# Patient Record
Sex: MALE | Race: WHITE | NOT HISPANIC OR LATINO | Employment: FULL TIME | URBAN - NONMETROPOLITAN AREA
[De-identification: names, ages, dates, MRNs, and addresses within clinical notes are randomized per-mention and may not be internally consistent; named-entity substitution may affect disease eponyms.]

---

## 2017-02-06 ENCOUNTER — OFFICE VISIT (OUTPATIENT)
Dept: CARDIOLOGY | Facility: CLINIC | Age: 47
End: 2017-02-06

## 2017-02-06 VITALS
BODY MASS INDEX: 42.66 KG/M2 | OXYGEN SATURATION: 97 % | HEART RATE: 63 BPM | DIASTOLIC BLOOD PRESSURE: 78 MMHG | HEIGHT: 72 IN | SYSTOLIC BLOOD PRESSURE: 122 MMHG | WEIGHT: 315 LBS

## 2017-02-06 DIAGNOSIS — I25.119 CORONARY ARTERY DISEASE INVOLVING NATIVE CORONARY ARTERY OF NATIVE HEART WITH ANGINA PECTORIS (HCC): Primary | ICD-10-CM

## 2017-02-06 DIAGNOSIS — R06.02 SHORTNESS OF BREATH: ICD-10-CM

## 2017-02-06 DIAGNOSIS — I20.9 ANGINA PECTORIS (HCC): ICD-10-CM

## 2017-02-06 DIAGNOSIS — E78.5 DYSLIPIDEMIA: ICD-10-CM

## 2017-02-06 DIAGNOSIS — I10 BENIGN HYPERTENSION: ICD-10-CM

## 2017-02-06 DIAGNOSIS — R07.9 CHEST PAIN, UNSPECIFIED TYPE: ICD-10-CM

## 2017-02-06 DIAGNOSIS — R42 DIZZINESS: ICD-10-CM

## 2017-02-06 DIAGNOSIS — R42 LIGHTHEADEDNESS: ICD-10-CM

## 2017-02-06 DIAGNOSIS — R00.2 PALPITATIONS: ICD-10-CM

## 2017-02-06 PROBLEM — K70.30 ALCOHOLIC CIRRHOSIS (HCC): Status: ACTIVE | Noted: 2017-02-06

## 2017-02-06 PROCEDURE — 99214 OFFICE O/P EST MOD 30 MIN: CPT | Performed by: INTERNAL MEDICINE

## 2017-02-06 RX ORDER — SERTRALINE HYDROCHLORIDE 100 MG/1
200 TABLET, FILM COATED ORAL DAILY
Refills: 2 | COMMUNITY
Start: 2016-12-27

## 2017-02-06 RX ORDER — SOY ISOFLAVONE 40 MG
500 TABLET ORAL 2 TIMES DAILY
Qty: 60 EACH | Refills: 5 | Status: SHIPPED | OUTPATIENT
Start: 2017-02-06 | End: 2017-10-05 | Stop reason: ALTCHOICE

## 2017-02-06 RX ORDER — FUROSEMIDE 20 MG/1
20 TABLET ORAL DAILY
Refills: 1 | COMMUNITY
Start: 2016-12-30 | End: 2018-01-12 | Stop reason: SDUPTHER

## 2017-02-06 RX ORDER — CLOPIDOGREL BISULFATE 75 MG/1
75 TABLET ORAL DAILY
Refills: 11 | COMMUNITY
Start: 2016-12-27 | End: 2018-01-12

## 2017-02-06 RX ORDER — NITROGLYCERIN 0.4 MG/1
0.4 TABLET SUBLINGUAL
COMMUNITY
End: 2017-02-28 | Stop reason: SDUPTHER

## 2017-02-06 RX ORDER — PANTOPRAZOLE SODIUM 40 MG/1
TABLET, DELAYED RELEASE ORAL DAILY PRN
Refills: 2 | COMMUNITY
Start: 2017-01-26 | End: 2017-09-07

## 2017-02-06 RX ORDER — ATORVASTATIN CALCIUM 40 MG/1
40 TABLET, FILM COATED ORAL DAILY
Qty: 30 TABLET | Refills: 11 | Status: SHIPPED | OUTPATIENT
Start: 2017-02-06 | End: 2017-09-07

## 2017-02-06 RX ORDER — LITHIUM CARBONATE 300 MG/1
CAPSULE ORAL 2 TIMES DAILY
Refills: 2 | COMMUNITY
Start: 2016-12-27

## 2017-02-06 RX ORDER — ASPIRIN 81 MG/1
81 TABLET ORAL DAILY
Refills: 2 | COMMUNITY
Start: 2017-01-26 | End: 2017-10-25 | Stop reason: ALTCHOICE

## 2017-02-06 RX ORDER — RANOLAZINE 500 MG/1
500 TABLET, EXTENDED RELEASE ORAL 2 TIMES DAILY
Qty: 60 TABLET | Refills: 5 | Status: SHIPPED | OUTPATIENT
Start: 2017-02-06 | End: 2017-02-07 | Stop reason: SDUPTHER

## 2017-02-07 DIAGNOSIS — I25.119 CORONARY ARTERY DISEASE INVOLVING NATIVE CORONARY ARTERY OF NATIVE HEART WITH ANGINA PECTORIS (HCC): ICD-10-CM

## 2017-02-07 DIAGNOSIS — I20.9 ANGINA PECTORIS (HCC): ICD-10-CM

## 2017-02-07 DIAGNOSIS — R07.9 CHEST PAIN, UNSPECIFIED TYPE: ICD-10-CM

## 2017-02-07 RX ORDER — RANOLAZINE 500 MG/1
500 TABLET, EXTENDED RELEASE ORAL 2 TIMES DAILY
Qty: 60 TABLET | Refills: 5 | Status: SHIPPED | OUTPATIENT
Start: 2017-02-07 | End: 2017-09-07

## 2017-02-23 ENCOUNTER — HOSPITAL ENCOUNTER (OUTPATIENT)
Dept: CARDIOLOGY | Facility: HOSPITAL | Age: 47
Discharge: HOME | End: 2017-02-23
Attending: INTERNAL MEDICINE

## 2017-02-23 ENCOUNTER — OUTSIDE FACILITY SERVICE (OUTPATIENT)
Dept: CARDIOLOGY | Facility: CLINIC | Age: 47
End: 2017-02-23

## 2017-02-23 LAB
MAXIMAL PREDICTED HEART RATE: 172 BPM
STRESS TARGET HR: 146 BPM

## 2017-02-23 PROCEDURE — A9500 TC99M SESTAMIBI: HCPCS | Performed by: INTERNAL MEDICINE

## 2017-02-23 PROCEDURE — 93306 TTE W/DOPPLER COMPLETE: CPT

## 2017-02-23 PROCEDURE — 78452 HT MUSCLE IMAGE SPECT MULT: CPT

## 2017-02-23 PROCEDURE — 78452 HT MUSCLE IMAGE SPECT MULT: CPT | Performed by: INTERNAL MEDICINE

## 2017-02-23 PROCEDURE — 25010000002 DOBUTAMINE PER 250 MG: Performed by: INTERNAL MEDICINE

## 2017-02-23 PROCEDURE — 0 TECHNETIUM SESTAMIBI: Performed by: INTERNAL MEDICINE

## 2017-02-23 PROCEDURE — 93018 CV STRESS TEST I&R ONLY: CPT | Performed by: INTERNAL MEDICINE

## 2017-02-23 PROCEDURE — 93306 TTE W/DOPPLER COMPLETE: CPT | Performed by: INTERNAL MEDICINE

## 2017-02-23 PROCEDURE — 93017 CV STRESS TEST TRACING ONLY: CPT

## 2017-02-23 RX ORDER — METOPROLOL TARTRATE 5 MG/5ML
2.5 INJECTION INTRAVENOUS ONCE
Status: COMPLETED | OUTPATIENT
Start: 2017-02-23 | End: 2017-02-23

## 2017-02-23 RX ORDER — DOBUTAMINE HYDROCHLORIDE 200 MG/100ML
10 INJECTION INTRAVENOUS CONTINUOUS
Status: DISCONTINUED | OUTPATIENT
Start: 2017-02-23 | End: 2017-02-24 | Stop reason: HOSPADM

## 2017-02-23 RX ADMIN — Medication 1 DOSE: at 13:00

## 2017-02-23 RX ADMIN — METOPROLOL TARTRATE 2.5 MG: 1 INJECTION, SOLUTION INTRAVENOUS at 14:26

## 2017-02-23 RX ADMIN — DOBUTAMINE HYDROCHLORIDE 10 MCG/KG/MIN: 200 INJECTION INTRAVENOUS at 14:26

## 2017-02-23 RX ADMIN — Medication 1 DOSE: at 14:26

## 2017-02-27 ENCOUNTER — TELEPHONE (OUTPATIENT)
Dept: CARDIOLOGY | Facility: CLINIC | Age: 47
End: 2017-02-27

## 2017-02-28 ENCOUNTER — OFFICE VISIT (OUTPATIENT)
Dept: CARDIOLOGY | Facility: CLINIC | Age: 47
End: 2017-02-28

## 2017-02-28 VITALS
DIASTOLIC BLOOD PRESSURE: 82 MMHG | WEIGHT: 315 LBS | BODY MASS INDEX: 42.66 KG/M2 | OXYGEN SATURATION: 98 % | SYSTOLIC BLOOD PRESSURE: 133 MMHG | HEIGHT: 72 IN | HEART RATE: 84 BPM

## 2017-02-28 DIAGNOSIS — R94.39 ABNORMAL STRESS TEST: ICD-10-CM

## 2017-02-28 DIAGNOSIS — R06.02 SHORTNESS OF BREATH: ICD-10-CM

## 2017-02-28 DIAGNOSIS — I25.119 CORONARY ARTERY DISEASE INVOLVING NATIVE CORONARY ARTERY OF NATIVE HEART WITH ANGINA PECTORIS (HCC): ICD-10-CM

## 2017-02-28 DIAGNOSIS — R07.2 PRECORDIAL PAIN: Primary | ICD-10-CM

## 2017-02-28 PROCEDURE — 99213 OFFICE O/P EST LOW 20 MIN: CPT | Performed by: PHYSICIAN ASSISTANT

## 2017-02-28 RX ORDER — INSULIN GLARGINE 100 [IU]/ML
50 INJECTION, SOLUTION SUBCUTANEOUS
Refills: 1 | COMMUNITY
Start: 2017-02-16 | End: 2019-08-20 | Stop reason: SDUPTHER

## 2017-02-28 RX ORDER — NITROGLYCERIN 0.4 MG/1
0.4 TABLET SUBLINGUAL
Qty: 25 TABLET | Refills: 2 | Status: SHIPPED | OUTPATIENT
Start: 2017-02-28 | End: 2017-06-17 | Stop reason: SDUPTHER

## 2017-02-28 NOTE — PROGRESS NOTES
Problem list     Subjective   Ricardo Crespo is a 46 y.o. male     Chief Complaint   Patient presents with   • Follow-up     testing f/u   Problem List:  1.  Coronary artery disease  1.1.  Stenting of the LAD, 11/2015, per Dr. Moss.  1.2.  Repeat catheterization 2-3 weeks after stenting as above, indicating patent stent with 40% PDA stenosis.  Medical management was recommended.  1.3.  Repeat catheterization, 01/2016, indicating nonobstructive disease with patent stent.  Medical management was recommended.  2.  Preserved systolic function  3.  Hypertension  4.  Dyslipidemia  5.  Diabetes mellitus  6.  Sleep apnea  7.  Reported history of DVT.    HPI  The patient presents back today to review test results.  He was scheduled for stress test because of chest and jaw pain.  The patient had that test approximately a week ago.  During dobutamine infusion, the patient developed significant jaw pain with nondiagnostic EKG changes.  Scintigraphically however, the patient had inferobasilar, inferobasal/septal, and basilar posterolateral ischemia.  Post stress EF was preserved, however the patient had an elevated TID suggesting possible ischemic LV dilation post stress.  It was felt to be of increased risk given symptoms expressed during study and the ischemic burden as above.  We wanted to bring him back today to discuss results.  The patient continues to have chest and especially jaw pain.  His jaw pain is most concerning for him as this is what he had specifically prior to stenting.  He reports this as his anginal equivalent symptom.  Symptoms appear to be worsening.  He reports that symptoms occur with exertion.  His symptoms were so severe recently that he had had a syncopal episode related to the same.  He reports he had no rhythm disturbance symptoms at that time.  The patient has no failure symptoms otherwise.  He has no further complaints otherwise.    Current Outpatient Prescriptions   Medication Sig Dispense  Refill   • ASPIR-LOW 81 MG EC tablet Daily.  2   • atorvastatin (LIPITOR) 40 MG tablet Take 1 tablet by mouth Daily. 30 tablet 11   • clopidogrel (PLAVIX) 75 MG tablet Daily.  11   • furosemide (LASIX) 20 MG tablet Daily.  1   • L-Arginine 500 MG capsule Take 500 mg by mouth 2 (Two) Times a Day. 60 each 5   • lithium carbonate 300 MG capsule 5 x daily  2   • pantoprazole (PROTONIX) 40 MG EC tablet Daily As Needed.  2   • ranolazine (RANEXA) 500 MG 12 hr tablet Take 1 tablet by mouth 2 (Two) Times a Day. 60 tablet 5   • sertraline (ZOLOFT) 100 MG tablet 200 mg Daily.  2   • Insulin Glargine (BASAGLAR KWIKPEN) 100 UNIT/ML injection pen 32 units daily  1   • nitroglycerin (NITROSTAT) 0.4 MG SL tablet Place 1 tablet under the tongue Every 5 (Five) Minutes As Needed for chest pain. Take no more than 3 doses in 15 minutes. 25 tablet 2     No current facility-administered medications for this visit.        Beta adrenergic blockers; Calcium channel blockers; and Imdur [isosorbide dinitrate]    Past Medical History   Diagnosis Date   • Anxiety    • Benign hypertension    • Coronary artery disease    • Depression    • DVT (deep venous thrombosis)      Questionable history of deep venous thrombosis.   • Dyslipidemia    • Hyperlipidemia    • JIM on CPAP      Obstructive sleep apnea, compliant with CPAP.    • Type 2 diabetes mellitus        Social History     Social History   • Marital status:      Spouse name: N/A   • Number of children: N/A   • Years of education: N/A     Occupational History   • Not on file.     Social History Main Topics   • Smoking status: Former Smoker   • Smokeless tobacco: Not on file   • Alcohol use No   • Drug use: No   • Sexual activity: Not on file     Other Topics Concern   • Not on file     Social History Narrative       Family History   Problem Relation Age of Onset   • Heart disease Father    • Nephrolithiasis Father    • Atrial fibrillation Mother    • No Known Problems Sister   "      Review of Systems   Constitutional: Positive for fatigue.   HENT: Negative.    Eyes: Positive for visual disturbance (glasses).   Respiratory: Positive for shortness of breath.    Cardiovascular: Positive for chest pain, palpitations and leg swelling (rt.).   Gastrointestinal: Negative.    Endocrine: Negative.    Genitourinary: Negative.    Musculoskeletal: Negative.    Skin: Negative.    Allergic/Immunologic: Negative.    Neurological: Positive for dizziness and light-headedness.   Hematological: Bruises/bleeds easily.   Psychiatric/Behavioral: Positive for sleep disturbance.       Objective   Visit Vitals   • /82 (BP Location: Left arm, Patient Position: Sitting)   • Pulse 84   • Ht 72\" (182.9 cm)   • Wt (!) 322 lb (146 kg)   • SpO2 98%   • BMI 43.67 kg/m2     Lab Results (most recent)     None        Physical Exam   Constitutional: He is oriented to person, place, and time. He appears well-developed and well-nourished. No distress.   HENT:   Head: Normocephalic and atraumatic.   Eyes: Conjunctivae and EOM are normal. Pupils are equal, round, and reactive to light.   Neck: Normal range of motion. Neck supple. No JVD present. No tracheal deviation present.   Cardiovascular: Normal rate, regular rhythm, normal heart sounds and intact distal pulses.    Pulmonary/Chest: Effort normal and breath sounds normal.   Abdominal: Soft. Bowel sounds are normal. He exhibits no distension and no mass. There is no tenderness. There is no rebound and no guarding.   Musculoskeletal: Normal range of motion. He exhibits no edema, tenderness or deformity.   Neurological: He is alert and oriented to person, place, and time.   Skin: Skin is warm and dry. No rash noted. No erythema. No pallor.   Psychiatric: He has a normal mood and affect. His behavior is normal. Judgment and thought content normal.   Nursing note and vitals reviewed.        Procedure   Procedures       Assessment/Plan      Diagnosis Plan   1. Precordial " pain  Cardiac catheterization    CBC & Differential    Comprehensive Metabolic Panel    CBC & Differential    Comprehensive Metabolic Panel   2. Coronary artery disease involving native coronary artery of native heart with angina pectoris  Cardiac catheterization    CBC & Differential    Comprehensive Metabolic Panel    CBC & Differential    Comprehensive Metabolic Panel   3. Shortness of breath  Cardiac catheterization    CBC & Differential    Comprehensive Metabolic Panel    CBC & Differential    Comprehensive Metabolic Panel   4. Abnormal stress test         With the patient's abnormal stress test in severity of symptoms, I do feel that he needs catheterization.  I'll continue medications.  He is on appropriate medications for now.  Further recommendations to be made pending results of catheterization.  He will need labs prior to catheter as we do not have recent values for him.  We'll see him back after catheter and recommend further at that time.

## 2017-03-13 ENCOUNTER — TELEPHONE (OUTPATIENT)
Dept: CARDIOLOGY | Facility: CLINIC | Age: 47
End: 2017-03-13

## 2017-03-13 NOTE — TELEPHONE ENCOUNTER
----- Message from Kelle Chambers sent at 3/13/2017 12:10 PM EDT -----  Contact: PATIENT  CALLED AND ASKED FOR KELLIE. HAS SOME QUESTIONS ABOUT HIS HEART CATH TOMORROW. [phone]    Reviewed instructions and medications with patient.

## 2017-03-14 ENCOUNTER — OUTSIDE FACILITY SERVICE (OUTPATIENT)
Dept: CARDIOLOGY | Facility: CLINIC | Age: 47
End: 2017-03-14

## 2017-03-14 PROCEDURE — 93458 L HRT ARTERY/VENTRICLE ANGIO: CPT | Performed by: INTERNAL MEDICINE

## 2017-03-14 PROCEDURE — 92978 ENDOLUMINL IVUS OCT C 1ST: CPT | Performed by: INTERNAL MEDICINE

## 2017-03-14 PROCEDURE — 92928 PRQ TCAT PLMT NTRAC ST 1 LES: CPT | Performed by: INTERNAL MEDICINE

## 2017-04-03 ENCOUNTER — OFFICE VISIT (OUTPATIENT)
Dept: CARDIOLOGY | Facility: CLINIC | Age: 47
End: 2017-04-03

## 2017-04-03 VITALS
SYSTOLIC BLOOD PRESSURE: 119 MMHG | WEIGHT: 315 LBS | OXYGEN SATURATION: 98 % | HEIGHT: 72 IN | HEART RATE: 75 BPM | BODY MASS INDEX: 42.66 KG/M2 | DIASTOLIC BLOOD PRESSURE: 80 MMHG

## 2017-04-03 DIAGNOSIS — R06.02 SHORTNESS OF BREATH: ICD-10-CM

## 2017-04-03 DIAGNOSIS — R07.2 PRECORDIAL PAIN: ICD-10-CM

## 2017-04-03 DIAGNOSIS — I25.10 CORONARY ARTERY DISEASE INVOLVING NATIVE CORONARY ARTERY OF NATIVE HEART WITHOUT ANGINA PECTORIS: Primary | ICD-10-CM

## 2017-04-03 PROCEDURE — 99213 OFFICE O/P EST LOW 20 MIN: CPT | Performed by: PHYSICIAN ASSISTANT

## 2017-04-03 NOTE — PROGRESS NOTES
Problem list     Subjective   Ricardo Crespo is a 46 y.o. male     Chief Complaint   Patient presents with   • Follow-up     presents as a follow up   Problem List:  1. Coronary artery disease  1.1. Stenting of the LAD, 11/2015, per Dr. Moss.  1.2. Repeat catheterization 2-3 weeks after stenting as above, indicating patent stent with 40% PDA stenosis. Medical management was recommended.  1.3. Repeat catheterization, 01/2016, indicating nonobstructive disease with patent stent. Medical management was recommended.  1.4.  Stenting of the PDA, 03/2017, in the setting of low-level symptoms and abnormal stress test.  Previous stent to the LAD was patent.  The patient had nonobstructive disease otherwise.  Medical management was recommended.  2. Preserved systolic function  3. Hypertension  4. Dyslipidemia  5. Diabetes mellitus  6. Sleep apnea  7. Reported history of DVT.       HPI  The patient presents back today for follow-up post catheterization.  He was scheduled for catheterization given chest and jaw pain and ischemia noted by stress test.  He did have stenting of the PDA for 75-80% stenosis.  The patient had a patent stent to the LAD.  He had nonobstructive disease otherwise.  Medical management was recommended.  The patient feels much improved post stenting.  He noted almost immediately an increase in exercise capacity and tolerance.  He reports that his dyspnea has markedly improved.  He has had no further jaw pain.  Only rarely does he have chest pain, which has been a chronic issue for him.  The patient has no PND or orthopnea.  He relates to no rhythm disturbance issues.  Cast site has remained stable post diagnostic catheter with intervention.  He has no further complaints otherwise and feels that he is doing very well.    Current Outpatient Prescriptions   Medication Sig Dispense Refill   • ASPIR-LOW 81 MG EC tablet Daily.  2   • atorvastatin (LIPITOR) 40 MG tablet Take 1 tablet by mouth Daily. 30 tablet  11   • clopidogrel (PLAVIX) 75 MG tablet Daily.  11   • furosemide (LASIX) 20 MG tablet Daily.  1   • Insulin Glargine (BASAGLAR KWIKPEN) 100 UNIT/ML injection pen 32 units daily  1   • L-Arginine 500 MG capsule Take 500 mg by mouth 2 (Two) Times a Day. 60 each 5   • lithium carbonate 300 MG capsule 5 x daily  2   • nitroglycerin (NITROSTAT) 0.4 MG SL tablet Place 1 tablet under the tongue Every 5 (Five) Minutes As Needed for chest pain. Take no more than 3 doses in 15 minutes. 25 tablet 2   • pantoprazole (PROTONIX) 40 MG EC tablet Daily As Needed.  2   • ranolazine (RANEXA) 500 MG 12 hr tablet Take 1 tablet by mouth 2 (Two) Times a Day. 60 tablet 5   • sertraline (ZOLOFT) 100 MG tablet 200 mg Daily.  2     No current facility-administered medications for this visit.        Beta adrenergic blockers; Calcium channel blockers; and Imdur [isosorbide dinitrate]    Past Medical History:   Diagnosis Date   • Anxiety    • Benign hypertension    • Coronary artery disease    • Depression    • DVT (deep venous thrombosis)     Questionable history of deep venous thrombosis.   • Dyslipidemia    • Hyperlipidemia    • JIM on CPAP     Obstructive sleep apnea, compliant with CPAP.    • Type 2 diabetes mellitus        Social History     Social History   • Marital status:      Spouse name: N/A   • Number of children: N/A   • Years of education: N/A     Occupational History   • Not on file.     Social History Main Topics   • Smoking status: Former Smoker   • Smokeless tobacco: Not on file   • Alcohol use No   • Drug use: No   • Sexual activity: Not on file     Other Topics Concern   • Not on file     Social History Narrative       Family History   Problem Relation Age of Onset   • Heart disease Father    • Nephrolithiasis Father    • Atrial fibrillation Mother    • No Known Problems Sister        Review of Systems   Constitutional: Positive for fatigue.   HENT: Negative for rhinorrhea.    Eyes: Positive for visual disturbance.  "  Respiratory: Positive for shortness of breath.    Cardiovascular: Positive for chest pain and leg swelling. Negative for palpitations.   Gastrointestinal: Negative.    Endocrine: Negative.    Genitourinary: Negative.    Musculoskeletal: Positive for neck pain.   Skin: Negative.    Allergic/Immunologic: Negative.    Neurological: Positive for headaches.   Hematological: Bruises/bleeds easily.   Psychiatric/Behavioral: The patient is nervous/anxious.        Objective   /80 (BP Location: Left arm, Patient Position: Sitting)  Pulse 75  Ht 72\" (182.9 cm)  Wt (!) 322 lb 6.4 oz (146 kg)  SpO2 98%  BMI 43.73 kg/m2  Lab Results (most recent)     None        Physical Exam   Constitutional: He is oriented to person, place, and time. He appears well-developed and well-nourished. No distress.   HENT:   Head: Normocephalic and atraumatic.   Eyes: Conjunctivae and EOM are normal. Pupils are equal, round, and reactive to light.   Neck: Normal range of motion. Neck supple. No JVD present. No tracheal deviation present.   Cardiovascular: Normal rate, regular rhythm, normal heart sounds and intact distal pulses.    Pulmonary/Chest: Effort normal and breath sounds normal.   Abdominal: Soft. Bowel sounds are normal. He exhibits no distension and no mass. There is no tenderness. There is no rebound and no guarding.   Musculoskeletal: Normal range of motion. He exhibits no edema, tenderness or deformity.   Neurological: He is alert and oriented to person, place, and time.   Skin: Skin is warm and dry. No rash noted. No erythema. No pallor.   Psychiatric: He has a normal mood and affect. His behavior is normal. Judgment and thought content normal.   Nursing note and vitals reviewed.        Procedure   Procedures       Assessment/Plan      Diagnosis Plan   1. Coronary artery disease involving native coronary artery of native heart without angina pectoris     2. Shortness of breath     3. Precordial pain       Symptoms, as " above, have improved post stenting of the PDA.  The patient reports that he feels very well at this time.  He is on appropriate medications for which I will make no changes.  The patient is doing well post-stenting.  For complications, he will call to us.  Otherwise, nothing further and we will see him back in 6 months.         Answers for HPI/ROS submitted by the patient on 3/31/2017   Shortness of breath  Chronicity: new  Onset: more than 1 month ago  Frequency: daily  Progression since onset: gradually worsening  Episode duration: 101 minutes  leg pain: Yes  orthopnea: Yes  Aggravating factors: occupational exposure, lying flat

## 2017-04-26 ENCOUNTER — TELEPHONE (OUTPATIENT)
Dept: CARDIOLOGY | Facility: CLINIC | Age: 47
End: 2017-04-26

## 2017-04-26 NOTE — TELEPHONE ENCOUNTER
Called and spoke with wife and she states he has been having severe chest pain and has had to take up to 10 SL NTG., but he refuses to go to the ER. I told her that if he takes up to 3 he has to go to the ER. She stated she has told him this. She requested an appt. Appt. Scheduled jacki. With DEMETRIUS Terrell at 3:00 pm. Wife aware and i told her that in the meantime he has to go to the ER. Verbalized she understood. PH,LPN

## 2017-04-26 NOTE — TELEPHONE ENCOUNTER
----- Message from Kelle Chambers sent at 4/26/2017 10:04 AM EDT -----  Contact: PT  PATIENT IS WANTING A NURSE TO CALL HIM BACK. HE WOULDN'T GIVE ME ANY DETAILS AS TO WHY HE WANTED TO SPEAK TO SOMEONE. PLEASE ADVISE. KG

## 2017-06-17 DIAGNOSIS — R07.89 OTHER CHEST PAIN: Primary | ICD-10-CM

## 2017-06-19 RX ORDER — NITROGLYCERIN 0.4 MG/1
TABLET SUBLINGUAL
Qty: 25 TABLET | Refills: 2 | Status: SHIPPED | OUTPATIENT
Start: 2017-06-19 | End: 2017-12-21 | Stop reason: SDUPTHER

## 2017-09-07 ENCOUNTER — OFFICE VISIT (OUTPATIENT)
Dept: CARDIOLOGY | Facility: CLINIC | Age: 47
End: 2017-09-07

## 2017-09-07 VITALS
SYSTOLIC BLOOD PRESSURE: 143 MMHG | WEIGHT: 315 LBS | OXYGEN SATURATION: 98 % | BODY MASS INDEX: 42.66 KG/M2 | HEART RATE: 74 BPM | HEIGHT: 72 IN | DIASTOLIC BLOOD PRESSURE: 91 MMHG

## 2017-09-07 DIAGNOSIS — R07.2 PRECORDIAL PAIN: Primary | ICD-10-CM

## 2017-09-07 DIAGNOSIS — R06.02 SHORTNESS OF BREATH: ICD-10-CM

## 2017-09-07 DIAGNOSIS — I10 ESSENTIAL HYPERTENSION: ICD-10-CM

## 2017-09-07 DIAGNOSIS — R53.83 FATIGUE, UNSPECIFIED TYPE: ICD-10-CM

## 2017-09-07 PROCEDURE — 93000 ELECTROCARDIOGRAM COMPLETE: CPT | Performed by: PHYSICIAN ASSISTANT

## 2017-09-07 PROCEDURE — 99214 OFFICE O/P EST MOD 30 MIN: CPT | Performed by: PHYSICIAN ASSISTANT

## 2017-09-07 RX ORDER — ROSUVASTATIN CALCIUM 10 MG/1
10 TABLET, COATED ORAL DAILY
Qty: 30 TABLET | Refills: 11 | Status: SHIPPED | OUTPATIENT
Start: 2017-09-07 | End: 2017-10-25 | Stop reason: ALTCHOICE

## 2017-09-07 NOTE — PROGRESS NOTES
Problem list     Subjective   Ricardo Crespo is a 47 y.o. male     Chief Complaint   Patient presents with   • Chest Pain   • Jaw Pain   • Fatigue   • Shortness of Breath     Problem List:  1. Coronary artery disease  1.1. Stenting of the LAD, 11/2015, per Dr. Moss.  1.2. Repeat catheterization 2-3 weeks after stenting as above, indicating patent stent with 40% PDA stenosis. Medical management was recommended.  1.3. Repeat catheterization, 01/2016, indicating nonobstructive disease with patent stent. Medical management was recommended.  1.4.  Stenting of the PDA, 03/2017, in the setting of low-level symptoms and abnormal stress test.  Previous stent to the LAD was patent.  The patient had nonobstructive disease otherwise.  Medical management was recommended.  2. Preserved systolic function  3. Hypertension  4. Dyslipidemia  5. Diabetes mellitus  6. Sleep apnea  7. Reported history of DVT.    HPI  The patient presents back today at his request.  He has started having episodes of chest tightness with referral to the left neck, left jaw, and left upper extremity.  These are the exact symptoms he had with her previous episodes of acute coronary syndrome.  He is concerned enough at the symptoms, particularly with the progression of his symptoms, that he is requested evaluation.  Symptoms occur with exertion.  Symptoms are relieved with nitroglycerin historically.  He reports no rhythm disturbance or failure issues/symptoms at this time.  Blood pressures have been reasonably well-controlled.  He denies further complaints or symptoms otherwise.    Current Outpatient Prescriptions   Medication Sig Dispense Refill   • ASPIR-LOW 81 MG EC tablet Daily.  2   • clopidogrel (PLAVIX) 75 MG tablet Daily.  11   • furosemide (LASIX) 20 MG tablet Daily.  1   • Insulin Glargine (BASAGLAR KWIKPEN) 100 UNIT/ML injection pen Inject 140 Units under the skin Daily. 32 units daily  1   • L-Arginine 500 MG capsule Take 500 mg by mouth 2  (Two) Times a Day. 60 each 5   • lithium carbonate 300 MG capsule 5 x daily  2   • nitroglycerin (NITROSTAT) 0.4 MG SL tablet PLACE 1 TABLET UNDER THE TONGUE EVERY 5 MINUTES UP TO 3 TABLETS IN 15 MINUTES FOR CHEST PAIN. IF NO RELIEF GO TO THE EMERGENCY ROOM OR CALL 25 tablet 2   • sertraline (ZOLOFT) 100 MG tablet 200 mg Daily.  2   • rosuvastatin (CRESTOR) 10 MG tablet Take 1 tablet by mouth Daily. 30 tablet 11     No current facility-administered medications for this visit.        Beta adrenergic blockers; Calcium channel blockers; and Imdur [isosorbide dinitrate]    Past Medical History:   Diagnosis Date   • Anxiety    • Benign hypertension    • Coronary artery disease    • Depression    • DVT (deep venous thrombosis)     Questionable history of deep venous thrombosis.   • Dyslipidemia    • Hyperlipidemia    • JIM on CPAP     Obstructive sleep apnea, compliant with CPAP.    • Type 2 diabetes mellitus        Social History     Social History   • Marital status:      Spouse name: N/A   • Number of children: N/A   • Years of education: N/A     Occupational History   • Not on file.     Social History Main Topics   • Smoking status: Former Smoker   • Smokeless tobacco: Not on file   • Alcohol use No   • Drug use: No   • Sexual activity: Not on file     Other Topics Concern   • Not on file     Social History Narrative       Family History   Problem Relation Age of Onset   • Heart disease Father    • Nephrolithiasis Father    • Atrial fibrillation Mother    • No Known Problems Sister        Review of Systems   Constitutional: Positive for fatigue.   HENT: Positive for tinnitus (left ear).    Eyes: Positive for visual disturbance (glasses).   Respiratory: Positive for shortness of breath.    Cardiovascular: Positive for chest pain, palpitations and leg swelling (rt.).   Gastrointestinal: Negative.    Endocrine: Negative.    Genitourinary: Negative.    Musculoskeletal: Negative.    Skin: Negative.   "  Allergic/Immunologic: Negative.    Neurological: Positive for dizziness and light-headedness.   Hematological: Bruises/bleeds easily (bruise).   Psychiatric/Behavioral: Negative.        Objective   /91 (BP Location: Left arm, Patient Position: Sitting)  Pulse 74  Ht 72\" (182.9 cm)  Wt (!) 319 lb 9.6 oz (145 kg)  SpO2 98%  BMI 43.35 kg/m2  Lab Results (most recent)     None        Physical Exam   Constitutional: He is oriented to person, place, and time. He appears well-developed and well-nourished. No distress.   HENT:   Head: Normocephalic and atraumatic.   Eyes: Conjunctivae and EOM are normal. Pupils are equal, round, and reactive to light.   Neck: Normal range of motion. Neck supple. No JVD present. No tracheal deviation present.   Cardiovascular: Normal rate, regular rhythm, normal heart sounds and intact distal pulses.    Pulmonary/Chest: Effort normal and breath sounds normal.   Abdominal: Soft. Bowel sounds are normal. He exhibits no distension and no mass. There is no tenderness. There is no rebound and no guarding.   Musculoskeletal: Normal range of motion. He exhibits no edema, tenderness or deformity.   Neurological: He is alert and oriented to person, place, and time.   Skin: Skin is warm and dry. No rash noted. No erythema. No pallor.   Psychiatric: He has a normal mood and affect. His behavior is normal. Judgment and thought content normal.   Nursing note and vitals reviewed.        Procedure     ECG 12 Lead  Date/Time: 9/7/2017 3:21 PM  Performed by: RENETTA ESTEVEZ  Authorized by: RENETTA ESTEVEZ   Comments: Sinus rhythm, normal axis, incomplete right bundle branch block pattern, no acute changes noted.               Assessment/Plan      Diagnosis Plan   1. Precordial pain  ECG 12 Lead    Cardiac catheterization    Comprehensive Metabolic Panel    CBC & Differential    Comprehensive Metabolic Panel    CBC & Differential   2. Shortness of breath  ECG 12 Lead    Cardiac catheterization "    Comprehensive Metabolic Panel    CBC & Differential    Comprehensive Metabolic Panel    CBC & Differential   3. Fatigue, unspecified type  ECG 12 Lead    Cardiac catheterization    Comprehensive Metabolic Panel    CBC & Differential    Comprehensive Metabolic Panel    CBC & Differential   4. Essential hypertension       The patient describes, and has what he feels is, unstable angina.  We discussed various options of evaluation of his current symptoms.  I discussed consideration for stress test.  His concern is that his current symptoms are exactly what he had with unstable angina/acute coronary syndrome in the past, resulting in percutaneous intervention.  His stress test results prior to that were unremarkable at that time.  He is concerned and convinced enough with symptoms that after discussing workup with him, we have decided to proceed on with catheterization.  I will continue dual antiplatelet therapy and statin therapy.  He has nitroglycerin use as needed.  We would like to repeat labs prior to catheterization.  Should he have symptoms prior to catheter, he will proceed on to the emergency room.  Further pending results of catheterization.

## 2017-09-12 LAB
ALBUMIN SERPL-MCNC: 4.7 G/DL (ref 3.5–5.5)
ALBUMIN/GLOB SERPL: 1.5 {RATIO} (ref 1.2–2.2)
ALP SERPL-CCNC: 85 IU/L (ref 39–117)
ALT SERPL-CCNC: 36 IU/L (ref 0–44)
AMBIG ABBREV CMP14 DEFAULT: NORMAL
AST SERPL-CCNC: 37 IU/L (ref 0–40)
BILIRUB SERPL-MCNC: 0.5 MG/DL (ref 0–1.2)
BUN SERPL-MCNC: 11 MG/DL (ref 6–24)
BUN/CREAT SERPL: 14 (ref 9–20)
CALCIUM SERPL-MCNC: 9 MG/DL (ref 8.7–10.2)
CHLORIDE SERPL-SCNC: 103 MMOL/L (ref 96–106)
CO2 SERPL-SCNC: 22 MMOL/L (ref 18–29)
CREAT SERPL-MCNC: 0.81 MG/DL (ref 0.76–1.27)
GLOBULIN SER CALC-MCNC: 3.1 G/DL (ref 1.5–4.5)
GLUCOSE SERPL-MCNC: 125 MG/DL (ref 65–99)
POTASSIUM SERPL-SCNC: 4.2 MMOL/L (ref 3.5–5.2)
PROT SERPL-MCNC: 7.8 G/DL (ref 6–8.5)
SODIUM SERPL-SCNC: 143 MMOL/L (ref 134–144)

## 2017-09-19 ENCOUNTER — TELEPHONE (OUTPATIENT)
Dept: CARDIOLOGY | Facility: CLINIC | Age: 47
End: 2017-09-19

## 2017-09-19 NOTE — TELEPHONE ENCOUNTER
patient insurance approved Crestor 10 mg. Valid dates 09-18-17 - 09-18-18. patient pharmacy has been notified.

## 2017-09-26 ENCOUNTER — TELEPHONE (OUTPATIENT)
Dept: CARDIOLOGY | Facility: CLINIC | Age: 47
End: 2017-09-26

## 2017-09-26 NOTE — TELEPHONE ENCOUNTER
----- Message from Jeison Bull sent at 9/25/2017  3:21 PM EDT -----  Contact: [facility]  JENNIFER  I CALLED JENNIFER AT THE  AT [facility] AND SHE INFORMED ME THAT BECCA HERRON DID NOT CHECK IN FOR HIS HEART CATH SCHEDULED FOR TODAY  (9/25/17)     Patient presented to ER on 9/18/17 and underwent Cardiac Cath with Stent Placement per Dr. Bee. Will follow up in office.

## 2017-10-05 ENCOUNTER — OFFICE VISIT (OUTPATIENT)
Dept: CARDIOLOGY | Facility: CLINIC | Age: 47
End: 2017-10-05

## 2017-10-05 VITALS
OXYGEN SATURATION: 96 % | WEIGHT: 315 LBS | HEART RATE: 80 BPM | HEIGHT: 72 IN | SYSTOLIC BLOOD PRESSURE: 139 MMHG | BODY MASS INDEX: 42.66 KG/M2 | DIASTOLIC BLOOD PRESSURE: 88 MMHG

## 2017-10-05 DIAGNOSIS — I25.10 CORONARY ARTERY DISEASE INVOLVING NATIVE CORONARY ARTERY OF NATIVE HEART WITHOUT ANGINA PECTORIS: ICD-10-CM

## 2017-10-05 DIAGNOSIS — R00.2 PALPITATIONS: ICD-10-CM

## 2017-10-05 DIAGNOSIS — R07.2 PRECORDIAL PAIN: Primary | ICD-10-CM

## 2017-10-05 PROCEDURE — 99213 OFFICE O/P EST LOW 20 MIN: CPT | Performed by: PHYSICIAN ASSISTANT

## 2017-10-05 PROCEDURE — 93000 ELECTROCARDIOGRAM COMPLETE: CPT | Performed by: PHYSICIAN ASSISTANT

## 2017-10-05 RX ORDER — APIXABAN 5 MG/1
5 TABLET, FILM COATED ORAL DAILY
COMMUNITY
Start: 2017-09-20 | End: 2018-01-02 | Stop reason: SDUPTHER

## 2017-10-05 NOTE — PROGRESS NOTES
Problem list     Subjective   Ricardo Crespo is a 47 y.o. male     Chief Complaint   Patient presents with   • Chest Pain     presents for a follow up from cath, having some chest pain and shortness o fbreath   • Shortness of Breath   Problem List:  1. Coronary artery disease  1.1. Stenting of the LAD, 11/2015, per Dr. Moss.  1.2. Repeat catheterization 2-3 weeks after stenting as above, indicating patent stent with 40% PDA stenosis. Medical management was recommended.  1.3. Repeat catheterization, 01/2016, indicating nonobstructive disease with patent stent. Medical management was recommended.  1.4.  Stenting of the PDA, 03/2017, in the setting of low-level symptoms and abnormal stress test.  Previous stent to the LAD was patent.  The patient had nonobstructive disease otherwise.  Medical management was recommended.  1.5.  Stenting of the mid RCA and PTCA only of the distal LAD, 09/17.  2. Preserved systolic function  3. Hypertension  4. Dyslipidemia  5. Diabetes mellitus  6. Sleep apnea  7. Reported history of DVT.    HPI  The patient presents back for follow-up post catheterization.  I had seen him in the office and scheduled for outpatient catheterization.  He went to the ER just a couple of days later however with crescendo angina.  He was taken for catheterization had stenting of the RCA and PTCA only of the distal LAD as above.  The patient does feel improved.  His episodes of chest pain is been minimal.  He does note that he was out working just a couple of days ago.  He reports that he exert at a very high levels.  He had onset of chest discomfort at that time.  He feels this was an episode of atrial fibrillation however.  He reports that he can feel his heart out of rhythm, which he reports has been A. fib in the past.  He then noted mild chest discomfort.  The patient has less dyspnea at this time.  He has no failure symptoms.  He has no further complaints otherwise.    Current Outpatient  Prescriptions   Medication Sig Dispense Refill   • ASPIR-LOW 81 MG EC tablet Take 81 mg by mouth Daily.  2   • clopidogrel (PLAVIX) 75 MG tablet Take 75 mg by mouth Daily.  11   • ELIQUIS 5 MG tablet tablet Take 5 mg by mouth Daily.     • furosemide (LASIX) 20 MG tablet Take 20 mg by mouth Daily.  1   • Insulin Glargine (BASAGLAR KWIKPEN) 100 UNIT/ML injection pen Inject 140 Units under the skin Daily. 32 units daily  1   • lithium carbonate 300 MG capsule 5 x daily  2   • nitroglycerin (NITROSTAT) 0.4 MG SL tablet PLACE 1 TABLET UNDER THE TONGUE EVERY 5 MINUTES UP TO 3 TABLETS IN 15 MINUTES FOR CHEST PAIN. IF NO RELIEF GO TO THE EMERGENCY ROOM OR CALL 25 tablet 2   • rosuvastatin (CRESTOR) 10 MG tablet Take 1 tablet by mouth Daily. 30 tablet 11   • sertraline (ZOLOFT) 100 MG tablet 200 mg Daily.  2     No current facility-administered medications for this visit.        Beta adrenergic blockers; Calcium channel blockers; and Imdur [isosorbide dinitrate]    Past Medical History:   Diagnosis Date   • Anxiety    • Benign hypertension    • Coronary artery disease    • Depression    • DVT (deep venous thrombosis)     Questionable history of deep venous thrombosis.   • Dyslipidemia    • Hyperlipidemia    • JIM on CPAP     Obstructive sleep apnea, compliant with CPAP.    • Type 2 diabetes mellitus        Social History     Social History   • Marital status:      Spouse name: N/A   • Number of children: N/A   • Years of education: N/A     Occupational History   • Not on file.     Social History Main Topics   • Smoking status: Former Smoker   • Smokeless tobacco: Never Used   • Alcohol use No   • Drug use: No   • Sexual activity: Not on file     Other Topics Concern   • Not on file     Social History Narrative       Family History   Problem Relation Age of Onset   • Heart disease Father    • Nephrolithiasis Father    • Atrial fibrillation Mother    • No Known Problems Sister        Review of Systems   Constitutional:  "Negative.  Negative for fever.   HENT: Negative for ear pain and sore throat.    Eyes: Negative.    Respiratory: Positive for shortness of breath and wheezing.    Cardiovascular: Positive for chest pain (patient states he was feeling bad last night, wife said he even turned gray) and leg swelling.   Gastrointestinal: Negative.  Negative for abdominal pain and vomiting.   Endocrine: Negative.    Genitourinary: Negative.    Musculoskeletal: Negative.  Negative for neck pain.   Skin: Positive for rash.   Allergic/Immunologic: Negative.    Neurological: Positive for headaches.   Hematological: Negative.    Psychiatric/Behavioral: Negative.        Objective   /88 (BP Location: Left arm, Patient Position: Sitting)  Pulse 80  Ht 72\" (182.9 cm)  Wt (!) 327 lb (148 kg)  SpO2 96%  BMI 44.35 kg/m2  Lab Results (most recent)     None        Physical Exam   Constitutional: He is oriented to person, place, and time. He appears well-developed and well-nourished. No distress.   HENT:   Head: Normocephalic and atraumatic.   Eyes: Conjunctivae and EOM are normal. Pupils are equal, round, and reactive to light.   Neck: Normal range of motion. Neck supple. No JVD present. No tracheal deviation present.   Cardiovascular: Normal rate, regular rhythm, normal heart sounds and intact distal pulses.    Pulmonary/Chest: Effort normal and breath sounds normal.   Abdominal: Soft. Bowel sounds are normal. He exhibits no distension and no mass. There is no tenderness. There is no rebound and no guarding.   Musculoskeletal: Normal range of motion. He exhibits no edema, tenderness or deformity.   Neurological: He is alert and oriented to person, place, and time.   Skin: Skin is warm and dry. No rash noted. No erythema. No pallor.   Psychiatric: He has a normal mood and affect. His behavior is normal. Judgment and thought content normal.   Nursing note and vitals reviewed.        Procedure     ECG 12 Lead  Date/Time: 10/5/2017 2:38 " "PM  Performed by: RENETTA ESTEVEZ  Authorized by: RENETTA ESTEVEZ   Comments: Chest pain  Sinus rhythm, left axis deviation, incomplete right bundle branch block pattern, PAC noted, no acute changes noted.               Assessment/Plan      Diagnosis Plan   1. Precordial pain  ECG 12 Lead    Cardiac Event Monitor   2. Palpitations  Cardiac Event Monitor   3. Coronary artery disease involving native coronary artery of native heart without angina pectoris  Cardiac Event Monitor     The patient feels improved post stenting.  He did have a recent episode of chest pain which she feels was secondary to \"A. fib\" all as outlined above.  He continues to have that at this time.  I will schedule for an event monitor so we can see what type or rhythm issues he is having when he experiences the same.  He is completely revascularized at this time.  I feel no further form of ischemia assessment is warranted at this time.  I have asked that he discontinue aspirin but continue Plavix and anticoagulation.  As soon as we have event monitor results available.         Answers for HPI/ROS submitted by the patient on 10/3/2017   Shortness of breath  Chronicity: chronic  Onset: more than 1 year ago  Frequency: constantly  Progression since onset: gradually worsening  Episode duration: 22 hours  claudication: No  coryza: No  hemoptysis: No  leg pain: Yes  orthopnea: Yes  PND: Yes  sputum production: No  swollen glands: No  syncope: Yes  Aggravating factors: exercise, any activity    "

## 2017-10-23 ENCOUNTER — OUTSIDE FACILITY SERVICE (OUTPATIENT)
Dept: CARDIOLOGY | Facility: CLINIC | Age: 47
End: 2017-10-23

## 2017-10-23 PROCEDURE — 93272 ECG/REVIEW INTERPRET ONLY: CPT | Performed by: INTERNAL MEDICINE

## 2017-10-25 ENCOUNTER — OFFICE VISIT (OUTPATIENT)
Dept: CARDIOLOGY | Facility: CLINIC | Age: 47
End: 2017-10-25

## 2017-10-25 VITALS
HEIGHT: 72 IN | DIASTOLIC BLOOD PRESSURE: 86 MMHG | HEART RATE: 80 BPM | WEIGHT: 315 LBS | BODY MASS INDEX: 42.66 KG/M2 | SYSTOLIC BLOOD PRESSURE: 138 MMHG | OXYGEN SATURATION: 98 %

## 2017-10-25 DIAGNOSIS — R00.2 PALPITATIONS: ICD-10-CM

## 2017-10-25 DIAGNOSIS — R06.02 SHORTNESS OF BREATH: Primary | ICD-10-CM

## 2017-10-25 DIAGNOSIS — R07.9 CHEST PAIN, UNSPECIFIED TYPE: ICD-10-CM

## 2017-10-25 PROCEDURE — 99213 OFFICE O/P EST LOW 20 MIN: CPT | Performed by: PHYSICIAN ASSISTANT

## 2017-10-25 NOTE — PROGRESS NOTES
"Problem list     Subjective   Ricardo Crespo is a 47 y.o. male     Chief Complaint   Patient presents with   • Chest Pain     presents for follow up   • Shortness of Breath   • Atrial Fibrillation       HPI    Problem List:  1. Coronary artery disease  1.1. Stenting of the LAD, 11/2015, per Dr. Moss.  1.2. Repeat catheterization 2-3 weeks after stenting as above, indicating patent stent with 40% PDA stenosis. Medical management was recommended.  1.3. Repeat catheterization, 01/2016, indicating nonobstructive disease with patent stent. Medical management was recommended.  1.4.  Stenting of the PDA, 03/2017, in the setting of low-level symptoms and abnormal stress test.  Previous stent to the LAD was patent.  The patient had nonobstructive disease otherwise.  Medical management was recommended.  1.5.  Stenting of the mid RCA and PTCA only of the distal LAD, 09/17.  2. Preserved systolic function  3. Hypertension  4. Dyslipidemia  5. Diabetes mellitus  6. Sleep apnea  7. Reported history of DVT.     Patient is a 47-year-old male that presents back to our office for follow-up.  Patient was all last office visit complaining of palpitations and chest pain.  Recently had interventions in the LAD and RCA and it done well but experiences palpitations.  Patient describes his chest pain is primarily with palpitations.  He will fill his heart \"get out of rhythm\".  He describes to me that he has history of atrial fibrillation although from reviewing prior cardiology notes a prior cardiologist notes, no history can be seen.  Patient does describe that he has this diagnosis.  Event monitoring demonstrates PACs but does not demonstrate any evidence of A. fib.  End of service report not available at this time.    He has mild dyspnea when exerting but nothing progressive does not complain of PND orthopnea.  Otherwise is doing well      Outpatient Encounter Prescriptions as of 10/25/2017   Medication Sig Dispense Refill   • " clopidogrel (PLAVIX) 75 MG tablet Take 75 mg by mouth Daily.  11   • ELIQUIS 5 MG tablet tablet Take 5 mg by mouth Daily.     • furosemide (LASIX) 20 MG tablet Take 20 mg by mouth Daily.  1   • Insulin Glargine (BASAGLAR KWIKPEN) 100 UNIT/ML injection pen Inject 140 Units under the skin Daily. 32 units daily  1   • lithium carbonate 300 MG capsule 5 x daily  2   • nitroglycerin (NITROSTAT) 0.4 MG SL tablet PLACE 1 TABLET UNDER THE TONGUE EVERY 5 MINUTES UP TO 3 TABLETS IN 15 MINUTES FOR CHEST PAIN. IF NO RELIEF GO TO THE EMERGENCY ROOM OR CALL 25 tablet 2   • sertraline (ZOLOFT) 100 MG tablet 200 mg Daily.  2   • Sotalol HCl AF 80 MG tablet Take 1 tablet by mouth 2 (Two) Times a Day. 60 tablet 11   • [DISCONTINUED] ASPIR-LOW 81 MG EC tablet Take 81 mg by mouth Daily.  2   • [DISCONTINUED] dronedarone (MULTAQ) 400 MG tablet Take 1 tablet by mouth 2 (Two) Times a Day With Meals. 60 tablet 6   • [DISCONTINUED] rosuvastatin (CRESTOR) 10 MG tablet Take 1 tablet by mouth Daily. 30 tablet 11     No facility-administered encounter medications on file as of 10/25/2017.        Beta adrenergic blockers; Calcium channel blockers; and Imdur [isosorbide dinitrate]    Past Medical History:   Diagnosis Date   • Anxiety    • Benign hypertension    • Coronary artery disease    • Depression    • DVT (deep venous thrombosis)     Questionable history of deep venous thrombosis.   • Dyslipidemia    • Hyperlipidemia    • JIM on CPAP     Obstructive sleep apnea, compliant with CPAP.    • Type 2 diabetes mellitus        Social History     Social History   • Marital status:      Spouse name: N/A   • Number of children: N/A   • Years of education: N/A     Occupational History   • Not on file.     Social History Main Topics   • Smoking status: Former Smoker   • Smokeless tobacco: Never Used   • Alcohol use No   • Drug use: No   • Sexual activity: Not on file     Other Topics Concern   • Not on file     Social History Narrative  "      Family History   Problem Relation Age of Onset   • Heart disease Father    • Nephrolithiasis Father    • Atrial fibrillation Mother    • No Known Problems Sister        Review of Systems   Constitutional: Positive for fatigue.   Eyes: Negative.    Respiratory: Positive for shortness of breath and wheezing.    Cardiovascular: Positive for chest pain, palpitations and leg swelling.   Gastrointestinal: Negative.    Endocrine: Negative.    Genitourinary: Negative.    Musculoskeletal: Positive for arthralgias.   Skin: Negative.    Allergic/Immunologic: Negative.    Neurological: Positive for headaches.   Hematological: Bruises/bleeds easily.   Psychiatric/Behavioral: Negative.        Objective     /86 (BP Location: Left arm, Patient Position: Sitting)  Pulse 80  Ht 72\" (182.9 cm)  Wt (!) 324 lb (147 kg)  SpO2 98%  BMI 43.94 kg/m2    Lab Results (most recent)     None          Physical Exam   Constitutional: He is oriented to person, place, and time. He appears well-developed and well-nourished. No distress.   HENT:   Head: Normocephalic and atraumatic.   Eyes: EOM are normal. Pupils are equal, round, and reactive to light.   Neck: No JVD present.   Cardiovascular: Normal rate, regular rhythm and normal heart sounds.  Exam reveals no gallop and no friction rub.    No murmur heard.  Pulmonary/Chest: Effort normal and breath sounds normal. No respiratory distress. He has no wheezes. He has no rales. He exhibits no tenderness.   Abdominal: Soft.   Musculoskeletal: Normal range of motion. He exhibits no edema.   Neurological: He is alert and oriented to person, place, and time. No cranial nerve deficit.   Skin: Skin is warm and dry. No rash noted. No erythema. No pallor.   Psychiatric: He has a normal mood and affect. His behavior is normal.   Nursing note and vitals reviewed.      Procedure   Procedures       Assessment/Plan     Problems Addressed this Visit        Cardiovascular and Mediastinum    " Palpitations       Respiratory    Shortness of breath - Primary       Nervous and Auditory    Chest pain            Recommendation  1.  Patient complains of palpitations with event monitoring demonstrating PACs.  He cannot tolerate beta blockers and calcium channel blockers.  On further questioning, he states that the reason he has because of hypotension that he has experienced in the past.  We will we would like to try antiarrhythmic medicine since he has felt some of the other medications.  We cannot tolerate flecainide because of prior history of MI.  I would try low-dose sotalol therapy.  We'll start this on Saturday and report primary on Monday.  EKG will be faxed to our office.  We will see back in 3-4 weeks to evaluate and follow up further.  Next line 2.  Otherwise patient doing well post intervention.  Chest pain does not appear ischemic in nature.  Follow-up primary as scheduled       Answers for HPI/ROS submitted by the patient on 10/24/2017   Shortness of breath  Chronicity: recurrent  Onset: more than 1 month ago  Frequency: daily  Progression since onset: gradually worsening  Episode duration: 5 minutes  Aggravating factors: emotional upset, occupational exposure, exercise, any activity

## 2017-10-26 ENCOUNTER — TELEPHONE (OUTPATIENT)
Dept: CARDIOLOGY | Facility: CLINIC | Age: 47
End: 2017-10-26

## 2017-10-26 NOTE — TELEPHONE ENCOUNTER
----- Message from Ricardo Crespo sent at 10/25/2017  5:39 PM EDT -----  Regarding: Non-Urgent Medical Question  Contact: [phone]  I was in office today I seen dr. Nuñez regarding a heart monitor and chest pain and shortness of breath.  I am allergic to beta blockers as noted on my chart prescribed me sotalol irregular heart rythm the pharmacist said it is a beta blocker.  Dr. Nuñez said it was not. When a doctor looks at a computer more than a patient I have issues.  I cant go up a flight of stairs without losing my breath and pressure/pain in my chest something is wrong.  I don't feel I am receiving appropriate treatment.  Respectfully  tian    Discussed with Grzegorz Daugherty PA-C. Per Grzegorz patient to come in tomorrow for Nurse Visit for Symptom Check and he will sit down and discuss these issues with him.

## 2017-12-07 ENCOUNTER — OFFICE VISIT (OUTPATIENT)
Dept: CARDIOLOGY | Facility: CLINIC | Age: 47
End: 2017-12-07

## 2017-12-07 VITALS
SYSTOLIC BLOOD PRESSURE: 123 MMHG | OXYGEN SATURATION: 98 % | HEART RATE: 100 BPM | DIASTOLIC BLOOD PRESSURE: 86 MMHG | WEIGHT: 315 LBS | BODY MASS INDEX: 42.66 KG/M2 | HEIGHT: 72 IN

## 2017-12-07 DIAGNOSIS — I10 BENIGN HYPERTENSION: ICD-10-CM

## 2017-12-07 DIAGNOSIS — R00.2 PALPITATIONS: ICD-10-CM

## 2017-12-07 DIAGNOSIS — I25.10 CORONARY ARTERY DISEASE INVOLVING NATIVE CORONARY ARTERY OF NATIVE HEART WITHOUT ANGINA PECTORIS: Primary | ICD-10-CM

## 2017-12-07 PROCEDURE — 99213 OFFICE O/P EST LOW 20 MIN: CPT | Performed by: PHYSICIAN ASSISTANT

## 2017-12-07 NOTE — PROGRESS NOTES
Problem list     Subjective   Ricardo Crespo is a 47 y.o. male     Chief Complaint   Patient presents with   • Follow-up     patient appears in office today for hospital follow up    • Shortness of Breath     patient states he is having incresed episodes of SOA    • Chest Pain     patient states he is having midsternal CP ; radiating into LUE and jaw; occasionally into shoulder blades   Problem List:  1. Coronary artery disease  1.1. Stenting of the LAD, 11/2015, per Dr. Moss.  1.2. Repeat catheterization 2-3 weeks after stenting as above, indicating patent stent with 40% PDA stenosis. Medical management was recommended.  1.3. Repeat catheterization, 01/2016, indicating nonobstructive disease with patent stent. Medical management was recommended.  1.4.  Stenting of the PDA, 03/2017, in the setting of low-level symptoms and abnormal stress test.  Previous stent to the LAD was patent.  The patient had nonobstructive disease otherwise.  Medical management was recommended.  1.5.  Stenting of the mid RCA and PTCA only of the distal LAD, 09/17.  1.6.  Repeat LHC, 11/17, in the setting of acute chest pain.  The patient had 50% LAD stenosis, patent stents, and non-obstructive CAD otherwise.  2. Preserved systolic function  3. Hypertension  4. Dyslipidemia  5. Diabetes mellitus  6. Sleep apnea  7. Reported history of DVT.    HPI  The patient presents in today for follow-up.  He did have catheterization late November after presenting to the hospital with chest pain.  He had findings as above.  There was also concern at one time about palpitations.  An event monitor was obtained which indicated sinus rhythm with PACs.  The patient tells me that his palpitations persist but are minimal for the most part at this time.  His chest pain is minimal as well.  His dyspnea is stable.  The patient has no dizziness or syncope.  He is tolerating current medications without complication.  He did have recent labs including lipids.  He  tells me that his lipid parameters were very poorly treated.  The patient cannot tolerate statin therapy in the past.  Mother concern for him has been his diagnosis of cirrhosis.  I have not pursued institution of statin in that setting.  At this time, the patient tells me that he is for the most part stable from cardiac standpoint has no specific issues otherwise.    Current Outpatient Prescriptions   Medication Sig Dispense Refill   • clopidogrel (PLAVIX) 75 MG tablet Take 75 mg by mouth Daily.  11   • ELIQUIS 5 MG tablet tablet Take 5 mg by mouth Daily.     • furosemide (LASIX) 20 MG tablet Take 20 mg by mouth Daily.  1   • Insulin Glargine (BASAGLAR KWIKPEN) 100 UNIT/ML injection pen Inject 140 Units under the skin Daily. 32 units daily  1   • lithium carbonate 300 MG capsule 5 x daily  2   • nitroglycerin (NITROSTAT) 0.4 MG SL tablet PLACE 1 TABLET UNDER THE TONGUE EVERY 5 MINUTES UP TO 3 TABLETS IN 15 MINUTES FOR CHEST PAIN. IF NO RELIEF GO TO THE EMERGENCY ROOM OR CALL 25 tablet 2   • sertraline (ZOLOFT) 100 MG tablet 200 mg Daily.  2   • Sotalol HCl AF 80 MG tablet Take 1 tablet by mouth 2 (Two) Times a Day. 60 tablet 11     No current facility-administered medications for this visit.        Beta adrenergic blockers; Calcium channel blockers; and Imdur [isosorbide dinitrate]    Past Medical History:   Diagnosis Date   • Anxiety    • Benign hypertension    • Coronary artery disease    • Depression    • DVT (deep venous thrombosis)     Questionable history of deep venous thrombosis.   • Dyslipidemia    • Hyperlipidemia    • JIM on CPAP     Obstructive sleep apnea, compliant with CPAP.    • Type 2 diabetes mellitus        Social History     Social History   • Marital status:      Spouse name: N/A   • Number of children: N/A   • Years of education: N/A     Occupational History   • Not on file.     Social History Main Topics   • Smoking status: Former Smoker   • Smokeless tobacco: Never Used   • Alcohol  use No   • Drug use: No   • Sexual activity: Defer     Other Topics Concern   • Not on file     Social History Narrative       Family History   Problem Relation Age of Onset   • Heart disease Father    • Nephrolithiasis Father    • Atrial fibrillation Mother    • No Known Problems Sister        Review of Systems   Constitutional: Negative.  Negative for fatigue and fever.   HENT: Negative for congestion, ear pain, hearing loss, rhinorrhea and sore throat.    Eyes: Positive for visual disturbance (wears readomg glasses ).   Respiratory: Positive for apnea (JIM with CPAP use), shortness of breath (increased epsidoes of SOA) and wheezing (late night wheezing ). Negative for cough and chest tightness.    Cardiovascular: Positive for chest pain (midsternal; radiating into LUE and jaw, occasional into shouldre blades) and leg swelling (RLE swelling/edema).   Gastrointestinal: Negative.  Negative for abdominal distention, abdominal pain, diarrhea, nausea and vomiting.   Endocrine: Negative.  Negative for cold intolerance, heat intolerance, polyphagia and polyuria.   Genitourinary: Negative.  Negative for difficulty urinating, frequency and urgency.   Musculoskeletal: Negative.  Negative for arthralgias, back pain, myalgias, neck pain and neck stiffness.   Skin: Negative.  Negative for rash and wound.   Allergic/Immunologic: Negative.  Negative for environmental allergies and food allergies.   Neurological: Positive for headaches (frequent H/A). Negative for dizziness, weakness and light-headedness.   Hematological: Bruises/bleeds easily (bruises and bleeds easily).   Psychiatric/Behavioral: Positive for agitation (easily agitated) and confusion (easily cinfused). Negative for sleep disturbance (denies waking up smothering/SOA ). The patient is nervous/anxious (easily nervous/anxious).        Objective   Vitals:    12/07/17 1408   BP: 123/86   BP Location: Left arm   Patient Position: Sitting   Pulse: 100   SpO2: 98%  "  Weight: (!) 147 kg (323 lb)   Height: 182.9 cm (72\")      /86 (BP Location: Left arm, Patient Position: Sitting)  Pulse 100  Ht 182.9 cm (72\")  Wt (!) 147 kg (323 lb)  SpO2 98%  BMI 43.81 kg/m2   Lab Results (most recent)     None        Physical Exam   Constitutional: He is oriented to person, place, and time. He appears well-developed and well-nourished. No distress.   HENT:   Head: Normocephalic and atraumatic.   Eyes: Conjunctivae and EOM are normal. Pupils are equal, round, and reactive to light.   Neck: Normal range of motion. Neck supple. No JVD present. No tracheal deviation present.   Cardiovascular: Normal rate, regular rhythm, normal heart sounds and intact distal pulses.    Pulmonary/Chest: Effort normal and breath sounds normal.   Abdominal: Soft. Bowel sounds are normal. He exhibits no distension and no mass. There is no tenderness. There is no rebound and no guarding.   Musculoskeletal: Normal range of motion. He exhibits no edema, tenderness or deformity.   Neurological: He is alert and oriented to person, place, and time.   Skin: Skin is warm and dry. No rash noted. No erythema. No pallor.   Psychiatric: He has a normal mood and affect. His behavior is normal. Judgment and thought content normal.   Nursing note and vitals reviewed.        Procedure   Procedures       Assessment/Plan      Diagnosis Plan   1. Coronary artery disease involving native coronary artery of native heart without angina pectoris     2. Benign hypertension     3. Palpitations       The patient is doing well for the most part at this time.  I will continue medications.  I do feel he needs consideration for injectable cholesterol medications.  He has felt at least to statin therapies.  I will not rechallenge statin therapy at this time given his history of diagnosis of cirrhosis.  We will obtain his lipid parameters/panel from his primary care provider performed approximately one week ago.  We can then recommend him " further.  I would like to see him back in 2-3 months just to reevaluate course.  He will call for any issues prior to follow-up.                 Electronically signed by:      Answers for HPI/ROS submitted by the patient on 12/4/2017   Shortness of breath  Chronicity: recurrent  Onset: more than 1 month ago  Frequency: daily  Progression since onset: gradually worsening  Episode duration: 5 minutes  coryza: No  hemoptysis: No  leg pain: No  orthopnea: Yes  PND: Yes  sputum production: No  swollen glands: No  syncope: No  Aggravating factors: any activity

## 2017-12-21 DIAGNOSIS — R07.89 OTHER CHEST PAIN: ICD-10-CM

## 2017-12-21 RX ORDER — NITROGLYCERIN 0.4 MG/1
TABLET SUBLINGUAL
Qty: 25 TABLET | Refills: 2 | Status: ON HOLD | OUTPATIENT
Start: 2017-12-21 | End: 2018-01-07

## 2018-01-02 DIAGNOSIS — I25.119 CORONARY ARTERY DISEASE INVOLVING NATIVE CORONARY ARTERY OF NATIVE HEART WITH ANGINA PECTORIS (HCC): Primary | ICD-10-CM

## 2018-01-02 RX ORDER — APIXABAN 5 MG/1
5 TABLET, FILM COATED ORAL EVERY 12 HOURS SCHEDULED
Qty: 60 TABLET | Refills: 6 | Status: SHIPPED | OUTPATIENT
Start: 2018-01-02 | End: 2018-01-12 | Stop reason: SDUPTHER

## 2018-01-07 ENCOUNTER — APPOINTMENT (OUTPATIENT)
Dept: GENERAL RADIOLOGY | Facility: HOSPITAL | Age: 48
End: 2018-01-07

## 2018-01-07 ENCOUNTER — HOSPITAL ENCOUNTER (OUTPATIENT)
Facility: HOSPITAL | Age: 48
Setting detail: OBSERVATION
Discharge: LEFT BEFORE DISCHARGE | End: 2018-01-08
Attending: INTERNAL MEDICINE | Admitting: INTERNAL MEDICINE

## 2018-01-07 ENCOUNTER — APPOINTMENT (OUTPATIENT)
Dept: CT IMAGING | Facility: HOSPITAL | Age: 48
End: 2018-01-07

## 2018-01-07 ENCOUNTER — APPOINTMENT (OUTPATIENT)
Dept: CARDIOLOGY | Facility: HOSPITAL | Age: 48
End: 2018-01-07
Attending: INTERNAL MEDICINE

## 2018-01-07 VITALS
RESPIRATION RATE: 18 BRPM | OXYGEN SATURATION: 94 % | HEART RATE: 67 BPM | SYSTOLIC BLOOD PRESSURE: 125 MMHG | DIASTOLIC BLOOD PRESSURE: 79 MMHG | TEMPERATURE: 97.4 F | BODY MASS INDEX: 42.66 KG/M2 | HEIGHT: 72 IN | WEIGHT: 315 LBS

## 2018-01-07 LAB
6-ACETYL MORPHINE: NEGATIVE
A-A DO2: 7.6 MMHG (ref 0–300)
ALBUMIN SERPL-MCNC: 4.1 G/DL (ref 3.5–5)
ALBUMIN/GLOB SERPL: 1.4 G/DL (ref 1.5–2.5)
ALP SERPL-CCNC: 81 U/L (ref 40–129)
ALT SERPL W P-5'-P-CCNC: 53 U/L (ref 10–44)
AMPHET+METHAMPHET UR QL: NEGATIVE
AMYLASE SERPL-CCNC: 24 U/L (ref 28–100)
ANION GAP SERPL CALCULATED.3IONS-SCNC: 7.8 MMOL/L (ref 3.6–11.2)
ARTERIAL PATENCY WRIST A: ABNORMAL
AST SERPL-CCNC: 52 U/L (ref 10–34)
ATMOSPHERIC PRESS: 735 MMHG
BARBITURATES UR QL SCN: NEGATIVE
BASE EXCESS BLDA CALC-SCNC: -2.5 MMOL/L
BASOPHILS # BLD AUTO: 0.07 10*3/MM3 (ref 0–0.3)
BASOPHILS NFR BLD AUTO: 0.5 % (ref 0–2)
BDY SITE: ABNORMAL
BENZODIAZ UR QL SCN: NEGATIVE
BH CV ECHO MEAS - % IVS THICK: 94.7 %
BH CV ECHO MEAS - % LVPW THICK: 89.5 %
BH CV ECHO MEAS - ACS: 2.7 CM
BH CV ECHO MEAS - AO MAX PG: 7.2 MMHG
BH CV ECHO MEAS - AO MEAN PG: 4.5 MMHG
BH CV ECHO MEAS - AO ROOT AREA (BSA CORRECTED): 1.5
BH CV ECHO MEAS - AO ROOT AREA: 11.5 CM^2
BH CV ECHO MEAS - AO ROOT DIAM: 3.8 CM
BH CV ECHO MEAS - AO V2 MAX: 133.8 CM/SEC
BH CV ECHO MEAS - AO V2 MEAN: 100.9 CM/SEC
BH CV ECHO MEAS - AO V2 VTI: 32.3 CM
BH CV ECHO MEAS - BSA(HAYCOCK): 2.8 M^2
BH CV ECHO MEAS - BSA: 2.6 M^2
BH CV ECHO MEAS - BZI_BMI: 43.4 KILOGRAMS/M^2
BH CV ECHO MEAS - BZI_METRIC_HEIGHT: 182.9 CM
BH CV ECHO MEAS - BZI_METRIC_WEIGHT: 145.2 KG
BH CV ECHO MEAS - CONTRAST EF 4CH: 61.6 ML/M^2
BH CV ECHO MEAS - EDV(CUBED): 153.7 ML
BH CV ECHO MEAS - EDV(MOD-SP4): 99 ML
BH CV ECHO MEAS - EDV(TEICH): 138.7 ML
BH CV ECHO MEAS - EF(CUBED): 75.8 %
BH CV ECHO MEAS - EF(TEICH): 67.3 %
BH CV ECHO MEAS - ESV(CUBED): 37.2 ML
BH CV ECHO MEAS - ESV(MOD-SP4): 38 ML
BH CV ECHO MEAS - ESV(TEICH): 45.4 ML
BH CV ECHO MEAS - FS: 37.7 %
BH CV ECHO MEAS - IVS/LVPW: 1.2
BH CV ECHO MEAS - IVSD: 1.3 CM
BH CV ECHO MEAS - IVSS: 2.5 CM
BH CV ECHO MEAS - LA DIMENSION: 5.2 CM
BH CV ECHO MEAS - LA/AO: 1.4
BH CV ECHO MEAS - LV DIASTOLIC VOL/BSA (35-75): 38.1 ML/M^2
BH CV ECHO MEAS - LV MASS(C)D: 256.3 GRAMS
BH CV ECHO MEAS - LV MASS(C)DI: 98.5 GRAMS/M^2
BH CV ECHO MEAS - LV MASS(C)S: 378.5 GRAMS
BH CV ECHO MEAS - LV MASS(C)SI: 145.5 GRAMS/M^2
BH CV ECHO MEAS - LV SYSTOLIC VOL/BSA (12-30): 14.6 ML/M^2
BH CV ECHO MEAS - LVIDD: 5.4 CM
BH CV ECHO MEAS - LVIDS: 3.3 CM
BH CV ECHO MEAS - LVLD AP4: 8.6 CM
BH CV ECHO MEAS - LVLS AP4: 7.6 CM
BH CV ECHO MEAS - LVOT AREA (M): 4.2 CM^2
BH CV ECHO MEAS - LVOT AREA: 4.1 CM^2
BH CV ECHO MEAS - LVOT DIAM: 2.3 CM
BH CV ECHO MEAS - LVPWD: 1.1 CM
BH CV ECHO MEAS - LVPWS: 2.1 CM
BH CV ECHO MEAS - MV A MAX VEL: 61.7 CM/SEC
BH CV ECHO MEAS - MV E MAX VEL: 98.2 CM/SEC
BH CV ECHO MEAS - MV E/A: 1.6
BH CV ECHO MEAS - PA ACC SLOPE: 1328 CM/SEC^2
BH CV ECHO MEAS - PA ACC TIME: 0.1 SEC
BH CV ECHO MEAS - PA PR(ACCEL): 36.2 MMHG
BH CV ECHO MEAS - RVDD: 3 CM
BH CV ECHO MEAS - SI(AO): 143 ML/M^2
BH CV ECHO MEAS - SI(CUBED): 44.8 ML/M^2
BH CV ECHO MEAS - SI(MOD-SP4): 23.4 ML/M^2
BH CV ECHO MEAS - SI(TEICH): 35.9 ML/M^2
BH CV ECHO MEAS - SV(AO): 371.9 ML
BH CV ECHO MEAS - SV(CUBED): 116.5 ML
BH CV ECHO MEAS - SV(MOD-SP4): 61 ML
BH CV ECHO MEAS - SV(TEICH): 93.3 ML
BILIRUB SERPL-MCNC: 0.7 MG/DL (ref 0.2–1.8)
BILIRUB UR QL STRIP: NEGATIVE
BODY TEMPERATURE: 98.6 C
BUN BLD-MCNC: 10 MG/DL (ref 7–21)
BUN/CREAT SERPL: 11.4 (ref 7–25)
BUPRENORPHINE SERPL-MCNC: NEGATIVE NG/ML
CALCIUM SPEC-SCNC: 8.7 MG/DL (ref 7.7–10)
CANNABINOIDS SERPL QL: NEGATIVE
CHLORIDE SERPL-SCNC: 113 MMOL/L (ref 99–112)
CHOLEST SERPL-MCNC: 148 MG/DL (ref 0–200)
CK MB SERPL-CCNC: 1.1 NG/ML (ref 0–5)
CK MB SERPL-CCNC: 1.49 NG/ML (ref 0–5)
CK MB SERPL-CCNC: 1.69 NG/ML (ref 0–5)
CK MB SERPL-CCNC: 1.71 NG/ML (ref 0–5)
CK MB SERPL-RTO: 1.8 % (ref 0–3)
CK MB SERPL-RTO: 2.6 % (ref 0–3)
CK SERPL-CCNC: 57 U/L (ref 24–204)
CK SERPL-CCNC: 60 U/L (ref 24–204)
CK SERPL-CCNC: 61 U/L (ref 24–204)
CK SERPL-CCNC: 63 U/L (ref 24–204)
CLARITY UR: CLEAR
CO2 SERPL-SCNC: 20.2 MMOL/L (ref 24.3–31.9)
COCAINE UR QL: NEGATIVE
COHGB MFR BLD: 1.2 % (ref 0–5)
COLOR UR: ABNORMAL
CREAT BLD-MCNC: 0.88 MG/DL (ref 0.43–1.29)
CRP SERPL-MCNC: 0.61 MG/DL (ref 0–0.99)
DEPRECATED RDW RBC AUTO: 39.7 FL (ref 37–54)
EOSINOPHIL # BLD AUTO: 0.5 10*3/MM3 (ref 0–0.7)
EOSINOPHIL NFR BLD AUTO: 3.7 % (ref 0–5)
ERYTHROCYTE [DISTWIDTH] IN BLOOD BY AUTOMATED COUNT: 13.9 % (ref 11.5–14.5)
GFR SERPL CREATININE-BSD FRML MDRD: 92 ML/MIN/1.73
GLOBULIN UR ELPH-MCNC: 2.9 GM/DL
GLUCOSE BLD-MCNC: 165 MG/DL (ref 70–110)
GLUCOSE BLDC GLUCOMTR-MCNC: 148 MG/DL (ref 70–130)
GLUCOSE BLDC GLUCOMTR-MCNC: 158 MG/DL (ref 70–130)
GLUCOSE BLDC GLUCOMTR-MCNC: 190 MG/DL (ref 70–130)
GLUCOSE BLDC GLUCOMTR-MCNC: 208 MG/DL (ref 70–130)
GLUCOSE UR STRIP-MCNC: NEGATIVE MG/DL
HBA1C MFR BLD: 6.9 % (ref 4.5–5.7)
HCO3 BLDA-SCNC: 23.2 MMOL/L (ref 22–26)
HCT VFR BLD AUTO: 36.4 % (ref 42–52)
HCT VFR BLD CALC: 39 % (ref 42–52)
HDLC SERPL-MCNC: 30 MG/DL (ref 60–100)
HGB BLD-MCNC: 12.3 G/DL (ref 14–18)
HGB BLDA-MCNC: 13.2 G/DL (ref 12–16)
HGB UR QL STRIP.AUTO: NEGATIVE
HOROWITZ INDEX BLD+IHG-RTO: 21 %
IMM GRANULOCYTES # BLD: 0.04 10*3/MM3 (ref 0–0.03)
IMM GRANULOCYTES NFR BLD: 0.3 % (ref 0–0.5)
INR PPP: 1.27 (ref 0.9–1.1)
KETONES UR QL STRIP: NEGATIVE
LDLC SERPL CALC-MCNC: 88 MG/DL (ref 0–100)
LDLC/HDLC SERPL: 2.93 {RATIO}
LEUKOCYTE ESTERASE UR QL STRIP.AUTO: NEGATIVE
LIPASE SERPL-CCNC: 40 U/L (ref 13–60)
LITHIUM SERPL-SCNC: 1 MMOL/L (ref 0.6–1.2)
LYMPHOCYTES # BLD AUTO: 1.79 10*3/MM3 (ref 1–3)
LYMPHOCYTES NFR BLD AUTO: 13.1 % (ref 21–51)
MAGNESIUM SERPL-MCNC: 2 MG/DL (ref 1.7–2.6)
MAXIMAL PREDICTED HEART RATE: 171 BPM
MCH RBC QN AUTO: 27.3 PG (ref 27–33)
MCHC RBC AUTO-ENTMCNC: 33.8 G/DL (ref 33–37)
MCV RBC AUTO: 80.9 FL (ref 80–94)
METHADONE UR QL SCN: NEGATIVE
METHGB BLD QL: 0.2 % (ref 0–3)
MODALITY: ABNORMAL
MONOCYTES # BLD AUTO: 0.84 10*3/MM3 (ref 0.1–0.9)
MONOCYTES NFR BLD AUTO: 6.1 % (ref 0–10)
MYOGLOBIN SERPL-MCNC: 46 NG/ML (ref 0–109)
MYOGLOBIN SERPL-MCNC: 48 NG/ML (ref 0–109)
MYOGLOBIN SERPL-MCNC: 49 NG/ML (ref 0–109)
NEUTROPHILS # BLD AUTO: 10.42 10*3/MM3 (ref 1.4–6.5)
NEUTROPHILS NFR BLD AUTO: 76.3 % (ref 30–70)
NITRITE UR QL STRIP: NEGATIVE
OPIATES UR QL: POSITIVE
OSMOLALITY SERPL CALC.SUM OF ELEC: 284 MOSM/KG (ref 273–305)
OXYCODONE UR QL SCN: NEGATIVE
OXYHGB MFR BLDV: 94.9 % (ref 85–100)
PCO2 BLDA: 43.2 MM HG (ref 35–45)
PCP UR QL SCN: NEGATIVE
PH BLDA: 7.35 PH UNITS (ref 7.35–7.45)
PH UR STRIP.AUTO: 6 [PH] (ref 5–8)
PHOSPHATE SERPL-MCNC: 3.8 MG/DL (ref 2.7–4.5)
PLATELET # BLD AUTO: 149 10*3/MM3 (ref 130–400)
PMV BLD AUTO: 10.3 FL (ref 6–10)
PO2 BLDA: 85.1 MM HG (ref 80–100)
POTASSIUM BLD-SCNC: 3.5 MMOL/L (ref 3.5–5.3)
PROT SERPL-MCNC: 7 G/DL (ref 6–8)
PROT UR QL STRIP: NEGATIVE
PROTHROMBIN TIME: 16.1 SECONDS (ref 11–15.4)
RBC # BLD AUTO: 4.5 10*6/MM3 (ref 4.7–6.1)
SAO2 % BLDCOA: 96.2 % (ref 90–100)
SODIUM BLD-SCNC: 141 MMOL/L (ref 135–153)
SP GR UR STRIP: 1.02 (ref 1–1.03)
STRESS TARGET HR: 145 BPM
TRIGL SERPL-MCNC: 151 MG/DL (ref 0–150)
TROPONIN I SERPL-MCNC: <0.006 NG/ML
TSH SERPL DL<=0.05 MIU/L-ACNC: 3.71 MIU/ML (ref 0.55–4.78)
UROBILINOGEN UR QL STRIP: ABNORMAL
VLDLC SERPL-MCNC: 30.2 MG/DL
WBC NRBC COR # BLD: 13.66 10*3/MM3 (ref 4.5–12.5)

## 2018-01-07 PROCEDURE — G0378 HOSPITAL OBSERVATION PER HR: HCPCS

## 2018-01-07 PROCEDURE — 80307 DRUG TEST PRSMV CHEM ANLYZR: CPT | Performed by: INTERNAL MEDICINE

## 2018-01-07 PROCEDURE — 96376 TX/PRO/DX INJ SAME DRUG ADON: CPT

## 2018-01-07 PROCEDURE — 82150 ASSAY OF AMYLASE: CPT | Performed by: PHYSICIAN ASSISTANT

## 2018-01-07 PROCEDURE — 86140 C-REACTIVE PROTEIN: CPT | Performed by: INTERNAL MEDICINE

## 2018-01-07 PROCEDURE — 82962 GLUCOSE BLOOD TEST: CPT

## 2018-01-07 PROCEDURE — 82375 ASSAY CARBOXYHB QUANT: CPT | Performed by: INTERNAL MEDICINE

## 2018-01-07 PROCEDURE — 85025 COMPLETE CBC W/AUTO DIFF WBC: CPT | Performed by: INTERNAL MEDICINE

## 2018-01-07 PROCEDURE — 71046 X-RAY EXAM CHEST 2 VIEWS: CPT

## 2018-01-07 PROCEDURE — 99220 PR INITIAL OBSERVATION CARE/DAY 70 MINUTES: CPT | Performed by: INTERNAL MEDICINE

## 2018-01-07 PROCEDURE — 80053 COMPREHEN METABOLIC PANEL: CPT | Performed by: INTERNAL MEDICINE

## 2018-01-07 PROCEDURE — 71046 X-RAY EXAM CHEST 2 VIEWS: CPT | Performed by: RADIOLOGY

## 2018-01-07 PROCEDURE — 87086 URINE CULTURE/COLONY COUNT: CPT | Performed by: INTERNAL MEDICINE

## 2018-01-07 PROCEDURE — 83690 ASSAY OF LIPASE: CPT | Performed by: PHYSICIAN ASSISTANT

## 2018-01-07 PROCEDURE — 81003 URINALYSIS AUTO W/O SCOPE: CPT | Performed by: INTERNAL MEDICINE

## 2018-01-07 PROCEDURE — 93005 ELECTROCARDIOGRAM TRACING: CPT | Performed by: INTERNAL MEDICINE

## 2018-01-07 PROCEDURE — 25010000002 ONDANSETRON PER 1 MG: Performed by: INTERNAL MEDICINE

## 2018-01-07 PROCEDURE — 70450 CT HEAD/BRAIN W/O DYE: CPT

## 2018-01-07 PROCEDURE — 83735 ASSAY OF MAGNESIUM: CPT | Performed by: INTERNAL MEDICINE

## 2018-01-07 PROCEDURE — 84484 ASSAY OF TROPONIN QUANT: CPT | Performed by: INTERNAL MEDICINE

## 2018-01-07 PROCEDURE — 82550 ASSAY OF CK (CPK): CPT | Performed by: INTERNAL MEDICINE

## 2018-01-07 PROCEDURE — 85610 PROTHROMBIN TIME: CPT | Performed by: INTERNAL MEDICINE

## 2018-01-07 PROCEDURE — 96374 THER/PROPH/DIAG INJ IV PUSH: CPT

## 2018-01-07 PROCEDURE — 82553 CREATINE MB FRACTION: CPT | Performed by: INTERNAL MEDICINE

## 2018-01-07 PROCEDURE — 84100 ASSAY OF PHOSPHORUS: CPT | Performed by: INTERNAL MEDICINE

## 2018-01-07 PROCEDURE — 70450 CT HEAD/BRAIN W/O DYE: CPT | Performed by: RADIOLOGY

## 2018-01-07 PROCEDURE — 93010 ELECTROCARDIOGRAM REPORT: CPT | Performed by: INTERNAL MEDICINE

## 2018-01-07 PROCEDURE — 80061 LIPID PANEL: CPT | Performed by: INTERNAL MEDICINE

## 2018-01-07 PROCEDURE — 80178 ASSAY OF LITHIUM: CPT | Performed by: INTERNAL MEDICINE

## 2018-01-07 PROCEDURE — 83036 HEMOGLOBIN GLYCOSYLATED A1C: CPT | Performed by: INTERNAL MEDICINE

## 2018-01-07 PROCEDURE — 36600 WITHDRAWAL OF ARTERIAL BLOOD: CPT | Performed by: INTERNAL MEDICINE

## 2018-01-07 PROCEDURE — 94799 UNLISTED PULMONARY SVC/PX: CPT

## 2018-01-07 PROCEDURE — 25010000002 HYDROMORPHONE PER 4 MG: Performed by: INTERNAL MEDICINE

## 2018-01-07 PROCEDURE — 96375 TX/PRO/DX INJ NEW DRUG ADDON: CPT

## 2018-01-07 PROCEDURE — 93306 TTE W/DOPPLER COMPLETE: CPT

## 2018-01-07 PROCEDURE — 84443 ASSAY THYROID STIM HORMONE: CPT | Performed by: INTERNAL MEDICINE

## 2018-01-07 PROCEDURE — 83050 HGB METHEMOGLOBIN QUAN: CPT | Performed by: INTERNAL MEDICINE

## 2018-01-07 PROCEDURE — 82805 BLOOD GASES W/O2 SATURATION: CPT | Performed by: INTERNAL MEDICINE

## 2018-01-07 PROCEDURE — 83874 ASSAY OF MYOGLOBIN: CPT | Performed by: INTERNAL MEDICINE

## 2018-01-07 RX ORDER — NICOTINE POLACRILEX 4 MG
15 LOZENGE BUCCAL
Status: DISCONTINUED | OUTPATIENT
Start: 2018-01-07 | End: 2018-01-08 | Stop reason: HOSPADM

## 2018-01-07 RX ORDER — PANTOPRAZOLE SODIUM 40 MG/1
40 TABLET, DELAYED RELEASE ORAL
Status: DISCONTINUED | OUTPATIENT
Start: 2018-01-07 | End: 2018-01-08 | Stop reason: HOSPADM

## 2018-01-07 RX ORDER — CLOPIDOGREL BISULFATE 75 MG/1
75 TABLET ORAL DAILY
Status: DISCONTINUED | OUTPATIENT
Start: 2018-01-07 | End: 2018-01-08 | Stop reason: HOSPADM

## 2018-01-07 RX ORDER — ISOSORBIDE MONONITRATE 60 MG/1
60 TABLET, EXTENDED RELEASE ORAL
Status: DISCONTINUED | OUTPATIENT
Start: 2018-01-07 | End: 2018-01-08 | Stop reason: HOSPADM

## 2018-01-07 RX ORDER — ATORVASTATIN CALCIUM 20 MG/1
20 TABLET, FILM COATED ORAL NIGHTLY
Status: DISCONTINUED | OUTPATIENT
Start: 2018-01-07 | End: 2018-01-08 | Stop reason: HOSPADM

## 2018-01-07 RX ORDER — FUROSEMIDE 20 MG/1
20 TABLET ORAL DAILY
Status: CANCELLED | OUTPATIENT
Start: 2018-01-07

## 2018-01-07 RX ORDER — DEXTROSE MONOHYDRATE 25 G/50ML
25 INJECTION, SOLUTION INTRAVENOUS
Status: DISCONTINUED | OUTPATIENT
Start: 2018-01-07 | End: 2018-01-08 | Stop reason: HOSPADM

## 2018-01-07 RX ORDER — LITHIUM CARBONATE 300 MG/1
300 CAPSULE ORAL
Status: DISCONTINUED | OUTPATIENT
Start: 2018-01-07 | End: 2018-01-08 | Stop reason: HOSPADM

## 2018-01-07 RX ORDER — SODIUM CHLORIDE 0.9 % (FLUSH) 0.9 %
1-10 SYRINGE (ML) INJECTION AS NEEDED
Status: DISCONTINUED | OUTPATIENT
Start: 2018-01-07 | End: 2018-01-08 | Stop reason: HOSPADM

## 2018-01-07 RX ORDER — ONDANSETRON 2 MG/ML
4 INJECTION INTRAMUSCULAR; INTRAVENOUS EVERY 6 HOURS PRN
Status: DISCONTINUED | OUTPATIENT
Start: 2018-01-07 | End: 2018-01-08 | Stop reason: HOSPADM

## 2018-01-07 RX ORDER — HYDROMORPHONE HYDROCHLORIDE 1 MG/ML
0.5 INJECTION, SOLUTION INTRAMUSCULAR; INTRAVENOUS; SUBCUTANEOUS EVERY 4 HOURS PRN
Status: DISCONTINUED | OUTPATIENT
Start: 2018-01-07 | End: 2018-01-07

## 2018-01-07 RX ORDER — FUROSEMIDE 20 MG/1
20 TABLET ORAL DAILY
Status: DISCONTINUED | OUTPATIENT
Start: 2018-01-07 | End: 2018-01-08 | Stop reason: HOSPADM

## 2018-01-07 RX ORDER — MORPHINE SULFATE 2 MG/ML
2 INJECTION, SOLUTION INTRAMUSCULAR; INTRAVENOUS EVERY 4 HOURS PRN
Status: DISCONTINUED | OUTPATIENT
Start: 2018-01-07 | End: 2018-01-07

## 2018-01-07 RX ORDER — NITROGLYCERIN 0.4 MG/1
0.4 TABLET SUBLINGUAL
Status: DISCONTINUED | OUTPATIENT
Start: 2018-01-07 | End: 2018-01-08 | Stop reason: HOSPADM

## 2018-01-07 RX ORDER — LITHIUM CARBONATE 300 MG/1
300 CAPSULE ORAL
Status: CANCELLED | OUTPATIENT
Start: 2018-01-07

## 2018-01-07 RX ADMIN — ISOSORBIDE MONONITRATE 60 MG: 60 TABLET, EXTENDED RELEASE ORAL at 12:28

## 2018-01-07 RX ADMIN — PANTOPRAZOLE SODIUM 40 MG: 40 TABLET, DELAYED RELEASE ORAL at 14:00

## 2018-01-07 RX ADMIN — NITROGLYCERIN 0.4 MG: 0.4 TABLET SUBLINGUAL at 02:30

## 2018-01-07 RX ADMIN — CLOPIDOGREL 75 MG: 75 TABLET, FILM COATED ORAL at 08:55

## 2018-01-07 RX ADMIN — HYDROMORPHONE HYDROCHLORIDE 0.5 MG: 1 INJECTION, SOLUTION INTRAMUSCULAR; INTRAVENOUS; SUBCUTANEOUS at 08:29

## 2018-01-07 RX ADMIN — NITROGLYCERIN 0.4 MG: 0.4 TABLET SUBLINGUAL at 02:37

## 2018-01-07 RX ADMIN — FUROSEMIDE 20 MG: 20 TABLET ORAL at 08:53

## 2018-01-07 RX ADMIN — NITROGLYCERIN 0.4 MG: 0.4 TABLET SUBLINGUAL at 12:01

## 2018-01-07 RX ADMIN — LITHIUM CARBONATE 300 MG: 300 CAPSULE, GELATIN COATED ORAL at 12:28

## 2018-01-07 RX ADMIN — LITHIUM CARBONATE 300 MG: 300 CAPSULE, GELATIN COATED ORAL at 18:16

## 2018-01-07 RX ADMIN — HYDROMORPHONE HYDROCHLORIDE 0.5 MG: 1 INJECTION, SOLUTION INTRAMUSCULAR; INTRAVENOUS; SUBCUTANEOUS at 04:28

## 2018-01-07 RX ADMIN — NITROGLYCERIN 0.4 MG: 0.4 TABLET SUBLINGUAL at 11:53

## 2018-01-07 RX ADMIN — LITHIUM CARBONATE 300 MG: 300 CAPSULE, GELATIN COATED ORAL at 05:32

## 2018-01-07 RX ADMIN — APIXABAN 5 MG: 5 TABLET, FILM COATED ORAL at 08:53

## 2018-01-07 RX ADMIN — NITROGLYCERIN 0.4 MG: 0.4 TABLET SUBLINGUAL at 12:05

## 2018-01-07 RX ADMIN — APIXABAN 5 MG: 5 TABLET, FILM COATED ORAL at 20:23

## 2018-01-07 RX ADMIN — SERTRALINE 200 MG: 50 TABLET, FILM COATED ORAL at 08:52

## 2018-01-07 RX ADMIN — HYDROMORPHONE HYDROCHLORIDE 0.5 MG: 1 INJECTION, SOLUTION INTRAMUSCULAR; INTRAVENOUS; SUBCUTANEOUS at 12:29

## 2018-01-07 RX ADMIN — Medication 10 ML: at 12:29

## 2018-01-07 RX ADMIN — LITHIUM CARBONATE 300 MG: 300 CAPSULE, GELATIN COATED ORAL at 14:00

## 2018-01-07 RX ADMIN — PANTOPRAZOLE SODIUM 40 MG: 40 TABLET, DELAYED RELEASE ORAL at 18:16

## 2018-01-07 RX ADMIN — ONDANSETRON 4 MG: 2 INJECTION, SOLUTION INTRAMUSCULAR; INTRAVENOUS at 08:53

## 2018-01-07 RX ADMIN — LITHIUM CARBONATE 300 MG: 300 CAPSULE, GELATIN COATED ORAL at 20:23

## 2018-01-07 RX ADMIN — NITROGLYCERIN 0.4 MG: 0.4 TABLET SUBLINGUAL at 08:05

## 2018-01-07 NOTE — PLAN OF CARE
Problem: Cardiac Output, Decreased (Adult)  Goal: Identify Related Risk Factors and Signs and Symptoms  Outcome: Ongoing (interventions implemented as appropriate)

## 2018-01-07 NOTE — PLAN OF CARE
Problem: Pain, Acute (Adult)  Goal: Identify Related Risk Factors and Signs and Symptoms  Outcome: Ongoing (interventions implemented as appropriate)    Goal: Acceptable Pain Control/Comfort Level  Outcome: Ongoing (interventions implemented as appropriate)      Problem: Cardiac Output, Decreased (Adult)  Goal: Identify Related Risk Factors and Signs and Symptoms  Outcome: Ongoing (interventions implemented as appropriate)    Goal: Adequate Cardiac Output/Effective Tissue Perfusion  Outcome: Ongoing (interventions implemented as appropriate)

## 2018-01-07 NOTE — H&P
[facility] HOSPITALIST HISTORY AND PHYSICAL    Patient Identification:  Name:  Ricardo Crespo  Age:  47 y.o.  Sex:  male  :  1970  MRN:  [id number]   Visit Number:  [id number]  Primary Care Physician:  Rex Bush MD     Chief complaint:  Chest pain    History of presenting illness:  47 y.o. male who presented to [facility] emergency department with chest pain.  The patient has an extensive coronary artery disease; has a total of 5 stents, the last one being placed in his mid RCA in 2017.  Then 2015, the patient has had 6 heart catheterizations and 5 total stents.  The patient has alcoholic cirrhosis and is unable to take statins.  He was seen in Dr. Ramirez but he has an appointment with Dr. Blanco in [facility] on 2018; there is consideration for IV cholesterol medication.    The patient states that at 10:30 AM on 2018, he was trying to will a load of wood toward his house.  After 15 minutes of exercise, he had substernal tightening that radiated to his left shoulder and left jaw.  This was associated with nausea, emesis, and sweating.  This felt like his prior heart attack pain.  He rated the pain as 9 out of 10 at maximum and as low as 7.5 out of 10 at minimum.  He decided to try to lay down in the house to see if the pain would pass.  In 4 hours, the patient took 7 nitroglycerin, which did drop his blood pressure.  His wife, who is not at bedside, commented to the patient that he appeared to be gray in color.  The patient arrived at [facility] at 7 PM on 2018.  Morphine in the emergency department did not help but the allotted last about an hour.  The patient is very nervous that he is having another heart attack because he says that his arteries developed plaques quickly.  The patient denies having any anxiety induced chest pain episodes.  Patient states he does take lithium for his bipolar disease and  has had no recent changes in any of his medication.  He denies any swelling.  He denies any acute illnesses.  ---------------------------------------------------------------------------------------------------------------------   Review of Systems   Constitutional: Positive for diaphoresis. Negative for chills, fatigue and fever.   HENT: Negative for postnasal drip, rhinorrhea, sneezing and sore throat.    Eyes: Negative for discharge and redness.   Respiratory: Negative for cough, shortness of breath and wheezing.    Cardiovascular: Positive for chest pain. Negative for palpitations and leg swelling.   Gastrointestinal: Positive for nausea. Negative for diarrhea and vomiting.   Genitourinary: Negative for decreased urine volume, dysuria and hematuria.   Musculoskeletal: Negative for arthralgias and joint swelling.   Skin: Negative for pallor, rash and wound.   Neurological: Positive for facial asymmetry (Left side for 3 days). Negative for seizures, speech difficulty and headaches.   Hematological: Does not bruise/bleed easily.   Psychiatric/Behavioral: Negative for confusion, self-injury and suicidal ideas. The patient is not nervous/anxious.     ---------------------------------------------------------------------------------------------------------------------   Past Medical History:   Diagnosis Date   • Alcohol liver damage    • Anxiety    • Benign hypertension    • Bipolar depression    • Cirrhosis of liver    • Coronary artery disease    • DVT (deep venous thrombosis)     Questionable history of deep venous thrombosis.   • Dyslipidemia    • Hyperlipidemia    • Myocardial infarction     x 2   • Obesity    • JIM on CPAP     Obstructive sleep apnea, compliant with CPAP.    • Type 2 diabetes mellitus      Past Surgical History:   Procedure Laterality Date   • CARDIAC CATHETERIZATION     • CHOLECYSTECTOMY     • CORONARY ANGIOPLASTY WITH STENT PLACEMENT Left 11/09/2015    Persistent cardiac symptoms with cardiac  catheterization, Dr. Moss, with PTCA and stenting of the mid-LAD, 11/09/2015.   • GANGLION CYST EXCISION      left knee   • PYLOROMYOTOMY       Family History   Problem Relation Age of Onset   • Heart disease Father      CAD, 25 year old   • Nephrolithiasis Father    • Atrial fibrillation Mother    • No Known Problems Sister      Social History   • Marital status:      Social History Main Topics   • Smoking status: Former Smoker     Types: Cigarettes   • Smokeless tobacco: Never Used      Comment: Quit 12 years ago; smoked 17 years at 1 PPD   • Alcohol use Yes      Comment: Quit 12 years ago; drank a fifth a day for 20 years   • Drug use: No   ---------------------------------------------------------------------------------------------------------------------   Allergies:  Beta adrenergic blockers; Calcium channel blockers; and Imdur [isosorbide dinitrate]   At [facility], he stated he did not have any allergies.  He then told the ER doctor there that he was allergic to Toradol, which he did not disclose to us.  ---------------------------------------------------------------------------------------------------------------------   Prior to Admission Medications     Prescriptions Last Dose Informant Patient Reported? Taking?    clopidogrel (PLAVIX) 75 MG tablet 1/6/2018 Self Yes Yes    Take 75 mg by mouth Daily.    ELIQUIS 5 MG tablet tablet 1/6/2018 Self No Yes    Take 1 tablet by mouth Every 12 (Twelve) Hours.    furosemide (LASIX) 20 MG tablet 1/6/2018 Self Yes Yes    Take 20 mg by mouth Daily.    Insulin Glargine (BASAGLAR KWIKPEN) 100 UNIT/ML injection pen 1/6/2018 Self Yes Yes    Inject 50 Units under the skin 2 (Two) Times a Day.    lithium carbonate 300 MG capsule 1/6/2018 Self Yes Yes    Take 300 mg by mouth 5 (Five) Times a Day.    sertraline (ZOLOFT) 100 MG tablet 1/6/2018 Self Yes Yes    Take 200 mg by mouth Daily.        Hospital Scheduled Meds:  apixaban 5 mg Oral Q12H   clopidogrel 75 mg  Oral Daily   ---------------------------------------------------------------------------------------------------------------------   Vital Signs:  Temp:  [98.2 °F (36.8 °C)] 98.2 °F (36.8 °C)  Heart Rate:  [87-97] 87  Resp:  [22] 22  BP: (115-147)/() 115/61  Last 3 weights    01/07/18 0223   Weight: (!) 145 kg (320 lb 3.2 oz)     Body mass index is 43.43 kg/(m^2).  ---------------------------------------------------------------------------------------------------------------------   Physical Exam:  Constitutional:  Well-developed and well-nourished.  No respiratory distress.  Morbidly obese.    HENT:  Head: Normocephalic and atraumatic.  Mouth:  Moist mucous membranes.    Eyes:  Conjunctivae and EOM are normal.  Pupils are equal, round, and reactive to light.  No scleral icterus.  Neck:  Neck supple.  No JVD present.    Cardiovascular:  Normal rate, regular rhythm and normal heart sounds with no murmur.  Pulmonary/Chest:  No respiratory distress, no wheezes, no crackles, with normal breath sounds and good air movement.  Abdominal:  Soft.  Bowel sounds are normal.  No distension and no tenderness.  Obese abdomen   Musculoskeletal:  No edema, no tenderness, and no deformity.  No red or swollen joints anywhere.    Neurological:  Alert and oriented to person, place, and time.  No cranial nerve deficit.  No tongue deviation.  No facial droop.  No slurred speech.   Skin:  Skin is warm and dry.  No rash noted.  No pallor.   Psychiatric:  appears nervous as he is constantly moving both of his hands and his feet during my interview.  Peripheral vascular:  No edema and strong pulses on all 4 extremities.  Genitourinary:  No Kahn catheter in place  ---------------------------------------------------------------------------------------------------------------------  EKG:  I have personally looked at the EKG from our hospital; it shows normal sinus rhythm with heart rate 85 and a QTC of 480 ms with an incomplete right  bundle branch block.  I also personally looked at the EKG from [facility]; it shows normal sinus rhythm with a QTC of 406 1 ms and a heart rate of 74 with an incomplete right bundle branch block.  Telemetry:  normal sinus rhythm heart rates 80s to 90s; I have personally looked at the EKG.  ---------------------------------------------------------------------------------------------------------------------  Results from last 7 days  Lab Units 01/07/18  0250   CRP mg/dL 0.61   WBC 10*3/mm3 13.66*   HEMOGLOBIN g/dL 12.3*   HEMATOCRIT % 36.4*   MCV fL 80.9   MCHC g/dL 33.8   PLATELETS 10*3/mm3 149   INR  1.27*     Results from last 7 days  Lab Units 01/07/18  0250   SODIUM mmol/L 141   POTASSIUM mmol/L 3.5   MAGNESIUM mg/dL 2.0   CHLORIDE mmol/L 113*   CO2 mmol/L 20.2*   BUN mg/dL 10   CREATININE mg/dL 0.88   EGFR IF NONAFRICN AM mL/min/1.73 92   CALCIUM mg/dL 8.7   GLUCOSE mg/dL 165*   ALBUMIN g/dL 4.10   BILIRUBIN mg/dL 0.7   ALK PHOS U/L 81   AST (SGOT) U/L 52*   ALT (SGPT) U/L 53*   Estimated Creatinine Clearance: 150.8 mL/min (by C-G formula based on Cr of 0.88).    Results from last 7 days  Lab Units 01/07/18  0250   CK TOTAL U/L 57   TROPONIN I ng/mL <0.006   CK MB INDEX % 2.6   MYOGLOBIN ng/mL 48.0       Lab Results   Component Value Date    HGBA1C 6.90 (H) 01/07/2018     Lab Results   Component Value Date    TSH 3.712 01/07/2018         The labs from [facility] are as follows:  White blood cell count 10,800, hemoglobin 14, hematocrit 41.6, platelets 148,000  Troponin T 0.01, CPK 74, CK-MB 2.4, proBNP 42  Glucose 136, sodium 139, potassium 3.9, chloride 104, bicarbonate 21, calcium 9.2, BUN 10, creatinine 0.7, total protein 7.2, albumin 4.3, total bili 0.6, AST 36, ALT 38, alkaline phosphatase 71, lipase 32    I have personally looked at both the labs at our [facility] and at [place]  [facility].  ---------------------------------------------------------------------------------------------------------------------  Imaging Results (last 7 days)     Chest x-ray performed at [facility]; no final radiology report was sent with his ER packet.  The images disc will not open up on the computer.     ---------------------------------------------------------------------------------------------------------------------  Assessment and Plan:  -Typical chest pain/angina in a patient with history of 5 stents for coronary artery disease  -Type 2 diabetes mellitus  -Essential hypertension excellent-morbid obesity  -Obstructive sleep apnea with nighttime CPAP use and no home oxygen use  -Former light tobacco smoking addiction  -Alcoholic cirrhosis  -Bipolar disorder  -Anxiety   -Hyperlipidemia  -Report of left facial droop by his wife, who is currently not the hospital, and was not noticed by the patient and is not present on admission    The patient's been placed on the telemetry floor.  Serial cardiac enzymes will be obtained.  We will start the patient on his home Plavix and eloquent's and consult cardiology.  Cardiology thinks that it is safe for the patient to be discharged home after having 3 sets name cardiac enzymes then we may be able to let him go home with follow-up with Dr. Blanco on January 12, 2018 as previously scheduled.  I have ordered a urine drug screen to rule out illicit substances as the cause of the chest pain.  The patient seems very anxious on exam; he may benefit from a psychiatry consultation.  Because the patient is on lithium, I have ordered a lithium level.  We will obtain bedside glucose monitoring because of his type 2 diabetes.  Because the patient does have risk factors for CVAs, I will obtain a head CT without contrast.    Dipti Carlisle MD  01/07/18  3:45 AM

## 2018-01-07 NOTE — PROGRESS NOTES
Pt admitted with chest pain. Pt has h/o CAD and recent cardiac cath at [facility] which showed all stents to be patent.  He is complaining of severe chest pressure radiating to jaw and left arm.  Patient is also complaining of shortness of breath on exertion.   On examination patient was found to have few bibasilar crackles.  He's been afebrile and VS stable.  Cardiac enzymes have been negative.  CT scan of the head showed no acute intracranial abnormality.  Lithium level is normal.  Urine drug screen was positive for opiates otherwise negative. 2-D echo on 11/27/18 at [facility] showed normal LV systolic function with EF of 60%, concentric LVH with mild diastolic dysfunction and mildly dilated right ventricle.  Patient was seen by Dr. Rai and underwent 2-D echocardiogram that showedwall motion abnormality.  We'll continue him on Eliquis, Plavix and Lipitor.  Patient states that he is not able to take nitrates or beta blocker.    I will check his chest x-ray and abdominal ultrasound.  Patient has not followed up with his respiratory distress and was advised to follow-up with his gastroenterology is on discharge as well as follow-up with cardiology.  He has history of alcoholic cirrhosis but has been non-compliant with appointments.

## 2018-01-07 NOTE — CONSULTS
Referring Provider:  Dr. Carlisle    Primary care provider-Dr Jones    Reason for Consultation: -Chest pain in a patient with history of arteriosclerotic CAD status post stents-    Chief complaint chest pain      History of present illness:      47-year-old male with history of arteriosclerotic CAD status post stents ×5-details not known and prior MI has been admitted with history of chest pain.    Chest pain-present for at least one year for which he had a heart catheterization done 2 months ago at [facility] when all his stents were patent and he had 50-60% stenosis in the LAD and decided to treat medically per patient came to emergency department with history of chest pain with increasing in intensity and frequency.  His chest pain is retrosternal, pressure-like, sometimes exertional, with relief partially with sublingual nitroglycerin, with no radiation and not associated with diaphoresis.  Patient does not take long-acting nitrates.  He is not on beta blocker or calcium blocker because of history of allergy to these medications.  ECG showed normal sinus rhythm, incomplete RBBB and left anterior fascicular block.  No evidence of ischemia.  Cardiac markers ×2-negative.    Dyspnea on exertion is his other main complaint which is also present for a long time and has been progressive and now just a symptom on doing activity of daily life.  No history of PND or orthopnea.  No history of leg edema.  No history of palpitation, dizziness or syncope.    His cardiovascular history is is remarkable for history of arteriosclerotic CAD status post stents ×5 in the past 2 years, the last one was done about 6 months ago, cardiac catheterization done 2 months ago showed that all his stents were patent and he had LAD 50-60% stenosis and decided to treat medically-according to the patient, paroxysmal atrial fibrillation and anticoagulated on Eliquis.    Cardiovascular risk factors include previous  history of smoking, hypertension, hyperlipidemia and DM type II.  He has history of alcohol related cirrhosis and ascites.  He quit alcohol abuse.      Review of Systems     Constitutional-fatigue and generalized weakness  ENT-none  Cardiovascular-as above  Respiratory-shortness of breath.  His sleep apnea syndrome  GI-history of cirrhosis of the liver and stomach problem  -diabetes and lipid disorder  Musculoskeletal-pain syndrome  Psychiatric-bipolar disorder and depression  Review of father organ system involvement-negative      History  Past Medical History:   Diagnosis Date   • Alcohol liver damage    • Anxiety    • Benign hypertension    • Bipolar depression    • Cirrhosis of liver    • Coronary artery disease    • DVT (deep venous thrombosis)     Questionable history of deep venous thrombosis.   • Dyslipidemia    • Hyperlipidemia    • Myocardial infarction     x 2   • Obesity    • JIM on CPAP     Obstructive sleep apnea, compliant with CPAP.    • Type 2 diabetes mellitus    , Past Surgical History:   Procedure Laterality Date   • CARDIAC CATHETERIZATION     • CHOLECYSTECTOMY     • CORONARY ANGIOPLASTY WITH STENT PLACEMENT Left 11/09/2015    Persistent cardiac symptoms with cardiac catheterization, Dr. Moss, with PTCA and stenting of the mid-LAD, 11/09/2015.   • GANGLION CYST EXCISION      left knee   • PYLOROMYOTOMY     , Family History   Problem Relation Age of Onset   • Heart disease Father      CAD, 25 year old   • Nephrolithiasis Father    • Atrial fibrillation Mother    • No Known Problems Sister    , Social History   Substance Use Topics   • Smoking status: Former Smoker     Types: Cigarettes   • Smokeless tobacco: Never Used      Comment: Quit 12 years ago; smoked 17 years at 1 PPD   • Alcohol use Yes      Comment: Quit 12 years ago; drank a fifth a day for 20 years   ,     Medication Review:      apixaban 5 mg Oral Q12H   atorvastatin 20 mg Oral Nightly   clopidogrel 75 mg Oral Daily   furosemide  "20 mg Oral Daily   isosorbide mononitrate 60 mg Oral Q24H   lithium carbonate 300 mg Oral 5x Daily   sertraline 200 mg Oral Daily        Allergies:  Beta adrenergic blockers; Calcium channel blockers; and Imdur [isosorbide dinitrate]    Objective     Vital Sign Min/Max for last 24 hours  Temp  Min: 97.4 °F (36.3 °C)  Max: 98.5 °F (36.9 °C)   BP  Min: 115/61  Max: 147/102   Pulse  Min: 80  Max: 97   Resp  Min: 20  Max: 22   SpO2  Min: 93 %  Max: 97 %   Flow (L/min)  Min: 2  Max: 2   Weight  Min: 145 kg (320 lb 3.2 oz)  Max: 145 kg (320 lb 3.2 oz)     Flowsheet Rows         First Filed Value    Admission Height  182.9 cm (72\") Documented at 01/07/2018 0223    Admission Weight  (!)  145 kg (320 lb 3.2 oz) Documented at 01/07/2018 0223             Physical Exam:     General Appearance:    Alert, cooperative, in no acute distress   Head:    Normocephalic, without obvious abnormality, atraumatic   Eyes:            Lids and lashes normal, conjunctivae and sclerae normal, no   icterus, no pallor, corneas clear, PERRLA   Ears:    Ears appear intact with no abnormalities noted   Throat:   No oral lesions, no thrush, oral mucosa moist   Neck:   No adenopathy, supple, trachea midline, no thyromegaly, no   carotid bruit, no JVD   Back:     No kyphosis present, no scoliosis present, no skin lesions,      erythema or scars, no tenderness to percussion or                   palpation,   range of motion normal   Lungs:     Clear to auscultation,respirations regular, even and                  unlabored    Heart:    Regular rhythm and normal rate, normal S1 and S2, no            murmur, no gallop, no rub, no click   Chest Wall:    No abnormalities observed   Abdomen:     Distended.  No  tenderness.  No organomegaly.  Possible free fluid.  Bowel sounds are heard.     Rectal:     Deferred   Extremities:   Moves all extremities well, no edema, no cyanosis, no             redness   Pulses:   Pulses palpable and equal bilaterally   Skin:   " No bleeding, bruising or rash   Lymph nodes:   No palpable adenopathy   Neurologic:   Cranial nerves 2 - 12 grossly intact, sensation intact, DTR       present and equal bilaterally       Telemetry  Normal sinus rhythm    ECG  ECG/EMG Results (last 24 hours)     Procedure Component Value Units Date/Time    ECG 12 Lead [[id number]] Collected:  01/07/18 0228     Updated:  01/07/18 0556    Narrative:       Test Reason : Chest Pain  Blood Pressure : **/** mmHG  Vent. Rate : 085 BPM     Atrial Rate : 085 BPM     P-R Int : 160 ms          QRS Dur : 104 ms      QT Int : 404 ms       P-R-T Axes : 040 -34 -12 degrees     QTc Int : 480 ms    Normal sinus rhythm  Left axis deviation  Incomplete right bundle branch block  Prolonged QT  Abnormal ECG  No previous ECGs available    Referred By:             Confirmed By:     ECG 12 Lead [[id number]] Collected:  01/07/18 0827     Updated:  01/07/18 0828    Narrative:       Test Reason : cvhest pain  Blood Pressure : **/** mmHG  Vent. Rate : 080 BPM     Atrial Rate : 080 BPM     P-R Int : 166 ms          QRS Dur : 108 ms      QT Int : 424 ms       P-R-T Axes : 050 -49 022 degrees     QTc Int : 489 ms    Normal sinus rhythm  Incomplete right bundle branch block  Left anterior fascicular block  Prolonged QT  Abnormal ECG  When compared with ECG of 07-JAN-2018 02:28, (Unconfirmed)  Nonspecific T wave abnormality has replaced inverted T waves in Inferior  leads    Referred By:  MIGUEL A           Confirmed By:             Labs    Results from last 7 days  Lab Units 01/07/18  0250   WBC 10*3/mm3 13.66*   HEMOGLOBIN g/dL 12.3*   HEMATOCRIT % 36.4*   PLATELETS 10*3/mm3 149       Results from last 7 days  Lab Units 01/07/18  0250   SODIUM mmol/L 141   POTASSIUM mmol/L 3.5   CHLORIDE mmol/L 113*   CO2 mmol/L 20.2*   BUN mg/dL 10   CREATININE mg/dL 0.88   CALCIUM mg/dL 8.7   GLUCOSE mg/dL 165*       Results from last 7 days  Lab Units 01/07/18  0250   BILIRUBIN mg/dL 0.7   ALK PHOS U/L 81   AST  (SGOT) U/L 52*   ALT (SGPT) U/L 53*       Results from last 7 days  Lab Units 01/07/18  0250   MAGNESIUM mg/dL 2.0       Results from last 7 days  Lab Units 01/07/18  0250   INR  1.27*       Results from last 7 days  Lab Units 01/07/18  0250   CRP mg/dL 0.61       Results from last 7 days  Lab Units 01/07/18  0832 01/07/18  0250   CK TOTAL U/L 60 57   TROPONIN I ng/mL <0.006 <0.006   CK MB INDEX %  --  2.6   MYOGLOBIN ng/mL 46.0 48.0             Imaging  Imaging Results (last 24 hours)     Procedure Component Value Units Date/Time    CT Head Without Contrast [[id number]] Collected:  01/07/18 0737     Updated:  01/07/18 0740    Narrative:       CT HEAD WO CONTRAST-     CLINICAL INDICATION: left facial weakness          COMPARISON: none available      TECHNIQUE: Axial images of the brain were obtained with out intravenous  contrast.  Reformatted images were created in the sagittal and coronal  planes.     DOSE: 1225 mGy.cm     Radiation dose reduction techniques were utilized per ALARA protocol.  Automated exposure control was initiated through either or CareDose or  DoseRight software packages by  protocol.           FINDINGS:   Today's study shows no mass, hemorrhage, or midline shift.   The ventricles, cisterns, and sulci are unremarkable. There is no  hydrocephalus.   There is no evidence of acute ischemia.  I do not see epidural or subdural hematoma.  The gray-white differentiation is appropriate.   The bone window setting images show no destructive calvarial lesion or  acute calvarial fracture.   The posterior fossa is unremarkable.             Impression:       No acute intracranial pathology. Nothing is seen on this exam to  specifically account for the patient's symptoms.     This report was finalized on 1/7/2018 7:38 AM by Dr. Satya Adhikari MD.               Assessment     1.  Chest pain-recurrent.  Angina-[place] cardiovascular Society class II.  No MI    2.  Arteriosclerotic CAD      - Status  post stents ×5-details not known    -  Prior MI     - Cardiac catheterization done 2 months ago showed-all stents patent and LAD-50-60% stenosis.  Official report-not available.    3.  Multiple cardiac risk factors-DM type II, hypertension, hyperlipidemia and previous history of smoking    4.  Paroxysmal atrial fibrillation.  In sinus rhythm.    5.  Anticoagulated on Eliquis.    6.  Alcoholic cirrhosis    7.  Obstructive sleep apnea syndrome        Plan    1.  Per patient, recent coronary arteriogram showed that all stents were patent done no significant obstructive CAD, will treat angina with optimization of medical therapy and aggressive cardiac risk factors modifications.    Started isosorbide mononitrate 60 mg by mouth daily and continue Plavix.  Started Lipitor 20 mg by mouth daily.  Records suggest that patient is allergic to beta blockers and calcium blockers.  Unable to start Ranexa due to liver disease.     Ill ill obtain coronary arteriograms report done 2 months ago at [facility] to know the exact findings    2.  Ordered echocardiogram to evaluate left ventricular function and wall motion    3.  I discussed my recommendations with the patient and patient's wife.      Thanks Dr. Carlisle for the consult and for involving me in the care of your patient.        Addendum-    I reviewed the coronary arteriogram done 11/2017 at [facility].  It showed patent stent in LAD, 50% stenosis in D1, 10% stenosis in in LCx and 10% stenosis in RCA.  LVEF was normal.    Patient does not have significant coronary artery disease to cause angina.  His chest pain is noncardiac and suggest workup for noncardiac cause of the symptoms.  Patient is allergic to long-acting nitrates, beta blocker and calcium channel blocker and cannot give Ranexa due to liver disease.  Suggest follow-up with his cardiologist in next 3-4 weeks.  I discussed my recommendations with patient's  wife.    Uche Rai MD  01/07/18  9:37 AM

## 2018-01-08 PROCEDURE — G0378 HOSPITAL OBSERVATION PER HR: HCPCS

## 2018-01-08 NOTE — NURSING NOTE
Accompanied Dr. Carlisle to the room of patient. Patient wanted to talk to Dr. Carlisle about his pain management.

## 2018-01-08 NOTE — NURSING NOTE
Pt called out asking if he had any pain medications due at 2250. I received in report that Dr. Beth had discontinued all pain medications r/t pt's low liver fx. I explained this to the pt and he then asked if there was a physician on duty he could talk to regarding getting his pain under control. I informed the pt that we do have a hospitalist on duty and that I could page her and see if she had time to see him. I explained to him that before I paged her that I would need to obtain an EKG, vitals, and cardiac enzymes so that I could relay the results to her in an effort for her to proceed with his plan of care. He acknowledged the need for the diagnostics and was compliant.

## 2018-01-08 NOTE — NURSING NOTE
"Accompanied LILIANE Bain to room and went over AMA papers with patient and told him the risk of leaving against medical advice even the risk of lose of life if he leaves.  Patient states \"he understands and signs papers\".    "

## 2018-01-08 NOTE — NURSING NOTE
"Was told that patient wanted to leave AMA by nurse went in and spoke with patient who states \"I cant stay in this room with this patient\"  So talk to patient about staying and moving to a new room that was private and he states he will stay if he is moved by himself.  Patient was moved at this time to a private room 324.  "

## 2018-01-09 LAB — BACTERIA SPEC AEROBE CULT: NO GROWTH

## 2018-01-12 ENCOUNTER — OFFICE VISIT (OUTPATIENT)
Dept: CARDIOLOGY | Facility: CLINIC | Age: 48
End: 2018-01-12

## 2018-01-12 VITALS
HEIGHT: 72 IN | SYSTOLIC BLOOD PRESSURE: 129 MMHG | WEIGHT: 315 LBS | DIASTOLIC BLOOD PRESSURE: 82 MMHG | BODY MASS INDEX: 42.66 KG/M2 | HEART RATE: 74 BPM

## 2018-01-12 DIAGNOSIS — E78.5 HYPERLIPIDEMIA LDL GOAL <70: ICD-10-CM

## 2018-01-12 DIAGNOSIS — I48.0 PAROXYSMAL ATRIAL FIBRILLATION (HCC): ICD-10-CM

## 2018-01-12 DIAGNOSIS — I50.32 CHRONIC DIASTOLIC CONGESTIVE HEART FAILURE (HCC): ICD-10-CM

## 2018-01-12 DIAGNOSIS — I10 ESSENTIAL HYPERTENSION: ICD-10-CM

## 2018-01-12 DIAGNOSIS — I25.119 CORONARY ARTERY DISEASE INVOLVING NATIVE CORONARY ARTERY OF NATIVE HEART WITH ANGINA PECTORIS (HCC): Primary | ICD-10-CM

## 2018-01-12 PROBLEM — K74.60 CIRRHOSIS (HCC): Status: ACTIVE | Noted: 2018-01-12

## 2018-01-12 PROBLEM — K74.60 CIRRHOSIS: Status: RESOLVED | Noted: 2018-01-12 | Resolved: 2018-01-12

## 2018-01-12 PROBLEM — R18.8 ASCITES: Status: ACTIVE | Noted: 2018-01-12

## 2018-01-12 PROBLEM — R06.02 SHORTNESS OF BREATH: Status: RESOLVED | Noted: 2017-02-06 | Resolved: 2018-01-12

## 2018-01-12 PROBLEM — R00.2 PALPITATIONS: Status: RESOLVED | Noted: 2017-02-06 | Resolved: 2018-01-12

## 2018-01-12 PROBLEM — R07.9 CHEST PAIN: Status: RESOLVED | Noted: 2017-10-25 | Resolved: 2018-01-12

## 2018-01-12 PROCEDURE — 99244 OFF/OP CNSLTJ NEW/EST MOD 40: CPT | Performed by: INTERNAL MEDICINE

## 2018-01-12 PROCEDURE — 93000 ELECTROCARDIOGRAM COMPLETE: CPT | Performed by: INTERNAL MEDICINE

## 2018-01-12 RX ORDER — APIXABAN 5 MG/1
5 TABLET, FILM COATED ORAL EVERY 12 HOURS SCHEDULED
Qty: 180 TABLET | Refills: 1
Start: 2018-01-12 | End: 2018-02-26 | Stop reason: ALTCHOICE

## 2018-01-12 RX ORDER — CLOPIDOGREL BISULFATE 75 MG/1
75 TABLET ORAL DAILY
Qty: 90 TABLET | Refills: 1 | Status: SHIPPED | OUTPATIENT
Start: 2018-01-12 | End: 2018-02-26 | Stop reason: ALTCHOICE

## 2018-01-12 RX ORDER — FUROSEMIDE 40 MG/1
40 TABLET ORAL DAILY
Qty: 90 TABLET | Refills: 1 | Status: SHIPPED | OUTPATIENT
Start: 2018-01-12 | End: 2018-07-11

## 2018-01-12 RX ORDER — SPIRONOLACTONE 50 MG/1
50 TABLET, FILM COATED ORAL DAILY
Qty: 90 TABLET | Refills: 1 | Status: SHIPPED | OUTPATIENT
Start: 2018-01-12 | End: 2018-07-11

## 2018-01-12 NOTE — ASSESSMENT & PLAN NOTE
Statin therapy may be complicated given his underlying cirrhosis. Recent LFT testing suggested modest transaminitis and therefore I do think it may be a reasonable option to attempt low-dose atorvastatin therapy. The patient would like to think about this and rediscuss at his next visit.

## 2018-01-12 NOTE — PROGRESS NOTES
Encounter Date:01/12/2018    Patient ID: Ricardo Crespo is a  47 y.o. male who resides in [place], [place].    CC/Reason for visit:  Chest Pain (Overdue follow up); Coronary Artery Disease; and Hypertension            Ricardo Crespo presents to my clinic for a second opinion regarding recurrent and medically refractory chest pain. The patient is a 47-year-old gentleman who underwent PCI of his LAD in 2015 for chest pain symptoms. He states that after his PCI, his chest pain resolved for approximately 2 months and then recurred. He states that the chest discomfort is associated with shortness of breath and occurs with moderate activities such as lifting concrete block. Since the above-mentioned PCI, the patient is undergone multiple cardiac catheterizations for recurrent chest pain which showed no obstructive disease. In February of last year, the patient was evaluated by Dr. Michael Thakkar and underwent nuclear stress testing which suggested inferior ischemia and transient ischemic dilatation of the left ventricle concerning for multivessel disease. The patient then underwent cardiac catheterization in September with Dr. Ramirez and reportedly had a high-grade stenosis of the distal LAD which was treated with PCI. The patient re-presented several months later to [facility] which revealed widely patent stents and otherwise essentially normal coronary arteries with the exception of a moderate ostial diagonal branch stenosis. The patient reports having alcoholic cirrhosis and has been evaluated by Dr. Vazquez for this. Evidently, up titration of diuretics was recommended but the patient for reasons unclear did not do this. He was also recommended to undergo biopsy of his liver but also declined this. He presently is scheduled to see Dr. Mcduffie for what sounds to be splenomegaly, leukocytosis, and mild anemia per the patient.    Review of Systems   Constitution: Positive for  diaphoresis, weakness and malaise/fatigue.   HENT: Positive for tinnitus.    Eyes: Negative for vision loss in left eye and vision loss in right eye.   Cardiovascular: Positive for chest pain, dyspnea on exertion, irregular heartbeat, leg swelling and palpitations. Negative for near-syncope, orthopnea, paroxysmal nocturnal dyspnea and syncope.   Respiratory: Positive for shortness of breath, sleep disturbances due to breathing and wheezing.    Hematologic/Lymphatic: Bruises/bleeds easily.   Musculoskeletal: Negative for myalgias.   Gastrointestinal: Positive for bloating, abdominal pain and diarrhea.   Neurological: Positive for difficulty with concentration, dizziness, numbness and tremors. Negative for brief paralysis, excessive daytime sleepiness, focal weakness and paresthesias.   Psychiatric/Behavioral: Positive for altered mental status. The patient is nervous/anxious.    All other systems reviewed and are negative.      The patient's past medical, social, family history and ROS reviewed in the patient's electronic medical record.    Allergies  Beta adrenergic blockers; Calcium channel blockers; and Imdur [isosorbide dinitrate]    Outpatient Prescriptions Marked as Taking for the 1/12/18 encounter (Office Visit) with Rashard Blanco IV, MD   Medication Sig Dispense Refill   • clopidogrel (PLAVIX) 75 MG tablet Take 1 tablet by mouth Daily for 180 days. 90 tablet 1   • ELIQUIS 5 MG tablet tablet Take 1 tablet by mouth Every 12 (Twelve) Hours for 180 days. 180 tablet 1   • furosemide (LASIX) 40 MG tablet Take 1 tablet by mouth Daily for 180 days. 90 tablet 1   • Insulin Glargine (BASAGLAR KWIKPEN) 100 UNIT/ML injection pen Inject 50 Units under the skin 2 (Two) Times a Day.  1   • lithium carbonate 300 MG capsule Take 300 mg by mouth 5 (Five) Times a Day.  2   • sertraline (ZOLOFT) 100 MG tablet Take 200 mg by mouth Daily.  2   • [DISCONTINUED] clopidogrel (PLAVIX) 75 MG tablet Take 75 mg by mouth Daily.  " 11   • [DISCONTINUED] ELIQUIS 5 MG tablet tablet Take 1 tablet by mouth Every 12 (Twelve) Hours. 60 tablet 6   • [DISCONTINUED] furosemide (LASIX) 20 MG tablet Take 20 mg by mouth Daily.  1         Blood pressure 129/82, pulse 74, height 182.9 cm (72\"), weight (!) 147 kg (325 lb).  Body mass index is 44.08 kg/(m^2).    Physical Exam   Constitutional: He is oriented to person, place, and time. He appears well-developed and well-nourished.   HENT:   Head: Normocephalic and atraumatic.   Eyes: Pupils are equal, round, and reactive to light. No scleral icterus.   Neck: No JVD present. Carotid bruit is not present. No thyromegaly present.   Cardiovascular: Normal rate, regular rhythm, S1 normal and S2 normal.  Exam reveals no gallop.    No murmur heard.  Pulmonary/Chest: Effort normal and breath sounds normal.   Abdominal: Soft. There is no hepatosplenomegaly. There is no tenderness.   Neurological: He is alert and oriented to person, place, and time.   Skin: Skin is warm and dry. No cyanosis. Nails show no clubbing.   Psychiatric: He has a normal mood and affect. His behavior is normal.       Data Review:     ECG 12 Lead  Date/Time: 1/12/2018 1:32 PM  Performed by: MONET GUEVARA IV  Authorized by: MONET GUEVARA IV   Rhythm: sinus rhythm  BPM: 74  Clinical impression: non-specific ECG  Comments: Nonspecific ST and T wave changes               Problem List Items Addressed This Visit        Cardiovascular and Mediastinum    Coronary artery disease involving native coronary artery of native heart with angina pectoris - Primary    Overview     · Cardiac catheterization by Dr. Moss (11/9/2015):  PCI of mid-LAD  · Repeat cardiac catheterization for persistent chest pain symptoms (11/23/2015): Patent LAD stent.  Moderate PDA disease.  Normal LVEF.  Medical therapy recommended  · Cardiac catheterization for recurrent chest pain by Timo Louis (1/2016):  No obstructive disease. Normal " LVEF.  · Echocardiogram (2/23/2017): EF 50-55%.  · Pharmacologic nuclear stress (2/23/17): Inferior ischemia. TID present  · Cardiac catheterization by Michael Ramirez (9/17/17): PCI of the apical LAD. Mid RCA 70% stenosis. Normal LVEF.  · Cardiac catheterization for recurrent angina by Dr. Bee (11/20/17): Essentially normal coronary arteries with the exception of moderate disease of the first diagonal ostium. Patent LAD stent.         Relevant Medications    clopidogrel (PLAVIX) 75 MG tablet    Hyperlipidemia LDL goal <70    Overview     · High intensity statin therapy indicated given the presence coronary disease         Chronic diastolic congestive heart failure    Current Assessment & Plan     · Echo (1/7/18): LVEF 62%. No significant valvular abnormality.         Relevant Medications    furosemide (LASIX) 40 MG tablet    spironolactone (ALDACTONE) 50 MG tablet    clopidogrel (PLAVIX) 75 MG tablet    Other Relevant Orders    BNP    Basic Metabolic Panel    Essential hypertension    Relevant Medications    furosemide (LASIX) 40 MG tablet    spironolactone (ALDACTONE) 50 MG tablet    Paroxysmal atrial fibrillation    Overview     · Diagnosed at [facility], 2017  · [id number] = 2          Relevant Medications    ELIQUIS 5 MG tablet tablet    clopidogrel (PLAVIX) 75 MG tablet        The patient has functional class III angina/heart failure symptoms with multiple cardiac catheterizations performed over the last several years showing what sounds to be rather acceptable coronary vasculature. I do not have the most recent films available for review but are requesting these. However, my gestalt regarding this matter is that his chest pain is not related to significant epicardial coronary artery disease.  Microvascular disease, chronic diastolic heart failure with volume overload, or noncardiac etiologies such as abdominal ascites all seem to me to be more likely a culprit.       · Obtain cardiac  catheterization films from [facility] from September 2017 and November 2017.  · Increase furosemide to 40 mg daily  · Start spironolactone 50 mg daily  · BMP and BNP in 2 weeks  · Return in about 6 weeks (around 2/23/2018).      Rashard Blanco IV, MD  1/12/2018     Scribed for Rashard Blanco IV, MD by Kody Carver. 1/12/2018  3:16 PM

## 2018-02-26 ENCOUNTER — OFFICE VISIT (OUTPATIENT)
Dept: CARDIOLOGY | Facility: CLINIC | Age: 48
End: 2018-02-26

## 2018-02-26 ENCOUNTER — TELEPHONE (OUTPATIENT)
Dept: CARDIOLOGY | Facility: CLINIC | Age: 48
End: 2018-02-26

## 2018-02-26 VITALS
SYSTOLIC BLOOD PRESSURE: 129 MMHG | HEART RATE: 84 BPM | DIASTOLIC BLOOD PRESSURE: 82 MMHG | HEIGHT: 72 IN | WEIGHT: 315 LBS | OXYGEN SATURATION: 96 % | BODY MASS INDEX: 42.66 KG/M2

## 2018-02-26 DIAGNOSIS — R07.2 PRECORDIAL PAIN: ICD-10-CM

## 2018-02-26 DIAGNOSIS — I25.119 CORONARY ARTERY DISEASE INVOLVING NATIVE CORONARY ARTERY OF NATIVE HEART WITH ANGINA PECTORIS (HCC): Primary | ICD-10-CM

## 2018-02-26 DIAGNOSIS — R06.02 SHORTNESS OF BREATH: ICD-10-CM

## 2018-02-26 PROCEDURE — 99213 OFFICE O/P EST LOW 20 MIN: CPT | Performed by: PHYSICIAN ASSISTANT

## 2018-02-26 RX ORDER — NITROGLYCERIN 0.4 MG/1
0.4 TABLET SUBLINGUAL
Refills: 2 | COMMUNITY
Start: 2018-02-22 | End: 2019-10-28 | Stop reason: SDUPTHER

## 2018-02-26 NOTE — TELEPHONE ENCOUNTER
Cath report, office note given to Dr. Ramirez and asked to review.       ----- Message from Taya Gonzalez LPN sent at 2/26/2018 10:43 AM EST -----      ----- Message -----     From: CARINA Cifuentes     Sent: 2/26/2018   9:33 AM       To: Lb Ramirez [facility],   Please have Dr. Ramirez review last cath films to see if he thinks the patient is a candidate for PCI.  Please let me know.  Thank you.

## 2018-02-26 NOTE — PROGRESS NOTES
Problem list     Subjective   Ricardo Crespo is a 47 y.o. male     Chief Complaint   Patient presents with   • Follow-up     patient appears in office today for follow up    • Hypertension   • Palpitations   • Shortness of Breath   • Chest Pain   Problem List:  1. Coronary artery disease  1.1. Stenting of the LAD, 11/2015, per Dr. Moss.  1.2. Repeat catheterization 2-3 weeks after stenting as above, indicating patent stent with 40% PDA stenosis. Medical management was recommended.  1.3. Repeat catheterization, 01/2016, indicating nonobstructive disease with patent stent. Medical management was recommended.  1.4.  Stenting of the PDA, 03/2017, in the setting of low-level symptoms and abnormal stress test.  Previous stent to the LAD was patent.  The patient had nonobstructive disease otherwise.  Medical management was recommended.  1.5.  Stenting of the mid RCA and PTCA only of the distal LAD, 09/17.  1.6.  Repeat LHC, 11/17, in the setting of acute chest pain.  The patient had 50% LAD stenosis, patent stents, and non-obstructive CAD otherwise.  2. Preserved systolic function  3. Hypertension  4. Dyslipidemia  5. Diabetes mellitus  6. Sleep apnea  7. Reported history of DVT.    HPI  The patient present back for routine evaluation.  He recently was seen and admitted in the setting of chest pain.  Cardiac enzymes and EKGs otherwise were unremarkable.  Catheter films are reviewed at that time.  The patient was felt to have nonobstructive disease.  He was discharged for follow-up today.  He has been started on spironolactone and feels some improvement with that with regards to his shortness of air and his edema.  He still has chest tightness with exertion.  This occurs mostly with low level activities.  He has referral to left upper extremity and more importantly to the left jaw.  This was his anginal equivalent symptom in the past.  He does take nitroglycerin and his symptoms improve.  The patient has no dysrhythmic  or failure symptoms at this time.  He has no further complaints otherwise.    Current Outpatient Prescriptions   Medication Sig Dispense Refill   • furosemide (LASIX) 40 MG tablet Take 1 tablet by mouth Daily for 180 days. 90 tablet 1   • Insulin Glargine (BASAGLAR KWIKPEN) 100 UNIT/ML injection pen Inject 50 Units under the skin 2 (Two) Times a Day.  1   • lithium carbonate 300 MG capsule Take 300 mg by mouth 5 (Five) Times a Day.  2   • nitroglycerin (NITROSTAT) 0.4 MG SL tablet Place 0.4 mg under the tongue Every 5 (Five) Minutes As Needed for Chest Pain.  2   • sertraline (ZOLOFT) 100 MG tablet Take 200 mg by mouth Daily.  2   • spironolactone (ALDACTONE) 50 MG tablet Take 1 tablet by mouth Daily for 180 days. 90 tablet 1     No current facility-administered medications for this visit.        Beta adrenergic blockers; Calcium channel blockers; and Imdur [isosorbide dinitrate]    Past Medical History:   Diagnosis Date   • Abnormal ECG    • Alcohol liver damage    • Anxiety    • Arrhythmia    • Atrial fibrillation    • Benign hypertension    • Bipolar depression    • CHF (congestive heart failure)    • Cirrhosis of liver    • Clotting disorder    • Coronary artery disease    • DVT (deep venous thrombosis)     Questionable history of deep venous thrombosis.   • Dyslipidemia    • Hyperlipidemia    • Myocardial infarction     x 2   • Obesity    • JIM on CPAP     Obstructive sleep apnea, compliant with CPAP.    • Type 2 diabetes mellitus    • Valvular disease        Social History     Social History   • Marital status:      Spouse name: N/A   • Number of children: N/A   • Years of education: N/A     Occupational History   • Not on file.     Social History Main Topics   • Smoking status: Former Smoker     Packs/day: 1.00     Years: 15.00     Types: Cigarettes     Start date: 8/1/1985     Quit date: 2008   • Smokeless tobacco: Never Used      Comment: Quit 12 years ago; smoked 17 years at 1 PPD   • Alcohol use No       Comment: Quit 12 years ago; drank a fifth a day for 20 years   • Drug use: No   • Sexual activity: Yes     Partners: Female     Birth control/ protection: None     Other Topics Concern   • Not on file     Social History Narrative       Family History   Problem Relation Age of Onset   • Heart disease Father      CAD, 25 year old   • Nephrolithiasis Father    • Heart attack Father    • Atrial fibrillation Mother    • Arrhythmia Mother    • No Known Problems Sister        Review of Systems   Constitutional: Positive for fatigue (increase in fatigue).   HENT: Negative for congestion, rhinorrhea and sneezing.    Eyes: Positive for visual disturbance (wears reading glasses).   Respiratory: Positive for apnea (JIM with Bi-pap use), chest tightness (chest tightness with exertion ), shortness of breath (easily SOA ; worse on exertion ) and wheezing (with exertion). Negative for cough.    Cardiovascular: Positive for chest pain (midsternal CP), palpitations (frequent palpitations) and leg swelling (BLE swelling/edema).   Gastrointestinal: Negative.  Negative for abdominal distention, abdominal pain, nausea and vomiting.   Endocrine: Negative.  Negative for cold intolerance, heat intolerance, polyphagia and polyuria.   Genitourinary: Negative.  Negative for difficulty urinating, frequency and urgency.   Musculoskeletal: Positive for arthralgias (back) and back pain (due to arthritis). Negative for myalgias, neck pain and neck stiffness.   Skin: Negative.  Negative for rash and wound.   Allergic/Immunologic: Negative.  Negative for environmental allergies and food allergies.   Neurological: Positive for light-headedness (x1 episode) and headaches (with HTN). Negative for dizziness, syncope and weakness.   Psychiatric/Behavioral: Positive for agitation (easily agitated). Negative for confusion and sleep disturbance (denies waking up smothering/SOA). The patient is nervous/anxious (easily nervous/anxious).        Objective  "  Vitals:    02/26/18 0923   BP: 129/82   BP Location: Left arm   Patient Position: Sitting   Pulse: 84   SpO2: 96%   Weight: (!) 146 kg (322 lb)   Height: 182.9 cm (72\")      /82 (BP Location: Left arm, Patient Position: Sitting)  Pulse 84  Ht 182.9 cm (72\")  Wt (!) 146 kg (322 lb)  SpO2 96%  BMI 43.67 kg/m2   Lab Results (most recent)     None        Physical Exam   Constitutional: He is oriented to person, place, and time. He appears well-developed and well-nourished. No distress.   HENT:   Head: Normocephalic and atraumatic.   Eyes: Conjunctivae and EOM are normal. Pupils are equal, round, and reactive to light.   Neck: Normal range of motion. Neck supple. No JVD present. No tracheal deviation present.   Cardiovascular: Normal rate, regular rhythm, normal heart sounds and intact distal pulses.    Pulmonary/Chest: Effort normal and breath sounds normal.   Abdominal: Soft. Bowel sounds are normal. He exhibits no distension and no mass. There is no tenderness. There is no rebound and no guarding.   Musculoskeletal: Normal range of motion. He exhibits no edema, tenderness or deformity.   Neurological: He is alert and oriented to person, place, and time.   Skin: Skin is warm and dry. No rash noted. No erythema. No pallor.   Psychiatric: He has a normal mood and affect. His behavior is normal. Judgment and thought content normal.   Nursing note and vitals reviewed.        Procedure   Procedures       Assessment/Plan      Diagnosis Plan   1. Coronary artery disease involving native coronary artery of native heart with angina pectoris     2. Precordial pain     3. Shortness of breath       I will have Dr. Ramirez review cath films to see if he thinks the area of LAD/diagonal disease could be approached.  The patient is intolerant to calcium channel blocker, beta blocker therapy, and long-acting nitrate therapy.  Ranexa has not been an option for him in the past.  I will continue medications for now.  We will " let him know after catheter films are reviewed and proceed further at that time.                 Electronically signed by:

## 2018-03-07 ENCOUNTER — TELEPHONE (OUTPATIENT)
Dept: CARDIOLOGY | Facility: CLINIC | Age: 48
End: 2018-03-07

## 2018-03-07 DIAGNOSIS — I25.118 CORONARY ARTERY DISEASE OF NATIVE ARTERY OF NATIVE HEART WITH STABLE ANGINA PECTORIS (HCC): ICD-10-CM

## 2018-03-07 DIAGNOSIS — I20.8 ANGINA AT REST (HCC): Primary | ICD-10-CM

## 2018-03-07 DIAGNOSIS — I10 ESSENTIAL HYPERTENSION: ICD-10-CM

## 2018-03-07 NOTE — TELEPHONE ENCOUNTER
Per verbal order Grzegorz Daugherty PA-C this patient is to be scheduled for [facility]. -NESS;LATOYA    Patient has been notified and verbalized understanding. Patient has been scheduled for 03/26/2018 @ 0200 PM. Patient was notified to be at [facility] @ 1200 PM. Take all current medications, pt states he is not allergic to IV dye, he is not taking Metformin/Glucophage. Per verbal order Grzegorz Daugherty PA-C patient needs to be started on Plavix 75 mg daily and ASA 81 mg. Patient cannot be on a statin due to cirrhosis of liver. All medications will be sent into pharmacy.     Patient has been notified of all verbal orders, times and dates and had no further questions at this time. -NESS;LATOYA

## 2018-03-12 DIAGNOSIS — I48.0 PAROXYSMAL ATRIAL FIBRILLATION (HCC): ICD-10-CM

## 2018-03-12 DIAGNOSIS — I10 ESSENTIAL HYPERTENSION: ICD-10-CM

## 2018-03-12 DIAGNOSIS — I25.118 CORONARY ARTERY DISEASE OF NATIVE ARTERY OF NATIVE HEART WITH STABLE ANGINA PECTORIS (HCC): Primary | ICD-10-CM

## 2018-03-12 RX ORDER — CLOPIDOGREL BISULFATE 75 MG/1
75 TABLET ORAL DAILY
Qty: 30 TABLET | Refills: 11 | Status: SHIPPED | OUTPATIENT
Start: 2018-03-12 | End: 2019-02-25 | Stop reason: SDUPTHER

## 2018-03-26 ENCOUNTER — OUTSIDE FACILITY SERVICE (OUTPATIENT)
Dept: CARDIOLOGY | Facility: CLINIC | Age: 48
End: 2018-03-26

## 2018-03-26 PROCEDURE — 92978 ENDOLUMINL IVUS OCT C 1ST: CPT | Performed by: INTERNAL MEDICINE

## 2018-03-26 PROCEDURE — 92928 PRQ TCAT PLMT NTRAC ST 1 LES: CPT | Performed by: INTERNAL MEDICINE

## 2018-03-26 PROCEDURE — 93458 L HRT ARTERY/VENTRICLE ANGIO: CPT | Performed by: INTERNAL MEDICINE

## 2018-03-31 DIAGNOSIS — R07.89 OTHER CHEST PAIN: ICD-10-CM

## 2018-04-02 RX ORDER — NITROGLYCERIN 0.4 MG/1
TABLET SUBLINGUAL
Qty: 25 TABLET | Refills: 2 | Status: SHIPPED | OUTPATIENT
Start: 2018-04-02 | End: 2018-04-04 | Stop reason: SDUPTHER

## 2018-04-04 ENCOUNTER — HOSPITAL ENCOUNTER (OUTPATIENT)
Dept: CARDIOLOGY | Facility: HOSPITAL | Age: 48
Discharge: HOME | End: 2018-04-04
Admitting: PHYSICIAN ASSISTANT

## 2018-04-04 ENCOUNTER — HOSPITAL ENCOUNTER (OUTPATIENT)
Dept: CARDIOLOGY | Facility: HOSPITAL | Age: 48
End: 2018-04-04

## 2018-04-04 ENCOUNTER — APPOINTMENT (OUTPATIENT)
Dept: LAB | Facility: HOSPITAL | Age: 48
End: 2018-04-04

## 2018-04-04 ENCOUNTER — OUTSIDE FACILITY SERVICE (OUTPATIENT)
Dept: CARDIOLOGY | Facility: CLINIC | Age: 48
End: 2018-04-04

## 2018-04-04 ENCOUNTER — OFFICE VISIT (OUTPATIENT)
Dept: CARDIOLOGY | Facility: CLINIC | Age: 48
End: 2018-04-04

## 2018-04-04 VITALS
WEIGHT: 315 LBS | DIASTOLIC BLOOD PRESSURE: 85 MMHG | OXYGEN SATURATION: 97 % | SYSTOLIC BLOOD PRESSURE: 134 MMHG | HEIGHT: 72 IN | HEART RATE: 85 BPM | BODY MASS INDEX: 42.66 KG/M2

## 2018-04-04 VITALS — WEIGHT: 308 LBS | HEIGHT: 72 IN | BODY MASS INDEX: 41.72 KG/M2

## 2018-04-04 DIAGNOSIS — M79.602 PAIN OF LEFT UPPER EXTREMITY: ICD-10-CM

## 2018-04-04 DIAGNOSIS — R68.84 JAW PAIN: ICD-10-CM

## 2018-04-04 DIAGNOSIS — R06.02 SHORTNESS OF BREATH: ICD-10-CM

## 2018-04-04 DIAGNOSIS — R07.2 PRECORDIAL PAIN: Primary | ICD-10-CM

## 2018-04-04 DIAGNOSIS — R00.2 PALPITATION: ICD-10-CM

## 2018-04-04 LAB
MAXIMAL PREDICTED HEART RATE: 171 BPM
STRESS TARGET HR: 145 BPM

## 2018-04-04 PROCEDURE — 84484 ASSAY OF TROPONIN QUANT: CPT | Performed by: PHYSICIAN ASSISTANT

## 2018-04-04 PROCEDURE — 93000 ELECTROCARDIOGRAM COMPLETE: CPT | Performed by: PHYSICIAN ASSISTANT

## 2018-04-04 PROCEDURE — 93308 TTE F-UP OR LMTD: CPT

## 2018-04-04 PROCEDURE — 93308 TTE F-UP OR LMTD: CPT | Performed by: INTERNAL MEDICINE

## 2018-04-04 PROCEDURE — 99214 OFFICE O/P EST MOD 30 MIN: CPT | Performed by: PHYSICIAN ASSISTANT

## 2018-04-04 RX ORDER — APIXABAN 5 MG/1
5 TABLET, FILM COATED ORAL 2 TIMES DAILY
Refills: 6 | COMMUNITY
Start: 2018-03-31 | End: 2018-09-04 | Stop reason: SDUPTHER

## 2018-04-04 RX ORDER — INSULIN ASPART 100 [IU]/ML
40 INJECTION, SUSPENSION SUBCUTANEOUS
Refills: 2 | Status: ON HOLD | COMMUNITY
Start: 2018-03-31 | End: 2021-01-27

## 2018-04-04 NOTE — PROGRESS NOTES
Problem list     Subjective   Ricardo Crespo is a 47 y.o. male     Chief Complaint   Patient presents with   • Chest Pain     Here for eval.   • Shortness of Breath   • Palpitations   • Arm Pain   • Jaw Pain       HPI    Problem List:  1. Coronary artery disease  1.1. Stenting of the LAD, 11/2015, per Dr. Moss.  1.2. Repeat catheterization 2-3 weeks after stenting as above, indicating patent stent with 40% PDA stenosis. Medical management was recommended.  1.3. Repeat catheterization, 01/2016, indicating nonobstructive disease with patent stent. Medical management was recommended.  1.4.  Stenting of the PDA, 03/2017, in the setting of low-level symptoms and abnormal stress test.  Previous stent to the LAD was patent.  The patient had nonobstructive disease otherwise.  Medical management was recommended.  1.5.  Stenting of the mid RCA and PTCA only of the distal LAD, 09/17.  1.6.  Repeat LHC, 11/17, in the setting of acute chest pain.  The patient had 50% LAD stenosis, patent stents, and non-obstructive CAD otherwise.  1. 7 repeat left heart catheter March 2018 with stenting of the LAD ×2.  Nonobstructive disease otherwise and patent stent with medical management recommended  2. Preserved systolic function  3. Hypertension  4. Dyslipidemia  5. Diabetes mellitus  6. Sleep apnea  7. Reported history of DVT    Patient is a 47-year-old male that presents back for follow-up catheterization.  He underwent catheterization March 2018 approximate one week ago.  He had high-grade disease in LAD and stenting ×2.  Patient describes an excellent postoperative course.  He had no chest discomfort and shortness of breath improved significantly.    However, yesterday started developing discomfort and jaw discomfort similar to what he felt with previous angina prior to catheterization.  He took nitroglycerin to abort his discomfort.  Patient presents today and is concerned.    Shortness of breath is still improved.  No PND  orthopnea.  He will palpitate at times.  He notices an irregular heartbeat sometimes even with chest pain.  No dizziness presyncope or syncope and otherwise is doing well      Outpatient Encounter Prescriptions as of 4/4/2018   Medication Sig Dispense Refill   • clopidogrel (PLAVIX) 75 MG tablet Take 1 tablet by mouth Daily. 30 tablet 11   • ELIQUIS 5 MG tablet tablet 2 (Two) Times a Day.  6   • furosemide (LASIX) 40 MG tablet Take 1 tablet by mouth Daily for 180 days. 90 tablet 1   • Insulin Glargine (BASAGLAR KWIKPEN) 100 UNIT/ML injection pen Inject 50 Units under the skin 2 (Two) Times a Day.  1   • lithium carbonate 300 MG capsule Take 300 mg by mouth 5 (Five) Times a Day.  2   • nitroglycerin (NITROSTAT) 0.4 MG SL tablet Place 0.4 mg under the tongue Every 5 (Five) Minutes As Needed for Chest Pain.  2   • NOVOLOG MIX 70/30 FLEXPEN (70-30) 100 UNIT/ML suspension pen-injector injection 40 units daily  2   • sertraline (ZOLOFT) 100 MG tablet Take 200 mg by mouth Daily.  2   • spironolactone (ALDACTONE) 50 MG tablet Take 1 tablet by mouth Daily for 180 days. 90 tablet 1   • Evolocumab with Infusor 420 MG/3.5ML solution cartridge Inject 1 applicator under the skin Every 30 (Thirty) Days. 1 cartridge. 11   • [DISCONTINUED] nitroglycerin (NITROSTAT) 0.4 MG SL tablet PLACE 1 TABLET UNDER THE TONGUE EVERY 5 MINUTES UP TO 3 TABLETS IN 15 MINUTES FOR CHEST PAIN. IF NO RELIEF GO TO THE EMERGENCY ROOM OR CALL 25 tablet 2     No facility-administered encounter medications on file as of 4/4/2018.        Beta adrenergic blockers; Calcium channel blockers; and Imdur [isosorbide dinitrate]    Past Medical History:   Diagnosis Date   • Abnormal ECG    • Alcohol liver damage    • Anxiety    • Arrhythmia    • Atrial fibrillation    • Benign hypertension    • Bipolar depression    • CHF (congestive heart failure)    • Cirrhosis of liver    • Clotting disorder    • Coronary artery disease    • DVT (deep venous thrombosis)      Questionable history of deep venous thrombosis.   • Dyslipidemia    • Hyperlipidemia    • Myocardial infarction     x 2   • Obesity    • JIM on CPAP     Obstructive sleep apnea, compliant with CPAP.    • Type 2 diabetes mellitus    • Valvular disease        Social History     Social History   • Marital status:      Spouse name: N/A   • Number of children: N/A   • Years of education: N/A     Occupational History   • Not on file.     Social History Main Topics   • Smoking status: Former Smoker     Packs/day: 1.00     Years: 15.00     Types: Cigarettes     Start date: 8/1/1985     Quit date: 2008   • Smokeless tobacco: Never Used      Comment: Quit 12 years ago; smoked 17 years at 1 PPD   • Alcohol use No      Comment: Quit 12 years ago; drank a fifth a day for 20 years   • Drug use: No   • Sexual activity: Yes     Partners: Female     Birth control/ protection: None     Other Topics Concern   • Not on file     Social History Narrative   • No narrative on file       Family History   Problem Relation Age of Onset   • Heart disease Father      CAD, 25 year old   • Nephrolithiasis Father    • Heart attack Father    • Atrial fibrillation Mother    • Arrhythmia Mother    • No Known Problems Sister        Review of Systems   Constitutional: Positive for fatigue.   HENT: Negative.    Eyes: Negative.    Respiratory: Positive for shortness of breath.    Cardiovascular: Positive for chest pain, palpitations and leg swelling.        Arm pain and jaw pain   Gastrointestinal: Positive for blood in stool.   Endocrine: Negative.    Genitourinary: Negative.    Musculoskeletal: Positive for arthralgias, back pain and myalgias.        Arm pain   Skin: Negative.    Allergic/Immunologic: Negative.    Neurological: Positive for dizziness and light-headedness.   Hematological: Bruises/bleeds easily (bruise).   Psychiatric/Behavioral: Positive for sleep disturbance.       Objective   Vitals:    04/04/18 1433   BP: 134/85   BP  "Location: Left arm   Patient Position: Sitting   Pulse: 85   SpO2: 97%   Weight: (!) 144 kg (318 lb)   Height: 182.9 cm (72.01\")      /85 (BP Location: Left arm, Patient Position: Sitting)   Pulse 85   Ht 182.9 cm (72.01\")   Wt (!) 144 kg (318 lb)   SpO2 97%   BMI 43.12 kg/m²     Lab Results (most recent)     None          Physical Exam   Constitutional: He is oriented to person, place, and time. He appears well-developed and well-nourished. No distress.   HENT:   Head: Normocephalic and atraumatic.   Eyes: EOM are normal. Pupils are equal, round, and reactive to light.   Neck: No JVD present.   Cardiovascular: Normal rate, regular rhythm, normal heart sounds and intact distal pulses.  Exam reveals no gallop and no friction rub.    No murmur heard.  Pulmonary/Chest: Effort normal and breath sounds normal. No respiratory distress. He has no wheezes. He has no rales. He exhibits no tenderness.   Musculoskeletal: Normal range of motion. He exhibits no edema.   Neurological: He is alert and oriented to person, place, and time. No cranial nerve deficit.   Skin: Skin is warm and dry. No rash noted. No erythema. No pallor.   Psychiatric: He has a normal mood and affect. His behavior is normal.   Nursing note and vitals reviewed.      Procedure     ECG 12 Lead  Date/Time: 4/4/2018 2:37 PM  Performed by: LEONOR ROLON  Authorized by: LEONOR ROLON   Comments: EKG demonstrates sinus rhythm at 94 bpm, incomplete right bundle branch block no acute ST changes               Assessment/Plan     Problems Addressed this Visit        Cardiovascular and Mediastinum    Palpitation    Relevant Orders    ECG 12 Lead    Troponin I    Adult Transthoracic Echo Limited W/ Cont if Necessary Per Protocol       Respiratory    Shortness of breath    Relevant Orders    ECG 12 Lead    Troponin I    Adult Transthoracic Echo Limited W/ Cont if Necessary Per Protocol       Nervous and Auditory    Precordial pain - Primary    " Relevant Orders    ECG 12 Lead    Troponin I    Adult Transthoracic Echo Limited W/ Cont if Necessary Per Protocol    Pain of left upper extremity    Relevant Orders    ECG 12 Lead    Troponin I    Adult Transthoracic Echo Limited W/ Cont if Necessary Per Protocol      Other Visit Diagnoses     Jaw pain        Relevant Orders    ECG 12 Lead    Troponin I    Adult Transthoracic Echo Limited W/ Cont if Necessary Per Protocol              Recommendation  1.  Because of chest pain, I would like to order a limited echo to rule out effusion and ensure normal LV function.  Would like to check a troponin today.  2.  We are limited in regards to antianginal therapy.  Patient cannot tolerate beta blockers, calcium channel blockers, nitrates and Ranexa.  3.  I would like to start patient on Repatha as he is felt statin therapies and with significant and repetitive obstructive coronary artery disease, we would like to aggressively treat patient's lipid status.  4.  I discussed with patient that with any chest pain not resolved by nitroglycerin, he will go to the ER.  He is taking Plavix and Eliquis.  He will follow-up with primary as scheduled              Discussed the patient's BMI with him. BMI is above normal parameters. Follow-up plan includes:  educational material.       Electronically signed by:

## 2018-04-04 NOTE — PATIENT INSTRUCTIONS
Obesity, Adult  Obesity is the condition of having too much total body fat. Being overweight or obese means that your weight is greater than what is considered healthy for your body size. Obesity is determined by a measurement called BMI. BMI is an estimate of body fat and is calculated from height and weight. For adults, a BMI of 30 or higher is considered obese.  Obesity can eventually lead to other health concerns and major illnesses, including:  · Stroke.  · Coronary artery disease (CAD).  · Type 2 diabetes.  · Some types of cancer, including cancers of the colon, breast, uterus, and gallbladder.  · Osteoarthritis.  · High blood pressure (hypertension).  · High cholesterol.  · Sleep apnea.  · Gallbladder stones.  · Infertility problems.  What are the causes?  The main cause of obesity is taking in (consuming) more calories than your body uses for energy. Other factors that contribute to this condition may include:  · Being born with genes that make you more likely to become obese.  · Having a medical condition that causes obesity. These conditions include:  ¨ Hypothyroidism.  ¨ Polycystic ovarian syndrome (PCOS).  ¨ Binge-eating disorder.  ¨ Cushing syndrome.  · Taking certain medicines, such as steroids, antidepressants, and seizure medicines.  · Not being physically active (sedentary lifestyle).  · Living where there are limited places to exercise safely or buy healthy foods.  · Not getting enough sleep.  What increases the risk?  The following factors may increase your risk of this condition:  · Having a family history of obesity.  · Being a woman of -American descent.  · Being a man of  descent.  What are the signs or symptoms?  Having excessive body fat is the main symptom of this condition.  How is this diagnosed?  This condition may be diagnosed based on:  · Your symptoms.  · Your medical history.  · A physical exam. Your health care provider may measure:  ¨ Your BMI. If you are an adult  with a BMI between 25 and less than 30, you are considered overweight. If you are an adult with a BMI of 30 or higher, you are considered obese.  ¨ The distances around your hips and your waist (circumferences). These may be compared to each other to help diagnose your condition.  ¨ Your skinfold thickness. Your health care provider may gently pinch a fold of your skin and measure it.  How is this treated?  Treatment for this condition often includes changing your lifestyle. Treatment may include some or all of the following:  · Dietary changes. Work with your health care provider and a dietitian to set a weight-loss goal that is healthy and reasonable for you. Dietary changes may include eating:  ¨ Smaller portions. A portion size is the amount of a particular food that is healthy for you to eat at one time. This varies from person to person.  ¨ Low-calorie or low-fat options.  ¨ More whole grains, fruits, and vegetables.  · Regular physical activity. This may include aerobic activity (cardio) and strength training.  · Medicine to help you lose weight. Your health care provider may prescribe medicine if you are unable to lose 1 pound a week after 6 weeks of eating more healthily and doing more physical activity.  · Surgery. Surgical options may include gastric banding and gastric bypass. Surgery may be done if:  ¨ Other treatments have not helped to improve your condition.  ¨ You have a BMI of 40 or higher.  ¨ You have life-threatening health problems related to obesity.  Follow these instructions at home:     Eating and drinking     · Follow recommendations from your health care provider about what you eat and drink. Your health care provider may advise you to:  ¨ Limit fast foods, sweets, and processed snack foods.  ¨ Choose low-fat options, such as low-fat milk instead of whole milk.  ¨ Eat 5 or more servings of fruits or vegetables every day.  ¨ Eat at home more often. This gives you more control over what you  eat.  ¨ Choose healthy foods when you eat out.  ¨ Learn what a healthy portion size is.  ¨ Keep low-fat snacks on hand.  ¨ Avoid sugary drinks, such as soda, fruit juice, iced tea sweetened with sugar, and flavored milk.  ¨ Eat a healthy breakfast.  · Drink enough water to keep your urine clear or pale yellow.  · Do not go without eating for long periods of time (do not fast) or follow a fad diet. Fasting and fad diets can be unhealthy and even dangerous.  Physical Activity   · Exercise regularly, as told by your health care provider. Ask your health care provider what types of exercise are safe for you and how often you should exercise.  · Warm up and stretch before being active.  · Cool down and stretch after being active.  · Rest between periods of activity.  Lifestyle   · Limit the time that you spend in front of your TV, computer, or video game system.  · Find ways to reward yourself that do not involve food.  · Limit alcohol intake to no more than 1 drink a day for nonpregnant women and 2 drinks a day for men. One drink equals 12 oz of beer, 5 oz of wine, or 1½ oz of hard liquor.  General instructions   · Keep a weight loss journal to keep track of the food you eat and how much you exercise you get.  · Take over-the-counter and prescription medicines only as told by your health care provider.  · Take vitamins and supplements only as told by your health care provider.  · Consider joining a support group. Your health care provider may be able to recommend a support group.  · Keep all follow-up visits as told by your health care provider. This is important.  Contact a health care provider if:  · You are unable to meet your weight loss goal after 6 weeks of dietary and lifestyle changes.  This information is not intended to replace advice given to you by your health care provider. Make sure you discuss any questions you have with your health care provider.  Document Released: 01/25/2006 Document Revised:  05/22/2017 Document Reviewed: 10/05/2016  [employer] Interactive Patient Education © 2017 [employer]  MyPlate from [employer]  The general, healthful diet is based on the 2010 Dietary Guidelines for Americans. The amount of food you need to eat from each food group depends on your age, sex, and level of physical activity and can be individualized by a dietitian. Go to ChooseMyPlate.gov for more information.  What do I need to know about the MyPlate plan?  · Enjoy your food, but eat less.  · Avoid oversized portions.  ¨ ½ of your plate should include fruits and vegetables.  ¨ ¼ of your plate should be grains.  ¨ ¼ of your plate should be protein.  Grains   · Make at least half of your grains whole grains.  · For a 2,000 calorie daily food plan, eat 6 oz every day.  · 1 oz is about 1 slice bread, 1 cup cereal, or ½ cup cooked rice, cereal, or pasta.  Vegetables   · Make half your plate fruits and vegetables.  · For a 2,000 calorie daily food plan, eat 2½ cups every day.  · 1 cup is about 1 cup raw or cooked vegetables or vegetable juice or 2 cups raw leafy greens.  Fruits   · Make half your plate fruits and vegetables.  · For a 2,000 calorie daily food plan, eat 2 cups every day.  · 1 cup is about 1 cup fruit or 100% fruit juice or ½ cup dried fruit.  Protein   · For a 2,000 calorie daily food plan, eat 5½ oz every day.  · 1 oz is about 1 oz meat, poultry, or fish, ¼ cup cooked beans, 1 egg, 1 Tbsp peanut butter, or ½ oz nuts or seeds.  Dairy   · Switch to fat-free or low-fat (1%) milk.  · For a 2,000 calorie daily food plan, eat 3 cups every day.  · 1 cup is about 1 cup milk or yogurt or soy milk (soy beverage), 1½ oz natural cheese, or 2 oz processed cheese.  Fats, Oils, and Empty Calories   · Only small amounts of oils are recommended.  · Empty calories are calories from solid fats or added sugars.  · Compare sodium in foods like soup, bread, and frozen meals. Choose the foods with lower numbers.  · Drink water instead  of sugary drinks.  What foods can I eat?  Grains   Whole grains such as whole wheat, quinoa, millet, and bulgur. Bread, rolls, and pasta made from whole grains. Brown or wild rice. Hot or cold cereals made from whole grains and without added sugar.  Vegetables   All fresh vegetables, especially fresh red, dark green, or orange vegetables. Peas and beans. Low-sodium frozen or canned vegetables prepared without added salt. Low-sodium vegetable juices.  Fruits   All fresh, frozen, and dried fruits. Canned fruit packed in water or fruit juice without added sugar. Fruit juices without added sugar.  Meats and Other Protein Sources   Boiled, baked, or grilled lean meat trimmed of fat. Skinless poultry. Fresh seafood and shellfish. Canned seafood packed in water. Unsalted nuts and unsalted nut butters. Tofu. Dried beans and pea. Eggs.  Dairy   Low-fat or fat-free milk, yogurt, and cheeses.  Sweets and Desserts   Frozen desserts made from low-fat milk.  Fats and Oils   Olive, peanut, and canola oils and margarine. Salad dressing and mayonnaise made from these oils.  Other   Soups and casseroles made from allowed ingredients and without added fat or salt.  The items listed above may not be a complete list of recommended foods or beverages. Contact your dietitian for more options.   What foods are not recommended?  Grains   Sweetened, low-fiber cereals. Packaged baked goods. Snack crackers and chips. Cheese crackers, butter crackers, and biscuits. Frozen waffles, sweet breads, doughnuts, pastries, packaged baking mixes, pancakes, cakes, and cookies.  Vegetables   Regular canned or frozen vegetables or vegetables prepared with salt. Canned tomatoes. Canned tomato sauce. Fried vegetables. Vegetables in cream sauce or cheese sauce.  Fruits   Fruits packed in syrup or made with added sugar.  Meats and Other Protein Sources   Marbled or fatty meats such as ribs. Poultry with skin. Fried meats, poultry, eggs, or fish. Sausages, hot  dogs, and deli meats such as pastrami, bologna, or salami.  Dairy   Whole milk, cream, cheeses made from whole milk, sour cream. Ice cream or yogurt made from whole milk or with added sugar.  Beverages   For adults, no more than one alcoholic drink per day. Regular soft drinks or other sugary beverages. Juice drinks.  Sweets and Desserts   Sugary or fatty desserts, candy, and other sweets.  Fats and Oils   Solid shortening or partially hydrogenated oils. Solid margarine. Margarine that contains trans fats. Butter.  The items listed above may not be a complete list of foods and beverages to avoid. Contact your dietitian for more information.   This information is not intended to replace advice given to you by your health care provider. Make sure you discuss any questions you have with your health care provider.  Document Released: 01/06/2009 Document Revised: 05/25/2017 Document Reviewed: 11/26/2014  [employer] Interactive Patient Education © 2017 [employer] Inc.

## 2018-04-05 ENCOUNTER — APPOINTMENT (OUTPATIENT)
Dept: CARDIOLOGY | Facility: HOSPITAL | Age: 48
End: 2018-04-05

## 2018-04-06 LAB — TROPONIN I SERPL-MCNC: <0.01 NG/ML (ref 0–0.04)

## 2018-04-09 ENCOUNTER — DOCUMENTATION (OUTPATIENT)
Dept: CARDIOLOGY | Facility: CLINIC | Age: 48
End: 2018-04-09

## 2018-04-09 ENCOUNTER — PRIOR AUTHORIZATION (OUTPATIENT)
Dept: CARDIOLOGY | Facility: CLINIC | Age: 48
End: 2018-04-09

## 2018-04-09 NOTE — PROGRESS NOTES
OFFICIAL ECHO REPORT BACK IN THE OFFICE. LIMITED ECHO RESULTS WERE DISCUSSED WITH PATIENT BY DEMETRIUS ROMO ON DAY DONE TO R/O EFFUSION. AYLEEN GELLER

## 2018-06-26 ENCOUNTER — TELEPHONE (OUTPATIENT)
Dept: CARDIOLOGY | Facility: CLINIC | Age: 48
End: 2018-06-26

## 2018-06-26 NOTE — TELEPHONE ENCOUNTER
PATIENT CALLING STILL WITH C/O C/P, JAW PAIN, LEFT ARM PAIN, AND EXTREMELY EXHAUSTED. STATES HE DID NOT TAKE HIS PLAVIX OR ELIQUIS FOR ABOUT A MONTH RECENTLY DUE TO BLEEDING R/T TEAR IN BOWEL THAT HAD TO BE REPAIRED. HE DID NOT KNOW IF THIS HAS CAUSED ANYTHING. HE HAD STENTING ON 3-26-18. PER LEONOR ROLON, PAC BRING FOR A 1:00 APPT. APPT. SCHEDULED FOR DILSHAD. AT 1. PATIENT AWARE. AYLEEN GELLER          ----- Message from Karina Caceres MA sent at 6/26/2018 11:14 AM EDT -----  Patient called regarding a question about an appointment.

## 2018-06-27 ENCOUNTER — OFFICE VISIT (OUTPATIENT)
Dept: CARDIOLOGY | Facility: CLINIC | Age: 48
End: 2018-06-27

## 2018-06-27 VITALS
SYSTOLIC BLOOD PRESSURE: 148 MMHG | DIASTOLIC BLOOD PRESSURE: 91 MMHG | HEIGHT: 72 IN | BODY MASS INDEX: 42.66 KG/M2 | OXYGEN SATURATION: 99 % | WEIGHT: 315 LBS | HEART RATE: 81 BPM

## 2018-06-27 DIAGNOSIS — R07.2 PRECORDIAL PAIN: ICD-10-CM

## 2018-06-27 DIAGNOSIS — I25.119 CORONARY ARTERY DISEASE INVOLVING NATIVE CORONARY ARTERY OF NATIVE HEART WITH ANGINA PECTORIS (HCC): Primary | ICD-10-CM

## 2018-06-27 DIAGNOSIS — I10 ESSENTIAL HYPERTENSION: ICD-10-CM

## 2018-06-27 DIAGNOSIS — R06.02 SHORTNESS OF BREATH: ICD-10-CM

## 2018-06-27 DIAGNOSIS — I48.0 PAROXYSMAL ATRIAL FIBRILLATION (HCC): ICD-10-CM

## 2018-06-27 PROCEDURE — 93000 ELECTROCARDIOGRAM COMPLETE: CPT | Performed by: PHYSICIAN ASSISTANT

## 2018-06-27 PROCEDURE — 99214 OFFICE O/P EST MOD 30 MIN: CPT | Performed by: PHYSICIAN ASSISTANT

## 2018-06-27 NOTE — PROGRESS NOTES
Problem list     Subjective   Ricardo Crespo is a 47 y.o. male     Chief Complaint   Patient presents with   • Chest Pain     daily, worse on exertion , went to ER saturday and was started on imdur, patient BP dropped to 58/30 and stopped imdur    • Shortness of Breath     progressively worsening    • Coronary Artery Disease   • Hyperlipidemia       HPI    Problem List:  1. Coronary artery disease  1.1. Stenting of the LAD, 11/2015, per Dr. Moss.  1.2. Repeat catheterization 2-3 weeks after stenting as above, indicating patent stent with 40% PDA stenosis. Medical management was recommended.  1.3. Repeat catheterization, 01/2016, indicating nonobstructive disease with patent stent. Medical management was recommended.  1.4.  Stenting of the PDA, 03/2017, in the setting of low-level symptoms and abnormal stress test.  Previous stent to the LAD was patent.  The patient had nonobstructive disease otherwise.  Medical management was recommended.  1.5.  Stenting of the mid RCA and PTCA only of the distal LAD, 09/17.  1.6.  Repeat LHC, 11/17, in the setting of acute chest pain.  The patient had 50% LAD stenosis, patent stents, and non-obstructive CAD otherwise.  1. 7 repeat left heart catheter March 2018 with stenting of the LAD ×2.  Nonobstructive disease otherwise and patent stent with medical management recommended  2. Preserved systolic function  3. Hypertension  4. Dyslipidemia  5. Diabetes mellitus  6. Sleep apnea  7. Reported history of DVT    Patient is a 47-year-old male that presents back to the office for follow-up.  Patient presents today complaining of worsening chest discomfort.  Patient underwent stenting of the LAD ×2 in March 2018.  Patient's chest pain resolved and he felt well.  Approximately a month later, he developed a fistula and required colon Surgery.  Around that time, he stopped his antiplatelet and anticoagulation.  Patient noticed that after a few days of stopping his antiplatelet and  anticoagulation that he developed significant chest discomfort.  He developed severe jaw pain and had profound shortness of breath.  Since that time, his functional status has declined.  He will have chest discomfort when he tries to exert but currently is not having chest pain.  He still has profound dyspnea.  Patient can only go minimal distances and be profoundly dyspneic.  Patient is concerned and wanted further evaluation through our office.      Outpatient Encounter Prescriptions as of 6/27/2018   Medication Sig Dispense Refill   • clopidogrel (PLAVIX) 75 MG tablet Take 1 tablet by mouth Daily. 30 tablet 11   • furosemide (LASIX) 40 MG tablet Take 1 tablet by mouth Daily for 180 days. 90 tablet 1   • Insulin Glargine (BASAGLAR KWIKPEN) 100 UNIT/ML injection pen Inject 50 Units under the skin 2 (Two) Times a Day.  1   • lithium carbonate 300 MG capsule Take 300 mg by mouth 5 (Five) Times a Day.  2   • nitroglycerin (NITROSTAT) 0.4 MG SL tablet Place 0.4 mg under the tongue Every 5 (Five) Minutes As Needed for Chest Pain.  2   • NOVOLOG MIX 70/30 FLEXPEN (70-30) 100 UNIT/ML suspension pen-injector injection 40 units daily  2   • sertraline (ZOLOFT) 100 MG tablet Take 200 mg by mouth Daily.  2   • spironolactone (ALDACTONE) 50 MG tablet Take 1 tablet by mouth Daily for 180 days. 90 tablet 1   • ELIQUIS 5 MG tablet tablet 2 (Two) Times a Day.  6   • [DISCONTINUED] Evolocumab with Infusor 420 MG/3.5ML solution cartridge Inject 1 applicator under the skin Every 30 (Thirty) Days. 1 cartridge. 11     No facility-administered encounter medications on file as of 6/27/2018.        Beta adrenergic blockers; Calcium channel blockers; and Imdur [isosorbide dinitrate]    Past Medical History:   Diagnosis Date   • Abnormal ECG    • Alcohol liver damage    • Anxiety    • Arrhythmia    • Atrial fibrillation    • Benign hypertension    • Bipolar depression    • CHF (congestive heart failure)    • Cirrhosis of liver    • Clotting  disorder    • Coronary artery disease    • DVT (deep venous thrombosis)     Questionable history of deep venous thrombosis.   • Dyslipidemia    • Hyperlipidemia    • Myocardial infarction     x 2   • Obesity    • JIM on CPAP     Obstructive sleep apnea, compliant with CPAP.    • Type 2 diabetes mellitus    • Valvular disease        Social History     Social History   • Marital status:      Spouse name: N/A   • Number of children: N/A   • Years of education: N/A     Occupational History   • Not on file.     Social History Main Topics   • Smoking status: Former Smoker     Packs/day: 1.00     Years: 15.00     Types: Cigarettes     Start date: 8/1/1985     Quit date: 2008   • Smokeless tobacco: Never Used      Comment: Quit 12 years ago; smoked 17 years at 1 PPD   • Alcohol use No      Comment: Quit 12 years ago; drank a fifth a day for 20 years   • Drug use: No   • Sexual activity: Yes     Partners: Female     Birth control/ protection: None     Other Topics Concern   • Not on file     Social History Narrative   • No narrative on file       Family History   Problem Relation Age of Onset   • Heart disease Father         CAD, 25 year old   • Nephrolithiasis Father    • Heart attack Father    • Atrial fibrillation Mother    • Arrhythmia Mother    • No Known Problems Sister        Review of Systems   Constitutional: Positive for diaphoresis (chronic ) and fatigue. Negative for chills and fever.   HENT: Negative.    Eyes: Negative for visual disturbance.   Respiratory: Positive for apnea (uses CPAP ), chest tightness and shortness of breath (progressively worsening daily, positive for orthopnea, occurs with very min. exertion ). Negative for cough, choking, wheezing and stridor.    Cardiovascular: Positive for chest pain and leg swelling. Negative for palpitations (afib, irregular heart rate ).   Gastrointestinal: Negative for abdominal pain, blood in stool (no more blood in bowel since anal fistula was fixed ),  "constipation, diarrhea and vomiting.   Endocrine: Negative for cold intolerance and heat intolerance.   Genitourinary: Negative for difficulty urinating, frequency, hematuria and urgency.   Musculoskeletal: Negative.         Jaw pain    Skin: Negative.  Negative for color change, pallor, rash and wound.   Allergic/Immunologic: Negative.  Negative for environmental allergies, food allergies and immunocompromised state.   Neurological: Negative.  Negative for dizziness, tremors, seizures, syncope, facial asymmetry, speech difficulty, weakness, light-headedness, numbness and headaches.   Hematological: Negative.  Negative for adenopathy. Does not bruise/bleed easily.   Psychiatric/Behavioral: Negative.    All other systems reviewed and are negative.      Objective   Vitals:    06/27/18 1314   BP: 148/91   BP Location: Left arm   Patient Position: Sitting   Pulse: 81   SpO2: 99%   Weight: (!) 145 kg (319 lb)   Height: 182.9 cm (72\")      /91 (BP Location: Left arm, Patient Position: Sitting)   Pulse 81   Ht 182.9 cm (72\")   Wt (!) 145 kg (319 lb)   SpO2 99%   BMI 43.26 kg/m²     Lab Results (most recent)     None          Physical Exam   Constitutional: He is oriented to person, place, and time. He appears well-developed and well-nourished. No distress.   HENT:   Head: Normocephalic and atraumatic.   Eyes: EOM are normal. Pupils are equal, round, and reactive to light.   Neck: No JVD present.   Cardiovascular: Normal rate, regular rhythm, normal heart sounds and intact distal pulses.  Exam reveals no gallop and no friction rub.    No murmur heard.  Pulmonary/Chest: Effort normal and breath sounds normal. No respiratory distress. He has no wheezes. He has no rales. He exhibits no tenderness.   Musculoskeletal: Normal range of motion. He exhibits no edema.   Neurological: He is alert and oriented to person, place, and time. No cranial nerve deficit.   Skin: Skin is warm and dry. No rash noted. No erythema. No " pallor.   Psychiatric: He has a normal mood and affect. His behavior is normal.   Nursing note and vitals reviewed.      Procedure     ECG 12 Lead  Date/Time: 6/27/2018 1:23 PM  Performed by: LEONOR ROLON  Authorized by: LEONOR ROLON   Comments: EKG demonstrates sinus rhythm at 74 bpm, incomplete right bundle branch block left anterior fascicular block with no acute ST changes               Assessment/Plan     Problems Addressed this Visit        Cardiovascular and Mediastinum    Coronary artery disease involving native coronary artery of native heart with angina pectoris - Primary    Relevant Orders    ECG 12 Lead    Cardiac catheterization    Basic Metabolic Panel    Essential hypertension    Relevant Orders    ECG 12 Lead    Cardiac catheterization    Basic Metabolic Panel    Paroxysmal atrial fibrillation    Relevant Orders    ECG 12 Lead    Cardiac catheterization    Basic Metabolic Panel       Respiratory    Shortness of breath    Relevant Orders    ECG 12 Lead    Cardiac catheterization    Basic Metabolic Panel       Nervous and Auditory    Precordial pain    Relevant Orders    ECG 12 Lead    Cardiac catheterization    Basic Metabolic Panel             Recommendation  1.  Patient with recent stenting and stopping of his antiplatelet agents developed severe chest discomfort, jaw pain and profound dyspnea.  It is very likely that he has thrombosed his stent.  We did not give patient permission to stop these agents and I am unsure of who did.  Regardless, he will be catheterization to reevaluate coronary disease and to reevaluate stents.  He is currently pain-free at this time but does experience chest pain when he tries to exert.  I am placing him on Ranexa and I have given him samples here in office.  Furthermore, he is not taking his repatha which was ordered because of statin intolerance.  We will see patient back for follow-up after catheterization.  Any chest pain not resolved by nitroglycerin,  he'll go to the ER.  We will see him back for follow-up after evaluation.  Follow-up primary as scheduled              Patient's Body mass index is 43.26 kg/m². BMI is above normal parameters. Recommendations include: educational material.       Electronically signed by:

## 2018-06-27 NOTE — PATIENT INSTRUCTIONS
"Fat and Cholesterol Restricted Diet  Getting too much fat and cholesterol in your diet may cause health problems. Following this diet helps keep your fat and cholesterol at normal levels. This can keep you from getting sick.  What types of fat should I choose?  · Choose monosaturated and polyunsaturated fats. These are found in foods such as olive oil, canola oil, flaxseeds, walnuts, almonds, and seeds.  · Eat more omega-3 fats. Good choices include salmon, mackerel, sardines, tuna, flaxseed oil, and ground flaxseeds.  · Limit saturated fats. These are in animal products such as meats, butter, and cream. They can also be in plant products such as palm oil, palm kernel oil, and coconut oil.  · Avoid foods with partially hydrogenated oils in them. These contain trans fats. Examples of foods that have trans fats are stick margarine, some tub margarines, cookies, crackers, and other baked goods.  What general guidelines do I need to follow?  · Check food labels. Look for the words \"trans fat\" and \"saturated fat.\"  · When preparing a meal:  ? Fill half of your plate with vegetables and green salads.  ? Fill one fourth of your plate with whole grains. Look for the word \"whole\" as the first word in the ingredient list.  ? Fill one fourth of your plate with lean protein foods.  · Eat more foods that have fiber, like apples, carrots, beans, peas, and barley.  · Eat more home-cooked foods. Eat less at restaurants and buffets.  · Limit or avoid alcohol.  · Limit foods high in starch and sugar.  · Limit fried foods.  · Cook foods without frying them. Baking, boiling, grilling, and broiling are all great options.  · Lose weight if you are overweight. Losing even a small amount of weight can help your overall health. It can also help prevent diseases such as diabetes and heart disease.  What foods can I eat?  Grains  Whole grains, such as whole wheat or whole grain breads, crackers, cereals, and pasta. Unsweetened oatmeal, " bulgur, barley, quinoa, or brown rice. Corn or whole wheat flour tortillas.  Vegetables  Fresh or frozen vegetables (raw, steamed, roasted, or grilled). Green salads.  Fruits  All fresh, canned (in natural juice), or frozen fruits.  Meat and Other Protein Products  Ground beef (85% or leaner), grass-fed beef, or beef trimmed of fat. Skinless chicken or turkey. Ground chicken or turkey. Pork trimmed of fat. All fish and seafood. Eggs. Dried beans, peas, or lentils. Unsalted nuts or seeds. Unsalted canned or dry beans.  Dairy  Low-fat dairy products, such as skim or 1% milk, 2% or reduced-fat cheeses, low-fat ricotta or cottage cheese, or plain low-fat yogurt.  Fats and Oils  Tub margarines without trans fats. Light or reduced-fat mayonnaise and salad dressings. Avocado. Olive, canola, sesame, or safflower oils. Natural peanut or almond butter (choose ones without added sugar and oil).  The items listed above may not be a complete list of recommended foods or beverages. Contact your dietitian for more options.  What foods are not recommended?  Grains  White bread. White pasta. White rice. Cornbread. Bagels, pastries, and croissants. Crackers that contain trans fat.  Vegetables  White potatoes. Corn. Creamed or fried vegetables. Vegetables in a cheese sauce.  Fruits  Dried fruits. Canned fruit in light or heavy syrup. Fruit juice.  Meat and Other Protein Products  Fatty cuts of meat. Ribs, chicken wings, valencia, sausage, bologna, salami, chitterlings, fatback, hot dogs, bratwurst, and packaged luncheon meats. Liver and organ meats.  Dairy  Whole or 2% milk, cream, half-and-half, and cream cheese. Whole milk cheeses. Whole-fat or sweetened yogurt. Full-fat cheeses. Nondairy creamers and whipped toppings. Processed cheese, cheese spreads, or cheese curds.  Sweets and Desserts  Corn syrup, sugars, honey, and molasses. Candy. Jam and jelly. Syrup. Sweetened cereals. Cookies, pies, cakes, donuts, muffins, and ice  cream.  Fats and Oils  Butter, stick margarine, lard, shortening, ghee, or valencia fat. Coconut, palm kernel, or palm oils.  Beverages  Alcohol. Sweetened drinks (such as sodas, lemonade, and fruit drinks or punches).  The items listed above may not be a complete list of foods and beverages to avoid. Contact your dietitian for more information.  This information is not intended to replace advice given to you by your health care provider. Make sure you discuss any questions you have with your health care provider.  Document Released: 06/18/2013 Document Revised: 08/24/2017 Document Reviewed: 03/19/2015  [employer] Interactive Patient Education © 2018 [employer]  BMI for Adults  Body mass index (BMI) is a number that is calculated from a person's weight and height. In most adults, the number is used to find how much of an adult's weight is made up of fat. BMI is not as accurate as a direct measure of body fat.  How is BMI calculated?  BMI is calculated by dividing weight in kilograms by height in meters squared. It can also be calculated by dividing weight in pounds by height in inches squared, then multiplying the resulting number by 703. Charts are available to help you find your BMI quickly and easily without doing this calculation.  How is BMI interpreted?  Health care professionals use BMI charts to identify whether an adult is underweight, at a normal weight, or overweight based on the following guidelines:  · Underweight: BMI less than 18.5.  · Normal weight: BMI between 18.5 and 24.9.  · Overweight: BMI between 25 and 29.9.  · Obese: BMI of 30 and above.    BMI is usually interpreted the same for males and females.  Weight includes both fat and muscle, so someone with a muscular build, such as an athlete, may have a BMI that is higher than 24.9. In cases like these, BMI may not accurately depict body fat. To determine if excess body fat is the cause of a BMI of 25 or higher, further assessments may need to be  done by a health care provider.  Why is BMI a useful tool?  BMI is used to identify a possible weight problem that may be related to a medical problem or may increase the risk for medical problems. BMI can also be used to promote changes to reach a healthy weight.  This information is not intended to replace advice given to you by your health care provider. Make sure you discuss any questions you have with your health care provider.  Document Released: 08/29/2005 Document Revised: 04/27/2017 Document Reviewed: 05/15/2015  [employer] Interactive Patient Education © 2018 [employer]

## 2018-06-30 DIAGNOSIS — R07.89 OTHER CHEST PAIN: ICD-10-CM

## 2018-07-02 ENCOUNTER — TELEPHONE (OUTPATIENT)
Dept: CARDIOLOGY | Facility: CLINIC | Age: 48
End: 2018-07-02

## 2018-07-02 RX ORDER — NITROGLYCERIN 0.4 MG/1
TABLET SUBLINGUAL
Qty: 25 TABLET | Refills: 2 | Status: SHIPPED | OUTPATIENT
Start: 2018-07-02 | End: 2018-08-08

## 2018-07-02 NOTE — TELEPHONE ENCOUNTER
WIFE AWARE CATH. OPENING FOR 1:00 PM ON Thursday, BUT IS PENDING ON WHETHER  IS PHYSICALLY ABLE TO DO IT ON Thursday. SHE IS AWARE WE MAY KNOW DILSHAD. HOW TO PROCEED. VERBALIZED OK. PH,LPN      ----- Message from Letty Goode LPN sent at 7/2/2018 11:45 AM EDT -----  Regarding: FW: Non-Urgent Medical Question  Contact: [phone]  I do not have any open spots on the schedule at this time. There was a cancellation on 7-5-18, however a MARIS/Cardioversion was put in its place.   ----- Message -----  From: Taya Gonzalez LPN  Sent: 6/28/2018   4:08 PM  To: Letty Goode LPN  Subject: FW: Non-Urgent Medical Question                   [facility] STAFF MENTIONED SOMEONE CALLED TO CANCEL THEIR LHC FOR 7-5? CAN WE USE THIS SPOT FOR MR. HERRON?  ----- Message -----  From: Ricardo Herron  Sent: 6/28/2018   8:18 AM  [facility]: [facility] [place]  Subject: Non-Urgent Medical Question                      Taya   I have a [facility] scheduled for the 18th of July in [place].  Do you think there would be any chance of an earlier appt.?   It could be in [place] or any location.  If not I will hold on, just having some pretty bad chest pain.  Thanks

## 2018-07-03 ENCOUNTER — TELEPHONE (OUTPATIENT)
Dept: CARDIOLOGY | Facility: CLINIC | Age: 48
End: 2018-07-03

## 2018-07-03 NOTE — TELEPHONE ENCOUNTER
[facility] CHANGED FROM 7-19-18 TO 7-5-18 AT 1:00 PM ARRIVE AT 11:00 AM. PER  SHOULD BE GOOD TO GO. CALLED AND RELAYED TO PATIENT AND HE STOPPED HIS ELIQUIS YEST. AWARE OF DATE AND TIME. PH,ELINN

## 2018-07-05 ENCOUNTER — OUTSIDE FACILITY SERVICE (OUTPATIENT)
Dept: CARDIOLOGY | Facility: CLINIC | Age: 48
End: 2018-07-05

## 2018-07-05 PROCEDURE — 93458 L HRT ARTERY/VENTRICLE ANGIO: CPT | Performed by: INTERNAL MEDICINE

## 2018-07-16 ENCOUNTER — TELEPHONE (OUTPATIENT)
Dept: CARDIOLOGY | Facility: CLINIC | Age: 48
End: 2018-07-16

## 2018-07-16 DIAGNOSIS — I50.32 CHRONIC DIASTOLIC CONGESTIVE HEART FAILURE (HCC): ICD-10-CM

## 2018-07-16 RX ORDER — FUROSEMIDE 40 MG/1
TABLET ORAL
Qty: 90 TABLET | Refills: 1 | OUTPATIENT
Start: 2018-07-16

## 2018-07-16 RX ORDER — FUROSEMIDE 40 MG/1
40 TABLET ORAL DAILY
Qty: 30 TABLET | Refills: 11 | Status: SHIPPED | OUTPATIENT
Start: 2018-07-16 | End: 2019-07-19 | Stop reason: SDUPTHER

## 2018-07-16 NOTE — TELEPHONE ENCOUNTER
----- Message from Ricardo Crespo sent at 7/16/2018  5:56 PM EDT -----  Regarding: RE: Non-Urgent Medical Question  Contact: [phone]  I have no refills left on my lasix 40 mg.  Could that be sent to [facility] in [place].  Thanka  ----- Message -----  From: AYLEEN MOREJON  Sent: 6/18/2018 11:30 AM EDT  To: Ricardo Crespo  Subject: RE: Non-Urgent Medical Question  Per Amanuel Nuñez PAC call the office and schedule an appt. Thank You, Nursing Staff    ----- Message -----     From: Ricardo Crespo     Sent: 6/18/2018  9:09 AM EDT       To: Leo [facility] MD  Subject: Non-Urgent Medical Question    Hello,   I have  been having chest and jaw pain the last few days along with extreme fatigue.  Few months ago dr. Ramirez done some work on some blockages and I was feeling very good until  recently.  I sort of blacked out and hit my head Saturday '. I hate to be a whiner but pretty sick.  What should I do.

## 2018-08-08 ENCOUNTER — OFFICE VISIT (OUTPATIENT)
Dept: CARDIOLOGY | Facility: CLINIC | Age: 48
End: 2018-08-08

## 2018-08-08 VITALS
BODY MASS INDEX: 42.66 KG/M2 | WEIGHT: 315 LBS | HEIGHT: 72 IN | DIASTOLIC BLOOD PRESSURE: 89 MMHG | SYSTOLIC BLOOD PRESSURE: 137 MMHG | OXYGEN SATURATION: 96 % | HEART RATE: 79 BPM

## 2018-08-08 DIAGNOSIS — R06.02 SHORTNESS OF BREATH: Primary | ICD-10-CM

## 2018-08-08 DIAGNOSIS — I25.10 CORONARY ARTERY DISEASE INVOLVING NATIVE CORONARY ARTERY OF NATIVE HEART WITHOUT ANGINA PECTORIS: ICD-10-CM

## 2018-08-08 DIAGNOSIS — R07.9 CHEST PAIN, UNSPECIFIED TYPE: ICD-10-CM

## 2018-08-08 PROCEDURE — 99213 OFFICE O/P EST LOW 20 MIN: CPT | Performed by: PHYSICIAN ASSISTANT

## 2018-08-08 RX ORDER — SPIRONOLACTONE 50 MG/1
50 TABLET, FILM COATED ORAL DAILY
Refills: 1 | COMMUNITY
Start: 2018-07-31 | End: 2019-07-08 | Stop reason: SDUPTHER

## 2018-08-08 NOTE — PROGRESS NOTES
Problem list     Subjective   Ricardo Crespo is a 48 y.o. male     Chief Complaint   Patient presents with   • Coronary Artery Disease     presents as a follow up       HPI    Problem List:  1. Coronary artery disease  1.1. Stenting of the LAD, 11/2015, per Dr. Moss.  1.2. Repeat catheterization 2-3 weeks after stenting as above, indicating patent stent with 40% PDA stenosis. Medical management was recommended.  1.3. Repeat catheterization, 01/2016, indicating nonobstructive disease with patent stent. Medical management was recommended.  1.4.  Stenting of the PDA, 03/2017, in the setting of low-level symptoms and abnormal stress test.  Previous stent to the LAD was patent.  The patient had nonobstructive disease otherwise.  Medical management was recommended.  1.5.  Stenting of the mid RCA and PTCA only of the distal LAD, 09/17.  1.6.  Repeat LHC, 11/17, in the setting of acute chest pain.  The patient had 50% LAD stenosis, patent stents, and non-obstructive CAD otherwise.  1. 7 repeat left heart catheter March 2018 with stenting of the LAD ×2.  Nonobstructive disease otherwise and patent stent with medical management recommended  1.8 follow-up catheterization July 2018 because of posterolateral ischemia on stress testing and patient having stopped his antiplatelet and anticoagulant therapy for a short period demonstrated nonobstructive disease.  790% in the distal LAD with a 1 mm vessel.  Proximal 50% noted in the ostium.  Medical management recommended  2. Preserved systolic function  3. Hypertension  4. Dyslipidemia  5. Diabetes mellitus  6. Sleep apnea  7. Reported history of DVT  8.  Paroxysmal atrial fibrillation on Eliquis for anticoagulation0  9.  Chronic liver failure and cirrhosis    Patient is a 48-year-old male that presents back from catheterization.  He is doing well.  He has stable angina.  If he exerts for extended levels he will fill chest pressure.  This is been chronic without progression.   Shortness of breath is moderate at baseline as well.  He has no PND orthopnea.        he will palpitate on occasion.  No dizziness presyncope or syncope.  No evidence of bleeding on anticoagulation and otherwise is doing well    Outpatient Encounter Prescriptions as of 8/8/2018   Medication Sig Dispense Refill   • clopidogrel (PLAVIX) 75 MG tablet Take 1 tablet by mouth Daily. 30 tablet 11   • ELIQUIS 5 MG tablet tablet 5 mg 2 (Two) Times a Day.  6   • furosemide (LASIX) 40 MG tablet Take 1 tablet by mouth Daily. 30 tablet 11   • Insulin Glargine (BASAGLAR KWIKPEN) 100 UNIT/ML injection pen Inject 50 Units under the skin 2 (Two) Times a Day.  1   • lithium carbonate 300 MG capsule Take 300 mg by mouth 5 (Five) Times a Day.  2   • nitroglycerin (NITROSTAT) 0.4 MG SL tablet Place 0.4 mg under the tongue Every 5 (Five) Minutes As Needed for Chest Pain.  2   • NOVOLOG MIX 70/30 FLEXPEN (70-30) 100 UNIT/ML suspension pen-injector injection Inject 40 Units under the skin into the appropriate area as directed 2 (Two) Times a Day Before Meals. 40 units daily  2   • sertraline (ZOLOFT) 100 MG tablet Take 200 mg by mouth Daily.  2   • spironolactone (ALDACTONE) 50 MG tablet Take 50 mg by mouth Daily.  1   • [DISCONTINUED] nitroglycerin (NITROSTAT) 0.4 MG SL tablet PLACE 1 TABLET UNDER THE TONGUE EVERY 5 MINUTES UP TO 3 TABLETS IN 15 MINUTES FOR CHEST PAIN. IF NO RELIEF GO TO THE EMERGENCY ROOM OR CALL 25 tablet 2     No facility-administered encounter medications on file as of 8/8/2018.        Beta adrenergic blockers; Calcium channel blockers; and Imdur [isosorbide dinitrate]    Past Medical History:   Diagnosis Date   • Abnormal ECG    • Alcohol liver damage (CMS/HCC)    • Anxiety    • Arrhythmia    • Atrial fibrillation (CMS/HCC)    • Benign hypertension    • Bipolar depression (CMS/HCC)    • CHF (congestive heart failure) (CMS/HCC)    • Cirrhosis of liver (CMS/HCC)    • Clotting disorder (CMS/HCC)    • Coronary artery  disease    • DVT (deep venous thrombosis) (CMS/[facility])     Questionable history of deep venous thrombosis.   • Dyslipidemia    • Hyperlipidemia    • Myocardial infarction     x 2   • Obesity    • JIM on CPAP     Obstructive sleep apnea, compliant with CPAP.    • Type 2 diabetes mellitus (CMS/[facility])    • Valvular disease        Social History     Social History   • Marital status:      Spouse name: N/A   • Number of children: N/A   • Years of education: N/A     Occupational History   • Not on file.     Social History Main Topics   • Smoking status: Former Smoker     Packs/day: 1.00     Years: 15.00     Types: Cigarettes     Start date: 8/1/1985     Quit date: 2008   • Smokeless tobacco: Never Used      Comment: Quit 12 years ago; smoked 17 years at 1 PPD   • Alcohol use No      Comment: Quit 12 years ago; drank a fifth a day for 20 years   • Drug use: No   • Sexual activity: Yes     Partners: Female     Birth control/ protection: None     Other Topics Concern   • Not on file     Social History Narrative   • No narrative on file       Family History   Problem Relation Age of Onset   • Heart disease Father         CAD, 25 year old   • Nephrolithiasis Father    • Heart attack Father    • Atrial fibrillation Mother    • Arrhythmia Mother    • No Known Problems Sister        Review of Systems   Constitutional: Positive for fatigue.   HENT: Negative.    Eyes: Positive for visual disturbance ( wears glasses ).   Respiratory: Positive for shortness of breath.    Cardiovascular: Positive for chest pain, palpitations and leg swelling (right leg swelling ).   Gastrointestinal: Positive for diarrhea. Negative for constipation.   Endocrine: Negative.    Genitourinary: Negative for difficulty urinating.   Musculoskeletal: Positive for arthralgias and myalgias.   Skin: Negative.    Allergic/Immunologic: Negative for environmental allergies and food allergies.   Neurological: Positive for dizziness and light-headedness.  "  Hematological: Bruises/bleeds easily.   Psychiatric/Behavioral: The patient is nervous/anxious.    All other systems reviewed and are negative.      Objective   Vitals:    08/08/18 1110   BP: 137/89   BP Location: Left arm   Patient Position: Sitting   Pulse: 79   SpO2: 96%   Weight: (!) 143 kg (316 lb 3.2 oz)   Height: 182.9 cm (72\")      /89 (BP Location: Left arm, Patient Position: Sitting)   Pulse 79   Ht 182.9 cm (72\")   Wt (!) 143 kg (316 lb 3.2 oz)   SpO2 96%   BMI 42.88 kg/m²     Lab Results (most recent)     None          Physical Exam   Constitutional: He is oriented to person, place, and time. He appears well-developed and well-nourished. No distress.   HENT:   Head: Normocephalic and atraumatic.   Eyes: Pupils are equal, round, and reactive to light. EOM are normal.   Neck: No JVD present.   Cardiovascular: Normal rate, regular rhythm, normal heart sounds and intact distal pulses.  Exam reveals no gallop and no friction rub.    No murmur heard.  Pulmonary/Chest: Effort normal and breath sounds normal. No respiratory distress. He has no wheezes. He has no rales. He exhibits no tenderness.   Abdominal: Soft.   Musculoskeletal: Normal range of motion. He exhibits no edema.   Neurological: He is alert and oriented to person, place, and time. No cranial nerve deficit.   Skin: Skin is warm and dry. No rash noted. No erythema. No pallor.   Psychiatric: He has a normal mood and affect. His behavior is normal.   Nursing note and vitals reviewed.      Procedure   Procedures       Assessment/Plan     Problems Addressed this Visit        Cardiovascular and Mediastinum    Coronary artery disease involving native coronary artery of native heart without angina pectoris       Respiratory    Shortness of breath - Primary       Nervous and Auditory    Chest pain              Recommendation  1.  Patient has stable angina at this time with catheterization showing nonobstructive disease.  Distal LAD disease not " amenable for percutaneous intervention.  We'll continue Ranexa.  He cannot tolerate isosorbide.  He will monitor symptoms closely.  If chest pain worsens, he will contact our office.  Otherwise we'll see back for follow-up in 3-4 months follow-up primary as scheduled             Patient's Body mass index is 42.88 kg/m². BMI is above normal parameters. Recommendations include: educational material.       Electronically signed by:

## 2018-08-08 NOTE — PATIENT INSTRUCTIONS
Obesity, Adult  Obesity is the condition of having too much total body fat. Being overweight or obese means that your weight is greater than what is considered healthy for your body size. Obesity is determined by a measurement called BMI. BMI is an estimate of body fat and is calculated from height and weight. For adults, a BMI of 30 or higher is considered obese.  Obesity can eventually lead to other health concerns and major illnesses, including:  · Stroke.  · Coronary artery disease (CAD).  · Type 2 diabetes.  · Some types of cancer, including cancers of the colon, breast, uterus, and gallbladder.  · Osteoarthritis.  · High blood pressure (hypertension).  · High cholesterol.  · Sleep apnea.  · Gallbladder stones.  · Infertility problems.    What are the causes?  The main cause of obesity is taking in (consuming) more calories than your body uses for energy. Other factors that contribute to this condition may include:  · Being born with genes that make you more likely to become obese.  · Having a medical condition that causes obesity. These conditions include:  ? Hypothyroidism.  ? Polycystic ovarian syndrome (PCOS).  ? Binge-eating disorder.  ? Cushing syndrome.  · Taking certain medicines, such as steroids, antidepressants, and seizure medicines.  · Not being physically active (sedentary lifestyle).  · Living where there are limited places to exercise safely or buy healthy foods.  · Not getting enough sleep.    What increases the risk?  The following factors may increase your risk of this condition:  · Having a family history of obesity.  · Being a woman of -American descent.  · Being a man of  descent.    What are the signs or symptoms?  Having excessive body fat is the main symptom of this condition.  How is this diagnosed?  This condition may be diagnosed based on:  · Your symptoms.  · Your medical history.  · A physical exam. Your health care provider may measure:  ? Your BMI. If you are an  adult with a BMI between 25 and less than 30, you are considered overweight. If you are an adult with a BMI of 30 or higher, you are considered obese.  ? The distances around your hips and your waist (circumferences). These may be compared to each other to help diagnose your condition.  ? Your skinfold thickness. Your health care provider may gently pinch a fold of your skin and measure it.    How is this treated?  Treatment for this condition often includes changing your lifestyle. Treatment may include some or all of the following:  · Dietary changes. Work with your health care provider and a dietitian to set a weight-loss goal that is healthy and reasonable for you. Dietary changes may include eating:  ? Smaller portions. A portion size is the amount of a particular food that is healthy for you to eat at one time. This varies from person to person.  ? Low-calorie or low-fat options.  ? More whole grains, fruits, and vegetables.  · Regular physical activity. This may include aerobic activity (cardio) and strength training.  · Medicine to help you lose weight. Your health care provider may prescribe medicine if you are unable to lose 1 pound a week after 6 weeks of eating more healthily and doing more physical activity.  · Surgery. Surgical options may include gastric banding and gastric bypass. Surgery may be done if:  ? Other treatments have not helped to improve your condition.  ? You have a BMI of 40 or higher.  ? You have life-threatening health problems related to obesity.    Follow these instructions at home:    Eating and drinking    · Follow recommendations from your health care provider about what you eat and drink. Your health care provider may advise you to:  ? Limit fast foods, sweets, and processed snack foods.  ? Choose low-fat options, such as low-fat milk instead of whole milk.  ? Eat 5 or more servings of fruits or vegetables every day.  ? Eat at home more often. This gives you more control over  what you eat.  ? Choose healthy foods when you eat out.  ? Learn what a healthy portion size is.  ? Keep low-fat snacks on hand.  ? Avoid sugary drinks, such as soda, fruit juice, iced tea sweetened with sugar, and flavored milk.  ? Eat a healthy breakfast.  · Drink enough water to keep your urine clear or pale yellow.  · Do not go without eating for long periods of time (do not fast) or follow a fad diet. Fasting and fad diets can be unhealthy and even dangerous.  Physical Activity  · Exercise regularly, as told by your health care provider. Ask your health care provider what types of exercise are safe for you and how often you should exercise.  · Warm up and stretch before being active.  · Cool down and stretch after being active.  · Rest between periods of activity.  Lifestyle  · Limit the time that you spend in front of your TV, computer, or video game system.  · Find ways to reward yourself that do not involve food.  · Limit alcohol intake to no more than 1 drink a day for nonpregnant women and 2 drinks a day for men. One drink equals 12 oz of beer, 5 oz of wine, or 1½ oz of hard liquor.  General instructions  · Keep a weight loss journal to keep track of the food you eat and how much you exercise you get.  · Take over-the-counter and prescription medicines only as told by your health care provider.  · Take vitamins and supplements only as told by your health care provider.  · Consider joining a support group. Your health care provider may be able to recommend a support group.  · Keep all follow-up visits as told by your health care provider. This is important.  Contact a health care provider if:  · You are unable to meet your weight loss goal after 6 weeks of dietary and lifestyle changes.  This information is not intended to replace advice given to you by your health care provider. Make sure you discuss any questions you have with your health care provider.  Document Released: 01/25/2006 Document Revised:  05/22/2017 Document Reviewed: 10/05/2016  [employer] Interactive Patient Education © 2018 [employer]  MyPlate from [employer]  The general, healthful diet is based on the 2010 Dietary Guidelines for Americans. The amount of food you need to eat from each food group depends on your age, sex, and level of physical activity and can be individualized by a dietitian. Go to ChooseMyPlate.gov for more information.  What do I need to know about the MyPlate plan?  · Enjoy your food, but eat less.  · Avoid oversized portions.  ? ½ of your plate should include fruits and vegetables.  ? ¼ of your plate should be grains.  ? ¼ of your plate should be protein.  Grains  · Make at least half of your grains whole grains.  · For a 2,000 calorie daily food plan, eat 6 oz every day.  · 1 oz is about 1 slice bread, 1 cup cereal, or ½ cup cooked rice, cereal, or pasta.  Vegetables  · Make half your plate fruits and vegetables.  · For a 2,000 calorie daily food plan, eat 2½ cups every day.  · 1 cup is about 1 cup raw or cooked vegetables or vegetable juice or 2 cups raw leafy greens.  Fruits  · Make half your plate fruits and vegetables.  · For a 2,000 calorie daily food plan, eat 2 cups every day.  · 1 cup is about 1 cup fruit or 100% fruit juice or ½ cup dried fruit.  Protein  · For a 2,000 calorie daily food plan, eat 5½ oz every day.  · 1 oz is about 1 oz meat, poultry, or fish, ¼ cup cooked beans, 1 egg, 1 Tbsp peanut butter, or ½ oz nuts or seeds.  Dairy  · Switch to fat-free or low-fat (1%) milk.  · For a 2,000 calorie daily food plan, eat 3 cups every day.  · 1 cup is about 1 cup milk or yogurt or soy milk (soy beverage), 1½ oz natural cheese, or 2 oz processed cheese.  Fats, Oils, and Empty Calories  · Only small amounts of oils are recommended.  · Empty calories are calories from solid fats or added sugars.  · Compare sodium in foods like soup, bread, and frozen meals. Choose the foods with lower numbers.  · Drink water instead of  sugary drinks.  What foods can I eat?  Grains  Whole grains such as whole wheat, quinoa, millet, and bulgur. Bread, rolls, and pasta made from whole grains. Brown or wild rice. Hot or cold cereals made from whole grains and without added sugar.  Vegetables  All fresh vegetables, especially fresh red, dark green, or orange vegetables. Peas and beans. Low-sodium frozen or canned vegetables prepared without added salt. Low-sodium vegetable juices.  Fruits  All fresh, frozen, and dried fruits. Canned fruit packed in water or fruit juice without added sugar. Fruit juices without added sugar.  Meats and Other Protein Sources  Boiled, baked, or grilled lean meat trimmed of fat. Skinless poultry. Fresh seafood and shellfish. Canned seafood packed in water. Unsalted nuts and unsalted nut butters. Tofu. Dried beans and pea. Eggs.  Dairy  Low-fat or fat-free milk, yogurt, and cheeses.  Sweets and Desserts  Frozen desserts made from low-fat milk.  Fats and Oils  Olive, peanut, and canola oils and margarine. Salad dressing and mayonnaise made from these oils.  Other  Soups and casseroles made from allowed ingredients and without added fat or salt.  The items listed above may not be a complete list of recommended foods or beverages. Contact your dietitian for more options.  What foods are not recommended?  Grains  Sweetened, low-fiber cereals. Packaged baked goods. Snack crackers and chips. Cheese crackers, butter crackers, and biscuits. Frozen waffles, sweet breads, doughnuts, pastries, packaged baking mixes, pancakes, cakes, and cookies.  Vegetables  Regular canned or frozen vegetables or vegetables prepared with salt. Canned tomatoes. Canned tomato sauce. Fried vegetables. Vegetables in cream sauce or cheese sauce.  Fruits  Fruits packed in syrup or made with added sugar.  Meats and Other Protein Sources  Marbled or fatty meats such as ribs. Poultry with skin. Fried meats, poultry, eggs, or fish. Sausages, hot dogs, and deli  meats such as pastrami, bologna, or salami.  Dairy  Whole milk, cream, cheeses made from whole milk, sour cream. Ice cream or yogurt made from whole milk or with added sugar.  Beverages  For adults, no more than one alcoholic drink per day. Regular soft drinks or other sugary beverages. Juice drinks.  Sweets and Desserts  Sugary or fatty desserts, candy, and other sweets.  Fats and Oils  Solid shortening or partially hydrogenated oils. Solid margarine. Margarine that contains trans fats. Butter.  The items listed above may not be a complete list of foods and beverages to avoid. Contact your dietitian for more information.  This information is not intended to replace advice given to you by your health care provider. Make sure you discuss any questions you have with your health care provider.  Document Released: 01/06/2009 Document Revised: 05/25/2017 Document Reviewed: 11/26/2014  1[employer] Interactive Patient Education © 2018 [employer]

## 2018-09-04 DIAGNOSIS — I25.10 CORONARY ARTERY DISEASE DUE TO CALCIFIED CORONARY LESION: Primary | ICD-10-CM

## 2018-09-04 DIAGNOSIS — I25.84 CORONARY ARTERY DISEASE DUE TO CALCIFIED CORONARY LESION: Primary | ICD-10-CM

## 2018-09-04 RX ORDER — APIXABAN 5 MG/1
5 TABLET, FILM COATED ORAL 2 TIMES DAILY
Qty: 60 TABLET | Refills: 5 | Status: SHIPPED | OUTPATIENT
Start: 2018-09-04 | End: 2019-05-17 | Stop reason: SDUPTHER

## 2018-10-12 DIAGNOSIS — I50.32 CHRONIC DIASTOLIC CONGESTIVE HEART FAILURE (HCC): ICD-10-CM

## 2018-10-12 RX ORDER — SPIRONOLACTONE 50 MG/1
TABLET, FILM COATED ORAL
Qty: 90 TABLET | Refills: 1 | OUTPATIENT
Start: 2018-10-12

## 2018-10-16 ENCOUNTER — OUTSIDE FACILITY SERVICE (OUTPATIENT)
Dept: CARDIOLOGY | Facility: CLINIC | Age: 48
End: 2018-10-16

## 2018-10-16 PROCEDURE — 93018 CV STRESS TEST I&R ONLY: CPT | Performed by: INTERNAL MEDICINE

## 2018-10-16 PROCEDURE — 78452 HT MUSCLE IMAGE SPECT MULT: CPT | Performed by: INTERNAL MEDICINE

## 2018-11-08 ENCOUNTER — OFFICE VISIT (OUTPATIENT)
Dept: CARDIOLOGY | Facility: CLINIC | Age: 48
End: 2018-11-08

## 2018-11-08 VITALS
HEIGHT: 72 IN | DIASTOLIC BLOOD PRESSURE: 83 MMHG | OXYGEN SATURATION: 98 % | HEART RATE: 83 BPM | SYSTOLIC BLOOD PRESSURE: 119 MMHG | WEIGHT: 315 LBS | BODY MASS INDEX: 42.66 KG/M2

## 2018-11-08 DIAGNOSIS — R07.9 CHEST PAIN, UNSPECIFIED TYPE: ICD-10-CM

## 2018-11-08 DIAGNOSIS — E78.5 HYPERLIPIDEMIA LDL GOAL <70: ICD-10-CM

## 2018-11-08 DIAGNOSIS — I10 ESSENTIAL HYPERTENSION: ICD-10-CM

## 2018-11-08 DIAGNOSIS — R06.02 SHORTNESS OF BREATH: ICD-10-CM

## 2018-11-08 DIAGNOSIS — I48.0 PAROXYSMAL ATRIAL FIBRILLATION (HCC): ICD-10-CM

## 2018-11-08 DIAGNOSIS — I25.10 CORONARY ARTERY DISEASE INVOLVING NATIVE CORONARY ARTERY OF NATIVE HEART WITHOUT ANGINA PECTORIS: Primary | ICD-10-CM

## 2018-11-08 DIAGNOSIS — Z99.89 OSA ON CPAP: ICD-10-CM

## 2018-11-08 DIAGNOSIS — G47.33 OSA ON CPAP: ICD-10-CM

## 2018-11-08 PROCEDURE — 99214 OFFICE O/P EST MOD 30 MIN: CPT | Performed by: NURSE PRACTITIONER

## 2018-11-08 RX ORDER — RANOLAZINE 500 MG/1
500 TABLET, EXTENDED RELEASE ORAL EVERY 12 HOURS SCHEDULED
Qty: 60 TABLET | Refills: 5 | Status: SHIPPED | OUTPATIENT
Start: 2018-11-08 | End: 2019-01-23 | Stop reason: SDUPTHER

## 2018-11-08 NOTE — PROGRESS NOTES
Subjective   Ricardo Crespo is a 48 y.o. male     Chief Complaint   Patient presents with   • Follow-up     Pt in office for hospital follow up    • Shortness of Breath   • Coronary Artery Disease   • Chest Pain       HPI    Problem List:    1. Coronary artery disease  1.1. Stenting of the LAD, 11/2015, per Dr. Moss.  1.2. Repeat catheterization 2-3 weeks after stenting as above, indicating patent stent with 40% PDA stenosis. Medical management was recommended.  1.3. Repeat catheterization, 01/2016, indicating nonobstructive disease with patent stent. Medical management was recommended.  1.4.  Stenting of the PDA, 03/2017, in the setting of low-level symptoms and abnormal stress test.  Previous stent to the LAD was patent.  The patient had nonobstructive disease otherwise.  Medical management was recommended.  1.5.  Stenting of the mid RCA and PTCA only of the distal LAD, 09/17.  1.6.  Repeat LHC, 11/17, in the setting of acute chest pain.  The patient had 50% LAD stenosis, patent stents, and non-obstructive CAD otherwise.  1. 7 repeat left heart catheter March 2018 with stenting of the LAD ×2.  Nonobstructive disease otherwise and patent stent with medical management recommended  1.8 left heart catheter 10/16/18-30-40% LAD, 40% apical branch, 10% circumflex left, right coronary artery 10%, EF 60%  2. Preserved systolic function  3. Hypertension  4. Dyslipidemia  5. Diabetes mellitus  6. Sleep apnea  7. Reported history of DVT    Patient is a 48-year-old male who presents today after being in the hospital again with left heart catheter.  He says he will have a midsternal chest pain that radiates under his left arm and into his jaw.  He says is a dull pain in the neck become sharp.  He says this past week he's had a take nitroglycerin once daily.  He says it occurs any time and occurs mostly with activity.  He has palpitations over times a day.  He says he does get dizzy with position change.  He denies any  presyncope, syncope, orthopnea or PND.  He does get a little swelling in his right lower extremity.  He says he is short of breath regardless of what he does.  He says he can only go for about an hour in the knees wore out.  Was on Ranexa in the past and it really didn't seem to help however he couldn't afford it.  As far as he recalls nobody ever try to get him qualified for assistance.  He denies any signs of bleeding or cerebral ischemic events.  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    Current Outpatient Prescriptions   Medication Sig Dispense Refill   • clopidogrel (PLAVIX) 75 MG tablet Take 1 tablet by mouth Daily. 30 tablet 11   • ELIQUIS 5 MG tablet tablet Take 1 tablet by mouth 2 (Two) Times a Day. 60 tablet 5   • furosemide (LASIX) 40 MG tablet Take 1 tablet by mouth Daily. 30 tablet 11   • Insulin Glargine (BASAGLAR KWIKPEN) 100 UNIT/ML injection pen Inject 50 Units under the skin 2 (Two) Times a Day.  1   • lithium carbonate 300 MG capsule Take 300 mg by mouth 5 (Five) Times a Day.  2   • nitroglycerin (NITROSTAT) 0.4 MG SL tablet Place 0.4 mg under the tongue Every 5 (Five) Minutes As Needed for Chest Pain.  2   • NOVOLOG MIX 70/30 FLEXPEN (70-30) 100 UNIT/ML suspension pen-injector injection Inject 40 Units under the skin into the appropriate area as directed 2 (Two) Times a Day Before Meals. 40 units daily  2   • sertraline (ZOLOFT) 100 MG tablet Take 200 mg by mouth Daily.  2   • spironolactone (ALDACTONE) 50 MG tablet Take 50 mg by mouth Daily.  1   • ranolazine (RANEXA) 500 MG 12 hr tablet Take 1 tablet by mouth Every 12 (Twelve) Hours. 60 tablet 5     No current facility-administered medications for this visit.        ALLERGIES    Beta adrenergic blockers; Calcium channel blockers; and Imdur [isosorbide dinitrate]    Past Medical History:   Diagnosis Date   • Abnormal ECG    • Alcohol liver damage (CMS/HCC)    • Anxiety    • Arrhythmia    • Atrial fibrillation (CMS/HCC)     • Benign hypertension    • Bipolar depression (CMS/HCC)    • CHF (congestive heart failure) (CMS/HCC)    • Cirrhosis of liver (CMS/HCC)    • Clotting disorder (CMS/HCC)    • Coronary artery disease    • DVT (deep venous thrombosis) (CMS/HCC)     Questionable history of deep venous thrombosis.   • Dyslipidemia    • Hyperlipidemia    • Myocardial infarction (CMS/HCC)     x 2   • Obesity    • JIM on CPAP     Obstructive sleep apnea, compliant with CPAP.    • Type 2 diabetes mellitus (CMS/HCC)    • Valvular disease        Social History     Social History   • Marital status:      Spouse name: N/A   • Number of children: N/A   • Years of education: N/A     Occupational History   • Not on file.     Social History Main Topics   • Smoking status: Former Smoker     Packs/day: 1.00     Years: 15.00     Types: Cigarettes     Start date: 8/1/1985     Quit date: 2008   • Smokeless tobacco: Never Used      Comment: Quit 12 years ago; smoked 17 years at 1 PPD   • Alcohol use No      Comment: Quit 12 years ago; drank a fifth a day for 20 years   • Drug use: No   • Sexual activity: Yes     Partners: Female     Birth control/ protection: None     Other Topics Concern   • Not on file     Social History Narrative   • No narrative on file       Family History   Problem Relation Age of Onset   • Heart disease Father         CAD, 25 year old   • Nephrolithiasis Father    • Heart attack Father    • Atrial fibrillation Mother    • Arrhythmia Mother    • No Known Problems Sister        Review of Systems   Constitutional: Positive for fatigue. Negative for diaphoresis.   HENT: Negative for congestion, rhinorrhea and sore throat.    Eyes: Positive for visual disturbance (glasses daily).   Respiratory: Positive for chest tightness and shortness of breath (Happens regardless of activity, pt states it can last up to 1 hour; with CP and can't really do more than 3 steps at a time).    Cardiovascular: Positive for chest pain (Pt states the  "main radiates under his sternum and to his jaw (left side). dull pain, then becomes sharp; nitro this past week daily; occurs at anytime, will occur with activity ), palpitations (several times a day ) and leg swelling (Right leg edema; little more ).   Gastrointestinal: Negative for abdominal pain, blood in stool, constipation, diarrhea, nausea and vomiting.   Endocrine: Negative for cold intolerance and heat intolerance.        Pt states that he gets clammy and breaks out into a sweat.    Genitourinary: Negative for difficulty urinating, frequency, hematuria and urgency.   Musculoskeletal: Positive for arthralgias. Negative for back pain and neck pain.   Skin: Negative.  Negative for rash and wound.   Allergic/Immunologic: Negative for environmental allergies and food allergies.   Neurological: Positive for dizziness (mainly w/ position changes, but it can happen other times) and numbness (left three fingers on left hand go numb 3x/week). Negative for syncope, weakness, light-headedness and headaches.   Hematological: Bruises/bleeds easily.   Psychiatric/Behavioral: Negative for sleep disturbance (Pt states that he sleeps 2-3 hours per night. States he just doesn't sleep. ).       Objective   /83   Pulse 83   Ht 182.9 cm (72\")   Wt (!) 145 kg (318 lb 9.6 oz)   SpO2 98%   BMI 43.21 kg/m²   Vitals:    11/08/18 0928   BP: 119/83   Pulse: 83   SpO2: 98%   Weight: (!) 145 kg (318 lb 9.6 oz)   Height: 182.9 cm (72\")      Lab Results (most recent)     None        Physical Exam   Constitutional: He is oriented to person, place, and time. Vital signs are normal. He appears well-developed and well-nourished. He is active and cooperative.   HENT:   Head: Normocephalic.   Eyes: Lids are normal.   Neck: Normal carotid pulses, no hepatojugular reflux and no JVD present. Carotid bruit is not present.   Cardiovascular: Normal rate, regular rhythm and normal heart sounds.    Pulses:       Radial pulses are 2+ on the " right side, and 2+ on the left side.        Dorsalis pedis pulses are 2+ on the right side, and 2+ on the left side.        Posterior tibial pulses are 2+ on the right side, and 2+ on the left side.   RLE 1+ edema, no edema LLE; varicose veins & PVD changes RLE.   Pulmonary/Chest: Effort normal and breath sounds normal.   Neurological: He is alert and oriented to person, place, and time.   Skin: Skin is warm, dry and intact.   Psychiatric: He has a normal mood and affect. His speech is normal and behavior is normal. Judgment and thought content normal. Cognition and memory are normal.       Procedure   Procedures         Assessment/Plan      Diagnosis Plan   1. Coronary artery disease involving native coronary artery of native heart without angina pectoris     2. Essential hypertension     3. Hyperlipidemia LDL goal <70     4. Paroxysmal atrial fibrillation (CMS/HCC)     5. JIM on CPAP     6. Chest pain, unspecified type  ranolazine (RANEXA) 500 MG 12 hr tablet   7. Shortness of breath         Return in about 11 weeks (around 1/24/2019).       CAD-patient is on Plavix, but no statin.  Hypertension-patient doing very well.  Hyperlipidemia-we'll need to discuss statin the patient returns or even consider Praluent or Repatha.  Paroxysmal A. fib-patient is on Eliquis.  JIM-patient is compliant with CPAP.  Chest pain-we'll start Ranexa 500 twice a day.  Sent request to [facility] to get patient approved for assistance with his Ranexa.  Shortness of breath-stable.  He will continue his medication regimen.  He'll follow-up in 11 weeks or sooner if any changes.  This will be to reassess his symptoms and discuss statin therapy.      Patient's Body mass index is 43.21 kg/m². BMI is above normal parameters. Recommendations include: educational material.      Electronically signed by:

## 2018-11-08 NOTE — PATIENT INSTRUCTIONS
"Fat and Cholesterol Restricted Diet  Getting too much fat and cholesterol in your diet may cause health problems. Following this diet helps keep your fat and cholesterol at normal levels. This can keep you from getting sick.  What types of fat should I choose?  · Choose monosaturated and polyunsaturated fats. These are found in foods such as olive oil, canola oil, flaxseeds, walnuts, almonds, and seeds.  · Eat more omega-3 fats. Good choices include salmon, mackerel, sardines, tuna, flaxseed oil, and ground flaxseeds.  · Limit saturated fats. These are in animal products such as meats, butter, and cream. They can also be in plant products such as palm oil, palm kernel oil, and coconut oil.  · Avoid foods with partially hydrogenated oils in them. These contain trans fats. Examples of foods that have trans fats are stick margarine, some tub margarines, cookies, crackers, and other baked goods.  What general guidelines do I need to follow?  · Check food labels. Look for the words \"trans fat\" and \"saturated fat.\"  · When preparing a meal:  ? Fill half of your plate with vegetables and green salads.  ? Fill one fourth of your plate with whole grains. Look for the word \"whole\" as the first word in the ingredient list.  ? Fill one fourth of your plate with lean protein foods.  · Eat more foods that have fiber, like apples, carrots, beans, peas, and barley.  · Eat more home-cooked foods. Eat less at restaurants and buffets.  · Limit or avoid alcohol.  · Limit foods high in starch and sugar.  · Limit fried foods.  · Cook foods without frying them. Baking, boiling, grilling, and broiling are all great options.  · Lose weight if you are overweight. Losing even a small amount of weight can help your overall health. It can also help prevent diseases such as diabetes and heart disease.  What foods can I eat?  Grains  Whole grains, such as whole wheat or whole grain breads, crackers, cereals, and pasta. Unsweetened oatmeal, " bulgur, barley, quinoa, or brown rice. Corn or whole wheat flour tortillas.  Vegetables  Fresh or frozen vegetables (raw, steamed, roasted, or grilled). Green salads.  Fruits  All fresh, canned (in natural juice), or frozen fruits.  Meat and Other Protein Products  Ground beef (85% or leaner), grass-fed beef, or beef trimmed of fat. Skinless chicken or turkey. Ground chicken or turkey. Pork trimmed of fat. All fish and seafood. Eggs. Dried beans, peas, or lentils. Unsalted nuts or seeds. Unsalted canned or dry beans.  Dairy  Low-fat dairy products, such as skim or 1% milk, 2% or reduced-fat cheeses, low-fat ricotta or cottage cheese, or plain low-fat yogurt.  Fats and Oils  Tub margarines without trans fats. Light or reduced-fat mayonnaise and salad dressings. Avocado. Olive, canola, sesame, or safflower oils. Natural peanut or almond butter (choose ones without added sugar and oil).  The items listed above may not be a complete list of recommended foods or beverages. Contact your dietitian for more options.  What foods are not recommended?  Grains  White bread. White pasta. White rice. Cornbread. Bagels, pastries, and croissants. Crackers that contain trans fat.  Vegetables  White potatoes. Corn. Creamed or fried vegetables. Vegetables in a cheese sauce.  Fruits  Dried fruits. Canned fruit in light or heavy syrup. Fruit juice.  Meat and Other Protein Products  Fatty cuts of meat. Ribs, chicken wings, valencia, sausage, bologna, salami, chitterlings, fatback, hot dogs, bratwurst, and packaged luncheon meats. Liver and organ meats.  Dairy  Whole or 2% milk, cream, half-and-half, and cream cheese. Whole milk cheeses. Whole-fat or sweetened yogurt. Full-fat cheeses. Nondairy creamers and whipped toppings. Processed cheese, cheese spreads, or cheese curds.  Sweets and Desserts  Corn syrup, sugars, honey, and molasses. Candy. Jam and jelly. Syrup. Sweetened cereals. Cookies, pies, cakes, donuts, muffins, and ice  cream.  Fats and Oils  Butter, stick margarine, lard, shortening, ghee, or valencia fat. Coconut, palm kernel, or palm oils.  Beverages  Alcohol. Sweetened drinks (such as sodas, lemonade, and fruit drinks or punches).  The items listed above may not be a complete list of foods and beverages to avoid. Contact your dietitian for more information.  This information is not intended to replace advice given to you by your health care provider. Make sure you discuss any questions you have with your health care provider.  Document Released: 06/18/2013 Document Revised: 08/24/2017 Document Reviewed: 03/19/2015  [employer] Interactive Patient Education © 2018 [employer]  BMI for Adults  Body mass index (BMI) is a number that is calculated from a person's weight and height. In most adults, the number is used to find how much of an adult's weight is made up of fat. BMI is not as accurate as a direct measure of body fat.  How is BMI calculated?  BMI is calculated by dividing weight in kilograms by height in meters squared. It can also be calculated by dividing weight in pounds by height in inches squared, then multiplying the resulting number by 703. Charts are available to help you find your BMI quickly and easily without doing this calculation.  How is BMI interpreted?  Health care professionals use BMI charts to identify whether an adult is underweight, at a normal weight, or overweight based on the following guidelines:  · Underweight: BMI less than 18.5.  · Normal weight: BMI between 18.5 and 24.9.  · Overweight: BMI between 25 and 29.9.  · Obese: BMI of 30 and above.    BMI is usually interpreted the same for males and females.  Weight includes both fat and muscle, so someone with a muscular build, such as an athlete, may have a BMI that is higher than 24.9. In cases like these, BMI may not accurately depict body fat. To determine if excess body fat is the cause of a BMI of 25 or higher, further assessments may need to be  done by a health care provider.  Why is BMI a useful tool?  BMI is used to identify a possible weight problem that may be related to a medical problem or may increase the risk for medical problems. BMI can also be used to promote changes to reach a healthy weight.  This information is not intended to replace advice given to you by your health care provider. Make sure you discuss any questions you have with your health care provider.  Document Released: 08/29/2005 Document Revised: 04/27/2017 Document Reviewed: 05/15/2015  [employer] Interactive Patient Education © 2018 [employer]

## 2018-11-26 ENCOUNTER — PRIOR AUTHORIZATION (OUTPATIENT)
Dept: CARDIOLOGY | Facility: CLINIC | Age: 48
End: 2018-11-26

## 2019-01-23 ENCOUNTER — OFFICE VISIT (OUTPATIENT)
Dept: CARDIOLOGY | Facility: CLINIC | Age: 49
End: 2019-01-23

## 2019-01-23 VITALS
BODY MASS INDEX: 41.45 KG/M2 | WEIGHT: 306 LBS | HEART RATE: 71 BPM | HEIGHT: 72 IN | SYSTOLIC BLOOD PRESSURE: 125 MMHG | DIASTOLIC BLOOD PRESSURE: 82 MMHG | OXYGEN SATURATION: 95 %

## 2019-01-23 DIAGNOSIS — G47.33 OSA ON CPAP: ICD-10-CM

## 2019-01-23 DIAGNOSIS — R06.02 SHORTNESS OF BREATH: ICD-10-CM

## 2019-01-23 DIAGNOSIS — I48.0 PAROXYSMAL ATRIAL FIBRILLATION (HCC): ICD-10-CM

## 2019-01-23 DIAGNOSIS — Z00.00 HEALTHCARE MAINTENANCE: ICD-10-CM

## 2019-01-23 DIAGNOSIS — R07.9 CHEST PAIN, UNSPECIFIED TYPE: Primary | ICD-10-CM

## 2019-01-23 DIAGNOSIS — Z99.89 OSA ON CPAP: ICD-10-CM

## 2019-01-23 DIAGNOSIS — E78.5 HYPERLIPIDEMIA LDL GOAL <70: ICD-10-CM

## 2019-01-23 DIAGNOSIS — I25.10 CORONARY ARTERY DISEASE INVOLVING NATIVE CORONARY ARTERY OF NATIVE HEART WITHOUT ANGINA PECTORIS: ICD-10-CM

## 2019-01-23 DIAGNOSIS — R00.2 PALPITATIONS: ICD-10-CM

## 2019-01-23 DIAGNOSIS — I10 ESSENTIAL HYPERTENSION: ICD-10-CM

## 2019-01-23 PROCEDURE — 93000 ELECTROCARDIOGRAM COMPLETE: CPT | Performed by: NURSE PRACTITIONER

## 2019-01-23 PROCEDURE — 99214 OFFICE O/P EST MOD 30 MIN: CPT | Performed by: NURSE PRACTITIONER

## 2019-01-23 RX ORDER — RANOLAZINE 1000 MG/1
1000 TABLET, EXTENDED RELEASE ORAL EVERY 12 HOURS SCHEDULED
Qty: 60 TABLET | Refills: 11 | Status: SHIPPED | OUTPATIENT
Start: 2019-01-23 | End: 2019-08-20 | Stop reason: SDUPTHER

## 2019-01-23 NOTE — PROGRESS NOTES
Subjective   Ricardo Crespo is a 48 y.o. male     Chief Complaint   Patient presents with   • Follow-up     Pt in office for 11wk follow up        HPI    Problem List:    1. Coronary artery disease  1.1. Stenting of the LAD, 11/2015, per Dr. Moss.  1.2. Repeat catheterization 2-3 weeks after stenting as above, indicating patent stent with 40% PDA stenosis. Medical management was recommended.  1.3. Repeat catheterization, 01/2016, indicating nonobstructive disease with patent stent. Medical management was recommended.  1.4.  Stenting of the PDA, 03/2017, in the setting of low-level symptoms and abnormal stress test.  Previous stent to the LAD was patent.  The patient had nonobstructive disease otherwise.  Medical management was recommended.  1.5.  Stenting of the mid RCA and PTCA only of the distal LAD, 09/17.  1.6.  Repeat LHC, 11/17, in the setting of acute chest pain.  The patient had 50% LAD stenosis, patent stents, and non-obstructive CAD otherwise.  1. 7 repeat left heart catheter March 2018 with stenting of the LAD ×2.  Nonobstructive disease otherwise and patent stent with medical management recommended  1.8 left heart catheter 10/16/18-30-40% LAD, 40% apical branch, 10% circumflex left, right coronary artery 10%, EF 60%  2. Preserved systolic function  3. Hypertension  4. Dyslipidemia  5. Diabetes mellitus  6. Sleep apnea  7. Reported history of DVT, Eliquis   8. Atrial Fibrillation [id number] = 2, on Eliquis     Patient is a -year-old male who presents today for a follow-up.  He says he was doing great on the Ranexa until yesterday.  He says he had two episodes of chest pain are probably some of the worse pain that he had.  He states he was probably even more so when he has previous heart attacks.  He says that he does not recall any fluttering.  It felt like there was a band around his chest.  He says it went into his jaw as well as into his left arm.  He said he became very nauseated, lightheaded,  diaphoretic, short of breath and very fatigued even through today.  He said he took 4 nitroglycerin during each episode.  He says the first episode lasted about 20 minutes and the second episode about 30 minutes.  They were 7 hours apart roughly.  He says that his wife did take him to the ER after the second one but the symptoms resolved so he just left.  He says sometimes he will going to A. fib but he says his heart has been very good and he had any problems for some time.  He says he will get dizzy at times and when he does he will get nauseated and sweaty.  He denies any presyncope, syncope, orthopnea or PND.  He does get swelling mostly just in the right leg which is the like he had the DVT previously.  He says he is short of breath with activity and rest.  He denies any signs of bleeding or cerebral ischemic events.    Current Outpatient Medications   Medication Sig Dispense Refill   • clopidogrel (PLAVIX) 75 MG tablet Take 1 tablet by mouth Daily. 30 tablet 11   • ELIQUIS 5 MG tablet tablet Take 1 tablet by mouth 2 (Two) Times a Day. 60 tablet 5   • furosemide (LASIX) 40 MG tablet Take 1 tablet by mouth Daily. 30 tablet 11   • Insulin Glargine (BASAGLAR KWIKPEN) 100 UNIT/ML injection pen Inject 50 Units under the skin 2 (Two) Times a Day.  1   • lithium carbonate 300 MG capsule Take 300 mg by mouth 5 (Five) Times a Day.  2   • nitroglycerin (NITROSTAT) 0.4 MG SL tablet Place 0.4 mg under the tongue Every 5 (Five) Minutes As Needed for Chest Pain.  2   • NOVOLOG MIX 70/30 FLEXPEN (70-30) 100 UNIT/ML suspension pen-injector injection Inject 40 Units under the skin into the appropriate area as directed 2 (Two) Times a Day Before Meals. 40 units daily  2   • ranolazine (RANEXA) 1000 MG 12 hr tablet Take 1 tablet by mouth Every 12 (Twelve) Hours. 60 tablet 11   • sertraline (ZOLOFT) 100 MG tablet Take 200 mg by mouth Daily.  2   • spironolactone (ALDACTONE) 50 MG tablet Take 50 mg by mouth Daily.  1     No current  facility-administered medications for this visit.        ALLERGIES    Beta adrenergic blockers; Calcium channel blockers; and Imdur [isosorbide dinitrate]    Past Medical History:   Diagnosis Date   • Abnormal ECG    • Alcohol liver damage (CMS/HCC)    • Anxiety    • Arrhythmia    • Atrial fibrillation (CMS/HCC)    • Benign hypertension    • Bipolar depression (CMS/HCC)    • CHF (congestive heart failure) (CMS/HCC)    • Cirrhosis of liver (CMS/HCC)    • Clotting disorder (CMS/HCC)    • Coronary artery disease    • DVT (deep venous thrombosis) (CMS/HCC)     Questionable history of deep venous thrombosis.   • Dyslipidemia    • Hyperlipidemia    • Myocardial infarction (CMS/HCC)     x 2   • Obesity    • JIM on CPAP     Obstructive sleep apnea, compliant with CPAP.    • Type 2 diabetes mellitus (CMS/HCC)    • Valvular disease        Social History     Socioeconomic History   • Marital status:      Spouse name: Not on file   • Number of children: Not on file   • Years of education: Not on file   • Highest education level: Not on file   Social Needs   • Financial resource strain: Not on file   • Food insecurity - worry: Not on file   • Food insecurity - inability: Not on file   • Transportation needs - medical: Not on file   • Transportation needs - non-medical: Not on file   Occupational History   • Not on file   Tobacco Use   • Smoking status: Former Smoker     Packs/day: 1.00     Years: 15.00     Pack years: 15.00     Types: Cigarettes     Start date: 1985     Last attempt to quit: 2008     Years since quittin.0   • Smokeless tobacco: Never Used   • Tobacco comment: Quit 12 years ago; smoked 17 years at 1 PPD   Substance and Sexual Activity   • Alcohol use: No     Comment: Quit 12 years ago; drank a fifth a day for 20 years   • Drug use: No   • Sexual activity: Yes     Partners: Female     Birth control/protection: None   Other Topics Concern   • Not on file   Social History Narrative   • Not on file  "      Family History   Problem Relation Age of Onset   • Heart disease Father         CAD, 25 year old   • Nephrolithiasis Father    • Heart attack Father    • Atrial fibrillation Mother    • Arrhythmia Mother    • No Known Problems Sister        Review of Systems   Constitutional: Positive for diaphoresis (was sweaty all over during CP ) and fatigue (still wore out from CP yesterday ).   HENT: Negative for congestion, rhinorrhea and sore throat.    Eyes: Positive for visual disturbance.   Respiratory: Positive for chest tightness and shortness of breath (at rest and w/ activity ).    Cardiovascular: Positive for chest pain (PT states he's had two episodes recently, states it felt like a band was around his chest and he had jaw pain. States he looked grey. States \"it's the worst feeling i've had in three years or so\"  ), palpitations (goes into afib sometimes, but been very good; not sure if he had any palpitations with it yesterday ) and leg swelling (RLE edema ).   Gastrointestinal: Positive for nausea (with CP ). Negative for abdominal pain, constipation, diarrhea and vomiting.   Endocrine: Negative for cold intolerance and heat intolerance.   Genitourinary: Negative for difficulty urinating, dysuria, frequency and urgency.   Musculoskeletal: Positive for arthralgias and back pain. Negative for neck pain.   Skin: Negative for rash and wound.   Allergic/Immunologic: Negative for environmental allergies and food allergies.   Neurological: Positive for dizziness (states it comes w/ nausea and sweating ), light-headedness (with CP ) and numbness (left arm and three fingers- only happens when he has CP ). Negative for syncope, weakness and headaches.   Hematological: Bruises/bleeds easily.   Psychiatric/Behavioral: Positive for sleep disturbance (Pt states he doesn't sleep ).       Objective   /82   Pulse 71   Ht 182.9 cm (72\")   Wt (!) 139 kg (306 lb)   SpO2 95%   BMI 41.50 kg/m²   Vitals:    01/23/19 0909 " "  BP: 125/82   Pulse: 71   SpO2: 95%   Weight: (!) 139 kg (306 lb)   Height: 182.9 cm (72\")      Lab Results (most recent)     None        Physical Exam   Constitutional: He is oriented to person, place, and time. Vital signs are normal. He appears well-developed and well-nourished. He is active and cooperative.   HENT:   Head: Normocephalic.   Eyes: Lids are normal.   Neck: Normal carotid pulses, no hepatojugular reflux and no JVD present. Carotid bruit is not present.   Cardiovascular: Normal rate, regular rhythm and normal heart sounds.   Pulses:       Radial pulses are 2+ on the right side, and 2+ on the left side.        Dorsalis pedis pulses are 2+ on the right side, and 2+ on the left side.        Posterior tibial pulses are 2+ on the right side, and 2+ on the left side.   RLE PVD changes a nd varicose veins, nonpitting edema; no edema LLE.   Pulmonary/Chest: Effort normal and breath sounds normal.   Abdominal: Normal appearance and bowel sounds are normal.   Neurological: He is alert and oriented to person, place, and time.   Skin: Skin is warm, dry and intact.   Psychiatric: He has a normal mood and affect. His speech is normal and behavior is normal. Judgment and thought content normal. Cognition and memory are normal.       Procedure     ECG 12 Lead  Date/Time: 1/23/2019 1:09 PM  Performed by: Jing Salas APRN  Authorized by: Jing Salas APRN   Comparison: compared with previous ECG from 6/27/2018  Rhythm: sinus rhythm  Rate: normal  BPM: 66  Conduction: incomplete RBBB  T flattening: V3 and V4 (nonspecific changes )  QRS axis: left  Clinical impression: abnormal ECG                 Assessment/Plan      Diagnosis Plan   1. Chest pain, unspecified type  Troponin    CK-MB    Cardiac catheterization    ranolazine (RANEXA) 1000 MG 12 hr tablet   2. Coronary artery disease involving native coronary artery of native heart without angina pectoris  Cardiac catheterization   3. Essential hypertension  " Cardiac catheterization    CBC (No Diff)    Basic Metabolic Panel   4. Hyperlipidemia LDL goal <70  Cardiac catheterization   5. Paroxysmal atrial fibrillation (CMS/HCC)  Cardiac Event Monitor    Cardiac catheterization   6. JIM on CPAP  Cardiac catheterization   7. Shortness of breath  Troponin    CK-MB    Cardiac catheterization   8. Palpitations  Cardiac Event Monitor    Cardiac catheterization   9. Healthcare maintenance  CBC (No Diff)    Basic Metabolic Panel       Return for After testing.    Chest pain/CAD/hypertension/hyperlipidemia/A. fib/shortness of breath/palpitations-patient will have left heart catheter.  He will be troponin, CK-MB, BMP and CBC today.  He will use nitroglycerin when necessary for chest pain no resolution he will go to the ER.  I will increase his Ranexa to 1000 milligrams twice a day.  He will continue his medication regimen otherwise.  I did advise if his cardiac markers are elevated we will call and advised him to go to the hospital otherwise we will proceed with left heart catheter as scheduled.  Will follow-up after left heart catheter or sooner if any changes.  I also ordered a heart monitor to make sure that he is not having episodes of A. fib that would be causing his symptoms.     Patient's Body mass index is 41.5 kg/m². BMI is above normal parameters. Recommendations include: educational material.      Electronically signed by:

## 2019-01-23 NOTE — PATIENT INSTRUCTIONS
"Fat and Cholesterol Restricted Diet  Getting too much fat and cholesterol in your diet may cause health problems. Following this diet helps keep your fat and cholesterol at normal levels. This can keep you from getting sick.  What types of fat should I choose?  · Choose monosaturated and polyunsaturated fats. These are found in foods such as olive oil, canola oil, flaxseeds, walnuts, almonds, and seeds.  · Eat more omega-3 fats. Good choices include salmon, mackerel, sardines, tuna, flaxseed oil, and ground flaxseeds.  · Limit saturated fats. These are in animal products such as meats, butter, and cream. They can also be in plant products such as palm oil, palm kernel oil, and coconut oil.  · Avoid foods with partially hydrogenated oils in them. These contain trans fats. Examples of foods that have trans fats are stick margarine, some tub margarines, cookies, crackers, and other baked goods.  What general guidelines do I need to follow?  · Check food labels. Look for the words \"trans fat\" and \"saturated fat.\"  · When preparing a meal:  ? Fill half of your plate with vegetables and green salads.  ? Fill one fourth of your plate with whole grains. Look for the word \"whole\" as the first word in the ingredient list.  ? Fill one fourth of your plate with lean protein foods.  · Eat more foods that have fiber, like apples, carrots, beans, peas, and barley.  · Eat more home-cooked foods. Eat less at restaurants and buffets.  · Limit or avoid alcohol.  · Limit foods high in starch and sugar.  · Limit fried foods.  · Cook foods without frying them. Baking, boiling, grilling, and broiling are all great options.  · Lose weight if you are overweight. Losing even a small amount of weight can help your overall health. It can also help prevent diseases such as diabetes and heart disease.  What foods can I eat?  Grains  Whole grains, such as whole wheat or whole grain breads, crackers, cereals, and pasta. Unsweetened oatmeal, " bulgur, barley, quinoa, or brown rice. Corn or whole wheat flour tortillas.  Vegetables  Fresh or frozen vegetables (raw, steamed, roasted, or grilled). Green salads.  Fruits  All fresh, canned (in natural juice), or frozen fruits.  Meat and Other Protein Products  Ground beef (85% or leaner), grass-fed beef, or beef trimmed of fat. Skinless chicken or turkey. Ground chicken or turkey. Pork trimmed of fat. All fish and seafood. Eggs. Dried beans, peas, or lentils. Unsalted nuts or seeds. Unsalted canned or dry beans.  Dairy  Low-fat dairy products, such as skim or 1% milk, 2% or reduced-fat cheeses, low-fat ricotta or cottage cheese, or plain low-fat yogurt.  Fats and Oils  Tub margarines without trans fats. Light or reduced-fat mayonnaise and salad dressings. Avocado. Olive, canola, sesame, or safflower oils. Natural peanut or almond butter (choose ones without added sugar and oil).  The items listed above may not be a complete list of recommended foods or beverages. Contact your dietitian for more options.  What foods are not recommended?  Grains  White bread. White pasta. White rice. Cornbread. Bagels, pastries, and croissants. Crackers that contain trans fat.  Vegetables  White potatoes. Corn. Creamed or fried vegetables. Vegetables in a cheese sauce.  Fruits  Dried fruits. Canned fruit in light or heavy syrup. Fruit juice.  Meat and Other Protein Products  Fatty cuts of meat. Ribs, chicken wings, valencia, sausage, bologna, salami, chitterlings, fatback, hot dogs, bratwurst, and packaged luncheon meats. Liver and organ meats.  Dairy  Whole or 2% milk, cream, half-and-half, and cream cheese. Whole milk cheeses. Whole-fat or sweetened yogurt. Full-fat cheeses. Nondairy creamers and whipped toppings. Processed cheese, cheese spreads, or cheese curds.  Sweets and Desserts  Corn syrup, sugars, honey, and molasses. Candy. Jam and jelly. Syrup. Sweetened cereals. Cookies, pies, cakes, donuts, muffins, and ice  cream.  Fats and Oils  Butter, stick margarine, lard, shortening, ghee, or valencia fat. Coconut, palm kernel, or palm oils.  Beverages  Alcohol. Sweetened drinks (such as sodas, lemonade, and fruit drinks or punches).  The items listed above may not be a complete list of foods and beverages to avoid. Contact your dietitian for more information.  This information is not intended to replace advice given to you by your health care provider. Make sure you discuss any questions you have with your health care provider.  Document Released: 06/18/2013 Document Revised: 08/24/2017 Document Reviewed: 03/19/2015  [employer] Patient Education © 2018 [employer]  BMI for Adults  Body mass index (BMI) is a number that is calculated from a person's weight and height. In most adults, the number is used to find how much of an adult's weight is made up of fat. BMI is not as accurate as a direct measure of body fat.  How is BMI calculated?  BMI is calculated by dividing weight in kilograms by height in meters squared. It can also be calculated by dividing weight in pounds by height in inches squared, then multiplying the resulting number by 703. Charts are available to help you find your BMI quickly and easily without doing this calculation.  How is BMI interpreted?  Health care professionals use BMI charts to identify whether an adult is underweight, at a normal weight, or overweight based on the following guidelines:  · Underweight: BMI less than 18.5.  · Normal weight: BMI between 18.5 and 24.9.  · Overweight: BMI between 25 and 29.9.  · Obese: BMI of 30 and above.    BMI is usually interpreted the same for males and females.  Weight includes both fat and muscle, so someone with a muscular build, such as an athlete, may have a BMI that is higher than 24.9. In cases like these, BMI may not accurately depict body fat. To determine if excess body fat is the cause of a BMI of 25 or higher, further assessments may need to be  done by a health care provider.  Why is BMI a useful tool?  BMI is used to identify a possible weight problem that may be related to a medical problem or may increase the risk for medical problems. BMI can also be used to promote changes to reach a healthy weight.  This information is not intended to replace advice given to you by your health care provider. Make sure you discuss any questions you have with your health care provider.  Document Released: 08/29/2005 Document Revised: 04/27/2017 Document Reviewed: 05/15/2015  [employer] Interactive Patient Education © 2018 [employer]    Coronary Angiogram With Stent  Coronary angiogram with stent placement is a procedure to widen or open a narrow blood vessel of the heart (coronary artery). Arteries may become blocked by cholesterol buildup (plaques) in the lining of the wall. When a coronary artery becomes partially blocked, blood flow to that area decreases. This may lead to chest pain or a heart attack (myocardial infarction).  A stent is a small piece of metal that looks like mesh or a spring. Stent placement may be done as treatment for a heart attack or right after a coronary angiogram in which a blocked artery is found.  Let your health care provider know about:  · Any allergies you have.  · All medicines you are taking, including vitamins, herbs, eye drops, creams, and over-the-counter medicines.  · Any problems you or family members have had with anesthetic medicines.  · Any blood disorders you have.  · Any surgeries you have had.  · Any medical conditions you have.  · Whether you are pregnant or may be pregnant.  What are the risks?  Generally, this is a safe procedure. However, problems may occur, including:  · Damage to the heart or its blood vessels.  · A return of blockage.  · Bleeding, infection, or bruising at the insertion site.  · A collection of blood under the skin (hematoma) at the insertion site.  · A blood clot in another part of the  body.  · Kidney injury.  · Allergic reaction to the dye or contrast that is used.  · Bleeding into the abdomen (retroperitoneal bleeding).    What happens before the procedure?  Staying hydrated  Follow instructions from your health care provider about hydration, which may include:  · Up to 2 hours before the procedure - you may continue to drink clear liquids, such as water, clear fruit juice, black coffee, and plain tea.    Eating and drinking restrictions  Follow instructions from your health care provider about eating and drinking, which may include:  · 8 hours before the procedure - stop eating heavy meals or foods such as meat, fried foods, or fatty foods.  · 6 hours before the procedure - stop eating light meals or foods, such as toast or cereal.  · 2 hours before the procedure - stop drinking clear liquids.    Ask your health care provider about:  · Changing or stopping your regular medicines. This is especially important if you are taking diabetes medicines or blood thinners.  · Taking medicines such as ibuprofen. These medicines can thin your blood. Do not take these medicines before your procedure if your health care provider instructs you not to. Generally, aspirin is recommended before a procedure of passing a small, thin tube (catheter) through a blood vessel and into the heart (cardiac catheterization).    What happens during the procedure?  · An IV tube will be inserted into one of your veins.  · You will be given one or more of the following:  ? A medicine to help you relax (sedative).  ? A medicine to numb the area where the catheter will be inserted into an artery (local anesthetic).  · To reduce your risk of infection:  ? Your health care team will wash or sanitize their hands.  ? Your skin will be washed with soap.  ? Hair may be removed from the area where the catheter will be inserted.  · Using a guide wire, the catheter will be inserted into an artery. The location may be in your groin, in  your wrist, or in the fold of your arm (near your elbow).  · A type of X-ray (fluoroscopy) will be used to help guide the catheter to the opening of the arteries in the heart.  · A dye will be injected into the catheter, and X-rays will be taken. The dye will help to show where any narrowing or blockages are located in the arteries.  · A tiny wire will be guided to the blocked spot, and a balloon will be inflated to make the artery wider.  · The stent will be expanded and will crush the plaques into the wall of the vessel. The stent will hold the area open and improve the blood flow. Most stents have a drug coating to reduce the risk of the stent narrowing over time.  · The artery may be made wider using a drill, laser, or other tools to remove plaques.  · When the blood flow is better, the catheter will be removed. The lining of the artery will grow over the stent, which stays where it was placed.  This procedure may vary among health care providers and hospitals.  What happens after the procedure?  · If the procedure is done through the leg, you will be kept in bed lying flat for about 6 hours. You will be instructed to not bend and not cross your legs.  · The insertion site will be checked frequently.  · The pulse in your foot or wrist will be checked frequently.  · You may have additional blood tests, X-rays, and a test that records the electrical activity of your heart (electrocardiogram, or ECG).  This information is not intended to replace advice given to you by your health care provider. Make sure you discuss any questions you have with your health care provider.  Document Released: 06/23/2004 Document Revised: 08/17/2017 Document Reviewed: 07/23/2017  [employer] Interactive Patient Education © 2018 [employer]

## 2019-01-24 ENCOUNTER — TELEPHONE (OUTPATIENT)
Dept: CARDIOLOGY | Facility: CLINIC | Age: 49
End: 2019-01-24

## 2019-01-24 NOTE — TELEPHONE ENCOUNTER
----- Message from KRISTEN De La Torre sent at 1/24/2019  8:13 AM EST -----  Can you advise patient of labs.  Let him know HGB and Platelets are a little low, will forward to PCP, however, cardiac markers were WNL.  The BMP and CBC are for his [facility].  Thanks!.  I am forwarding to his PCP as well

## 2019-01-30 ENCOUNTER — TELEPHONE (OUTPATIENT)
Dept: CARDIOLOGY | Facility: CLINIC | Age: 49
End: 2019-01-30

## 2019-01-30 NOTE — TELEPHONE ENCOUNTER
----- Message from Kari Mchugh sent at 1/29/2019  4:45 PM EST -----   Leanna- I went to call this pt's LHC in to [facility] and they said that the 3pm slot for the day we had him scheduled is booked by 2 other cardiologists. I scheduled him for the same day at 11am because that was the opening they had to keep him on that day. I've not spoken with Geo about his new time though. Just wanted to give you a heads up. Thanks!

## 2019-01-30 NOTE — TELEPHONE ENCOUNTER
Pt's appt had to be changed to 11am, so he will need to arrive by 9am. All other info on pt's cath paperwork is correct for him to follow.       Informed pt of the above message. He read back the new date and time to me and understanding to be there at 9am.

## 2019-02-05 ENCOUNTER — TELEPHONE (OUTPATIENT)
Dept: CARDIOLOGY | Facility: CLINIC | Age: 49
End: 2019-02-05

## 2019-02-05 NOTE — TELEPHONE ENCOUNTER
Per chart review, pt is scheduled for heart cath on 2/11/19.          First attempt to reach pt. Son answered the phone and stated that pt is unavailable. Stated he will have pt call back. I requested that he select the option for the  and ask for me, as his voicemail wasn't audible, he verbalized understanding.

## 2019-02-05 NOTE — TELEPHONE ENCOUNTER
----- Message from Leanna Murry sent at 2/5/2019 12:06 PM EST -----  Pt LVM stating that he's supposed to have a heart cath. Rest of message was inaudible.
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
## 2019-02-06 NOTE — TELEPHONE ENCOUNTER
Per Jing: can move pt to this Friday at 10am, arrive at 8am.       Informed pt of the above, he agreed to change of appt and verbalized understanding of when to arrive.

## 2019-02-08 ENCOUNTER — OUTSIDE FACILITY SERVICE (OUTPATIENT)
Dept: CARDIOLOGY | Facility: CLINIC | Age: 49
End: 2019-02-08

## 2019-02-08 PROCEDURE — 93458 L HRT ARTERY/VENTRICLE ANGIO: CPT | Performed by: INTERNAL MEDICINE

## 2019-02-21 ENCOUNTER — TELEPHONE (OUTPATIENT)
Dept: CARDIOLOGY | Facility: CLINIC | Age: 49
End: 2019-02-21

## 2019-02-21 NOTE — TELEPHONE ENCOUNTER
Pt called stating that someone from here called him twice yesterday. Stated one call in am and one in pm. Stated that his little boy answered the second time, no name was left.   Per chart review, nothing is documented.   I apologized for the confusion and stated that I was unsure who called. Stated that I would ask around and see. Stated for him to call me if he needs anything, he verbalized understanding.

## 2019-02-25 ENCOUNTER — OFFICE VISIT (OUTPATIENT)
Dept: CARDIOLOGY | Facility: CLINIC | Age: 49
End: 2019-02-25

## 2019-02-25 VITALS
DIASTOLIC BLOOD PRESSURE: 75 MMHG | HEART RATE: 72 BPM | BODY MASS INDEX: 42.66 KG/M2 | SYSTOLIC BLOOD PRESSURE: 115 MMHG | HEIGHT: 72 IN | WEIGHT: 315 LBS | OXYGEN SATURATION: 99 %

## 2019-02-25 DIAGNOSIS — I10 ESSENTIAL HYPERTENSION: ICD-10-CM

## 2019-02-25 DIAGNOSIS — R53.82 CHRONIC FATIGUE: ICD-10-CM

## 2019-02-25 DIAGNOSIS — I25.10 CORONARY ARTERY DISEASE INVOLVING NATIVE CORONARY ARTERY OF NATIVE HEART WITHOUT ANGINA PECTORIS: ICD-10-CM

## 2019-02-25 DIAGNOSIS — R42 LIGHTHEADEDNESS: ICD-10-CM

## 2019-02-25 DIAGNOSIS — R07.2 PRECORDIAL PAIN: ICD-10-CM

## 2019-02-25 DIAGNOSIS — K85.90 ACUTE PANCREATITIS, UNSPECIFIED COMPLICATION STATUS, UNSPECIFIED PANCREATITIS TYPE: ICD-10-CM

## 2019-02-25 DIAGNOSIS — R06.02 SHORTNESS OF BREATH: ICD-10-CM

## 2019-02-25 DIAGNOSIS — I48.0 PAROXYSMAL ATRIAL FIBRILLATION (HCC): ICD-10-CM

## 2019-02-25 DIAGNOSIS — I50.32 CHRONIC DIASTOLIC CONGESTIVE HEART FAILURE (HCC): Primary | ICD-10-CM

## 2019-02-25 DIAGNOSIS — E78.5 HYPERLIPIDEMIA LDL GOAL <70: ICD-10-CM

## 2019-02-25 DIAGNOSIS — M54.9 ACUTE BACK PAIN, UNSPECIFIED BACK LOCATION, UNSPECIFIED BACK PAIN LATERALITY: ICD-10-CM

## 2019-02-25 DIAGNOSIS — G47.33 OSA ON CPAP: ICD-10-CM

## 2019-02-25 DIAGNOSIS — R00.2 PALPITATION: ICD-10-CM

## 2019-02-25 DIAGNOSIS — R73.9 HYPERGLYCEMIA: ICD-10-CM

## 2019-02-25 DIAGNOSIS — Z99.89 OSA ON CPAP: ICD-10-CM

## 2019-02-25 DIAGNOSIS — R42 DIZZINESS: ICD-10-CM

## 2019-02-25 PROCEDURE — 99214 OFFICE O/P EST MOD 30 MIN: CPT | Performed by: INTERNAL MEDICINE

## 2019-02-25 RX ORDER — ATORVASTATIN CALCIUM 40 MG/1
40 TABLET, FILM COATED ORAL NIGHTLY
Qty: 30 TABLET | Refills: 11 | Status: SHIPPED | OUTPATIENT
Start: 2019-02-25 | End: 2019-08-20 | Stop reason: SDUPTHER

## 2019-02-25 NOTE — PATIENT INSTRUCTIONS
Obesity, Adult  Obesity is the condition of having too much total body fat. Being overweight or obese means that your weight is greater than what is considered healthy for your body size. Obesity is determined by a measurement called BMI. BMI is an estimate of body fat and is calculated from height and weight. For adults, a BMI of 30 or higher is considered obese.  Obesity can eventually lead to other health concerns and major illnesses, including:  · Stroke.  · Coronary artery disease (CAD).  · Type 2 diabetes.  · Some types of cancer, including cancers of the colon, breast, uterus, and gallbladder.  · Osteoarthritis.  · High blood pressure (hypertension).  · High cholesterol.  · Sleep apnea.  · Gallbladder stones.  · Infertility problems.    What are the causes?  The main cause of obesity is taking in (consuming) more calories than your body uses for energy. Other factors that contribute to this condition may include:  · Being born with genes that make you more likely to become obese.  · Having a medical condition that causes obesity. These conditions include:  ? Hypothyroidism.  ? Polycystic ovarian syndrome (PCOS).  ? Binge-eating disorder.  ? Cushing syndrome.  · Taking certain medicines, such as steroids, antidepressants, and seizure medicines.  · Not being physically active (sedentary lifestyle).  · Living where there are limited places to exercise safely or buy healthy foods.  · Not getting enough sleep.    What increases the risk?  The following factors may increase your risk of this condition:  · Having a family history of obesity.  · Being a woman of -American descent.  · Being a man of  descent.    What are the signs or symptoms?  Having excessive body fat is the main symptom of this condition.  How is this diagnosed?  This condition may be diagnosed based on:  · Your symptoms.  · Your medical history.  · A physical exam. Your health care provider may measure:  ? Your BMI. If you are an  adult with a BMI between 25 and less than 30, you are considered overweight. If you are an adult with a BMI of 30 or higher, you are considered obese.  ? The distances around your hips and your waist (circumferences). These may be compared to each other to help diagnose your condition.  ? Your skinfold thickness. Your health care provider may gently pinch a fold of your skin and measure it.    How is this treated?  Treatment for this condition often includes changing your lifestyle. Treatment may include some or all of the following:  · Dietary changes. Work with your health care provider and a dietitian to set a weight-loss goal that is healthy and reasonable for you. Dietary changes may include eating:  ? Smaller portions. A portion size is the amount of a particular food that is healthy for you to eat at one time. This varies from person to person.  ? Low-calorie or low-fat options.  ? More whole grains, fruits, and vegetables.  · Regular physical activity. This may include aerobic activity (cardio) and strength training.  · Medicine to help you lose weight. Your health care provider may prescribe medicine if you are unable to lose 1 pound a week after 6 weeks of eating more healthily and doing more physical activity.  · Surgery. Surgical options may include gastric banding and gastric bypass. Surgery may be done if:  ? Other treatments have not helped to improve your condition.  ? You have a BMI of 40 or higher.  ? You have life-threatening health problems related to obesity.    Follow these instructions at home:    Eating and drinking    · Follow recommendations from your health care provider about what you eat and drink. Your health care provider may advise you to:  ? Limit fast foods, sweets, and processed snack foods.  ? Choose low-fat options, such as low-fat milk instead of whole milk.  ? Eat 5 or more servings of fruits or vegetables every day.  ? Eat at home more often. This gives you more control over  what you eat.  ? Choose healthy foods when you eat out.  ? Learn what a healthy portion size is.  ? Keep low-fat snacks on hand.  ? Avoid sugary drinks, such as soda, fruit juice, iced tea sweetened with sugar, and flavored milk.  ? Eat a healthy breakfast.  · Drink enough water to keep your urine clear or pale yellow.  · Do not go without eating for long periods of time (do not fast) or follow a fad diet. Fasting and fad diets can be unhealthy and even dangerous.  Physical Activity  · Exercise regularly, as told by your health care provider. Ask your health care provider what types of exercise are safe for you and how often you should exercise.  · Warm up and stretch before being active.  · Cool down and stretch after being active.  · Rest between periods of activity.  Lifestyle  · Limit the time that you spend in front of your TV, computer, or video game system.  · Find ways to reward yourself that do not involve food.  · Limit alcohol intake to no more than 1 drink a day for nonpregnant women and 2 drinks a day for men. One drink equals 12 oz of beer, 5 oz of wine, or 1½ oz of hard liquor.  General instructions  · Keep a weight loss journal to keep track of the food you eat and how much you exercise you get.  · Take over-the-counter and prescription medicines only as told by your health care provider.  · Take vitamins and supplements only as told by your health care provider.  · Consider joining a support group. Your health care provider may be able to recommend a support group.  · Keep all follow-up visits as told by your health care provider. This is important.  Contact a health care provider if:  · You are unable to meet your weight loss goal after 6 weeks of dietary and lifestyle changes.  This information is not intended to replace advice given to you by your health care provider. Make sure you discuss any questions you have with your health care provider.  Document Released: 01/25/2006 Document Revised:  05/22/2017 Document Reviewed: 10/05/2016  [employer] Interactive Patient Education © 2018 [employer]  MyPlate from [employer]  The general, healthful diet is based on the 2010 Dietary Guidelines for Americans. The amount of food you need to eat from each food group depends on your age, sex, and level of physical activity and can be individualized by a dietitian. Go to ChooseMyPlate.gov for more information.  What do I need to know about the MyPlate plan?  · Enjoy your food, but eat less.  · Avoid oversized portions.  ? ½ of your plate should include fruits and vegetables.  ? ¼ of your plate should be grains.  ? ¼ of your plate should be protein.  Grains  · Make at least half of your grains whole grains.  · For a 2,000 calorie daily food plan, eat 6 oz every day.  · 1 oz is about 1 slice bread, 1 cup cereal, or ½ cup cooked rice, cereal, or pasta.  Vegetables  · Make half your plate fruits and vegetables.  · For a 2,000 calorie daily food plan, eat 2½ cups every day.  · 1 cup is about 1 cup raw or cooked vegetables or vegetable juice or 2 cups raw leafy greens.  Fruits  · Make half your plate fruits and vegetables.  · For a 2,000 calorie daily food plan, eat 2 cups every day.  · 1 cup is about 1 cup fruit or 100% fruit juice or ½ cup dried fruit.  Protein  · For a 2,000 calorie daily food plan, eat 5½ oz every day.  · 1 oz is about 1 oz meat, poultry, or fish, ¼ cup cooked beans, 1 egg, 1 Tbsp peanut butter, or ½ oz nuts or seeds.  Dairy  · Switch to fat-free or low-fat (1%) milk.  · For a 2,000 calorie daily food plan, eat 3 cups every day.  · 1 cup is about 1 cup milk or yogurt or soy milk (soy beverage), 1½ oz natural cheese, or 2 oz processed cheese.  Fats, Oils, and Empty Calories  · Only small amounts of oils are recommended.  · Empty calories are calories from solid fats or added sugars.  · Compare sodium in foods like soup, bread, and frozen meals. Choose the foods with lower numbers.  · Drink water instead of  sugary drinks.  What foods can I eat?  Grains  Whole grains such as whole wheat, quinoa, millet, and bulgur. Bread, rolls, and pasta made from whole grains. Brown or wild rice. Hot or cold cereals made from whole grains and without added sugar.  Vegetables  All fresh vegetables, especially fresh red, dark green, or orange vegetables. Peas and beans. Low-sodium frozen or canned vegetables prepared without added salt. Low-sodium vegetable juices.  Fruits  All fresh, frozen, and dried fruits. Canned fruit packed in water or fruit juice without added sugar. Fruit juices without added sugar.  Meats and Other Protein Sources  Boiled, baked, or grilled lean meat trimmed of fat. Skinless poultry. Fresh seafood and shellfish. Canned seafood packed in water. Unsalted nuts and unsalted nut butters. Tofu. Dried beans and pea. Eggs.  Dairy  Low-fat or fat-free milk, yogurt, and cheeses.  Sweets and Desserts  Frozen desserts made from low-fat milk.  Fats and Oils  Olive, peanut, and canola oils and margarine. Salad dressing and mayonnaise made from these oils.  Other  Soups and casseroles made from allowed ingredients and without added fat or salt.  The items listed above may not be a complete list of recommended foods or beverages. Contact your dietitian for more options.  What foods are not recommended?  Grains  Sweetened, low-fiber cereals. Packaged baked goods. Snack crackers and chips. Cheese crackers, butter crackers, and biscuits. Frozen waffles, sweet breads, doughnuts, pastries, packaged baking mixes, pancakes, cakes, and cookies.  Vegetables  Regular canned or frozen vegetables or vegetables prepared with salt. Canned tomatoes. Canned tomato sauce. Fried vegetables. Vegetables in cream sauce or cheese sauce.  Fruits  Fruits packed in syrup or made with added sugar.  Meats and Other Protein Sources  Marbled or fatty meats such as ribs. Poultry with skin. Fried meats, poultry, eggs, or fish. Sausages, hot dogs, and deli  meats such as pastrami, bologna, or salami.  Dairy  Whole milk, cream, cheeses made from whole milk, sour cream. Ice cream or yogurt made from whole milk or with added sugar.  Beverages  For adults, no more than one alcoholic drink per day. Regular soft drinks or other sugary beverages. Juice drinks.  Sweets and Desserts  Sugary or fatty desserts, candy, and other sweets.  Fats and Oils  Solid shortening or partially hydrogenated oils. Solid margarine. Margarine that contains trans fats. Butter.  The items listed above may not be a complete list of foods and beverages to avoid. Contact your dietitian for more information.  This information is not intended to replace advice given to you by your health care provider. Make sure you discuss any questions you have with your health care provider.  Document Released: 01/06/2009 Document Revised: 05/25/2017 Document Reviewed: 11/26/2014  [employer] Interactive Patient Education © 2018 [employer].

## 2019-02-25 NOTE — PROGRESS NOTES
Subjective   Ricardo Crespo is a 48 y.o. male     Chief Complaint   Patient presents with   • Coronary Artery Disease     Here for heart cath. f/u   • Hypertension   • Hyperlipidemia   • Palpitations   • Atrial Fibrillation   • Congestive Heart Failure   • Sleep Apnea   • Diabetes       PROBLEM LIST:       1. Coronary artery disease  1.1. Stenting of the LAD, 11/2015, per Dr. Moss.  1.2. Repeat catheterization 2-3 weeks after stenting as above, indicating patent stent with 40% PDA stenosis. Medical management was recommended.  1.3. Repeat catheterization, 01/2016, indicating nonobstructive disease with patent stent. Medical management was recommended.  1.4.  Stenting of the PDA, 03/2017, in the setting of low-level symptoms and abnormal stress test.  Previous stent to the LAD was patent.  The patient had nonobstructive disease otherwise.  Medical management was recommended.  1.5.  Stenting of the mid RCA and PTCA only of the distal LAD, 09/17.  1.6.  Repeat LHC, 11/17, in the setting of acute chest pain.  The patient had 50% LAD stenosis, patent stents, and non-obstructive CAD otherwise.  1. 7 repeat left heart catheter March 2018 with stenting of the LAD ×2.  Nonobstructive disease otherwise and patent stent with medical management recommended  1.8 left heart catheter 10/16/18-30-40% LAD, 40% apical branch, 10% circumflex left, right coronary artery 10%, EF 60%  1.9 LHC, 2-8-19, EF 55-60%, demonstrated widely patent epicardial coronary arteries with diffuse non-obstructive disease with empiric therapy for ischemia and risk factor manag.  2. Preserved systolic function  3. Hypertension  4. Dyslipidemia  5. Diabetes mellitus  6. Sleep apnea  7. Reported history of DVT, Eliquis   8. Atrial Fibrillation [id number] = 2, on Eliquis             Specialty Problems        Cardiology Problems    Coronary artery disease involving native coronary artery of native heart without angina pectoris        Essential hypertension         Hyperlipidemia LDL goal <70        Palpitation        Chronic diastolic congestive heart failure (CMS/HCC)        Paroxysmal atrial fibrillation (CMS/HCC)                HPI:  Mr. Crespo returns for follow-up after recent cardiac catheterization.    That study demonstrated widely patent stents with the only angiographically significant stenosis being in the very distal LAD at the apex where there is 80% or greater stenosis and a 1-1.5 mm vessel.  LV systolic function was preserved, and diastolic pressure was proximally 26.    I asked Mr. Crespo to describe his chest pain and.  He has an intrascapular pain which radiates to the mid sternal area.  This is accompanied by nausea, diaphoresis, but not necessarily shortness of breath.  However, Mr. Crespo describes severe exertional dyspnea at class 2-3 levels of activity.  He has no orthopnea or PND, edema is unchanged.                      CURRENT MEDICATION:    Current Outpatient Medications   Medication Sig Dispense Refill   • ELIQUIS 5 MG tablet tablet Take 1 tablet by mouth 2 (Two) Times a Day. 60 tablet 5   • furosemide (LASIX) 40 MG tablet Take 1 tablet by mouth Daily. 30 tablet 11   • Insulin Glargine (BASAGLAR KWIKPEN) 100 UNIT/ML injection pen Inject 50 Units under the skin 2 (Two) Times a Day.  1   • lithium carbonate 300 MG capsule Take 300 mg by mouth 5 (Five) Times a Day.  2   • nitroglycerin (NITROSTAT) 0.4 MG SL tablet Place 0.4 mg under the tongue Every 5 (Five) Minutes As Needed for Chest Pain.  2   • NOVOLOG MIX 70/30 FLEXPEN (70-30) 100 UNIT/ML suspension pen-injector injection Inject 40 Units under the skin into the appropriate area as directed 2 (Two) Times a Day Before Meals. 40 units daily  2   • ranolazine (RANEXA) 1000 MG 12 hr tablet Take 1 tablet by mouth Every 12 (Twelve) Hours. 60 tablet 11   • sertraline (ZOLOFT) 100 MG tablet Take 200 mg by mouth Daily.  2   • spironolactone (ALDACTONE) 50 MG tablet Take 50 mg by mouth Daily.  1      No current facility-administered medications for this visit.        ALLERGIES:    Beta adrenergic blockers; Calcium channel blockers; and Imdur [isosorbide dinitrate]    PAST MEDICAL HISTORY:    Past Medical History:   Diagnosis Date   • Abnormal ECG    • Alcohol liver damage (CMS/HCC)    • Anxiety    • Arrhythmia    • Atrial fibrillation (CMS/HCC)    • Benign hypertension    • Bipolar depression (CMS/HCC)    • CHF (congestive heart failure) (CMS/HCC)    • Cirrhosis of liver (CMS/HCC)    • Clotting disorder (CMS/HCC)    • Coronary artery disease    • DVT (deep venous thrombosis) (CMS/HCC)     Questionable history of deep venous thrombosis.   • Dyslipidemia    • Hyperlipidemia    • Myocardial infarction (CMS/HCC)     x 2   • Obesity    • JIM on CPAP     Obstructive sleep apnea, compliant with CPAP.    • Type 2 diabetes mellitus (CMS/HCC)    • Valvular disease        SURGICAL HISTORY:    Past Surgical History:   Procedure Laterality Date   • ANAL FISTULA REPAIR     • CARDIAC CATHETERIZATION     • CARDIAC CATHETERIZATION     • CAROTID STENT     • CHOLECYSTECTOMY     • CORONARY ANGIOPLASTY WITH STENT PLACEMENT Left 11/09/2015    Persistent cardiac symptoms with cardiac catheterization, Dr. Moss, with PTCA and stenting of the mid-LAD, 11/09/2015.   • GANGLION CYST EXCISION      left knee   • PYLOROMYOTOMY         SOCIAL HISTORY:    Social History     Socioeconomic History   • Marital status:      Spouse name: Not on file   • Number of children: Not on file   • Years of education: Not on file   • Highest education level: Not on file   Social Needs   • Financial resource strain: Not on file   • Food insecurity - worry: Not on file   • Food insecurity - inability: Not on file   • Transportation needs - medical: Not on file   • Transportation needs - non-medical: Not on file   Occupational History   • Not on file   Tobacco Use   • Smoking status: Former Smoker     Packs/day: 1.00     Years: 15.00     Pack years:  "15.00     Types: Cigarettes     Start date: 1985     Last attempt to quit: 2008     Years since quittin.1   • Smokeless tobacco: Never Used   • Tobacco comment: Quit 12 years ago; smoked 17 years at 1 PPD   Substance and Sexual Activity   • Alcohol use: No     Comment: Quit 12 years ago; drank a fifth a day for 20 years   • Drug use: No   • Sexual activity: Yes     Partners: Female     Birth control/protection: None   Other Topics Concern   • Not on file   Social History Narrative   • Not on file       FAMILY HISTORY:    Family History   Problem Relation Age of Onset   • Heart disease Father         CAD, 25 year old   • Nephrolithiasis Father    • Heart attack Father    • Atrial fibrillation Mother    • Arrhythmia Mother    • No Known Problems Sister        Review of Systems   Constitutional: Positive for fatigue.   HENT: Negative.    Eyes: Positive for visual disturbance (glasses prn).   Respiratory: Positive for shortness of breath.    Cardiovascular: Positive for chest pain, palpitations and leg swelling (rt.).   Gastrointestinal: Negative.    Endocrine: Negative.    Genitourinary: Negative.    Musculoskeletal: Positive for arthralgias and myalgias.   Skin: Negative.    Allergic/Immunologic: Negative.    Neurological: Positive for dizziness.   Hematological: Negative.    Psychiatric/Behavioral: Negative.        VISIT VITALS:  Vitals:    19 0943   BP: 115/75   BP Location: Left arm   Patient Position: Sitting   Pulse: 72   SpO2: 99%   Weight: (!) 143 kg (315 lb 12.8 oz)   Height: 182.9 cm (72.01\")      /75 (BP Location: Left arm, Patient Position: Sitting)   Pulse 72   Ht 182.9 cm (72.01\")   Wt (!) 143 kg (315 lb 12.8 oz)   SpO2 99%   BMI 42.82 kg/m²     RECENT LABS:    Objective       Physical Exam    Procedures      Assessment/Plan   #1.  Coronary artery disease.  The patient has stable anatomy by recent catheter with patent stents and the only significant disease being in the LAD at " the apex.  I doubt that ischemia related to epicardial coronary disease is a major contributing factor to the patient's dyspnea.    #2.  I had a detailed discussion with Mr. Crespo reference diastolic dysfunction, creased LV filling pressures, weight, and physical deconditioning all his contributing factors to his exertional dyspnea.  Mr. Crespo has already lost a significant amount of weight.  He will continue in that regard.  He also make efforts at increasing his physical capacity.    #3.  With regard to chest pain, I wonder if the patient has a GI etiology.  He has documented although mild esophagitis, hiatal hernia, and he has a history of recurrent pancreatitis.  We will check amylase and lipase today.    #4.  Therapeutically, the patient might benefit from the addition of aspirin to his current regimen.  He will start 81 mg daily in the absence of active bleeding.  Then is currently asymptomatic of TIA or stroke.      #5.  I also has a detailed discussion with the patient about the benefit of statin in the setting of diabetes and known coronary disease.  Because of problems with myalgias and arthralgias with prior statins we will start coenzyme Q10 and tonic water titrated to tolerance and affect.  Then will check fasting lipid profile liver enzymes in Dr. Bush's office in 6-8 weeks, and we'll see him in follow-up as instructed.    #6.  We'll plan on seeing Mr. Crespo in follow-up in 6 months or on a when necessary basis for symptoms or medication intolerance as discussed in detail today.    #5.  Mr. Crespo will follow-up with Dr. ANDERSON Wallis as instructed, and in our office in 6 months or on a when necessary basis for symptoms or medication intolerances discussed in detail today.  I will check fasting lipid profile liver enzymes in Dr. ANDERSON Wallis's office   Diagnosis Plan   1. Chronic diastolic congestive heart failure (CMS/HCC)     2. Coronary artery disease involving native coronary artery of native heart  without angina pectoris     3. Essential hypertension     4. Hyperlipidemia LDL goal <70     5. Palpitation     6. Paroxysmal atrial fibrillation (CMS/HCC)     7. JIM on CPAP     8. Shortness of breath     9. Precordial pain     10. Dizziness     11. Lightheadedness         No Follow-up on file.              Patient's Body mass index is 42.82 kg/m². BMI is above normal parameters. Recommendations include: educational material and referral to primary care.       Taya Gonzalez LPN    Scribed for Dr. eLo Ramirez by Taya Gonzalez LPN February 25, 2019 9:52 AM         Electronically signed by:            This note is dictated utilizing voice recognition software.  Although this record has been proof read, transcriptional errors may still be present. If questions occur regarding the content of this record please do not hesitate to call our office.

## 2019-03-13 DIAGNOSIS — I10 ESSENTIAL HYPERTENSION: ICD-10-CM

## 2019-03-13 DIAGNOSIS — I48.0 PAROXYSMAL ATRIAL FIBRILLATION (HCC): ICD-10-CM

## 2019-03-13 DIAGNOSIS — I25.118 CORONARY ARTERY DISEASE OF NATIVE ARTERY OF NATIVE HEART WITH STABLE ANGINA PECTORIS (HCC): ICD-10-CM

## 2019-03-13 RX ORDER — CLOPIDOGREL BISULFATE 75 MG/1
TABLET ORAL
Qty: 30 TABLET | Refills: 11 | OUTPATIENT
Start: 2019-03-13

## 2019-04-22 RX ORDER — CLOPIDOGREL BISULFATE 75 MG/1
TABLET ORAL
Qty: 30 TABLET | Refills: 0 | OUTPATIENT
Start: 2019-04-22

## 2019-05-01 ENCOUNTER — TELEPHONE (OUTPATIENT)
Dept: CARDIOLOGY | Facility: CLINIC | Age: 49
End: 2019-05-01

## 2019-05-01 NOTE — TELEPHONE ENCOUNTER
Can you follow-up and make sure he went to ER.   They will call to move his appt up sooner if he did go.  Thanks!

## 2019-05-01 NOTE — TELEPHONE ENCOUNTER
Regarding: RE:Heart monitor results  Contact: [phone]  ----- Message from Mychart, Generic sent at 5/1/2019 10:27 AM EDT -----    diana Ram i am very sorry to bother you.  About 2 a.m. I started experiencing chest pain it starts out dull but gets worse like all the way to an 8 eventually goes back to a 2 or 3  Very tired.  I might need to make an appointment.  ----- Message -----  From: MADONNA RIVERS  Sent: 2/18/2019  4:34 PM EST  To: Ricardo Crespo  Subject: Heart monitor results  Good afternoon, Geo!    We received your heart monitor results. Your baseline heart rate was 71.8 beats per minute; which is good, not too high, not too low. There were no critical or serious events that were picked up. It did show some premature beats, but nothing serious. Jing will explain everything in depth with you at your follow up appointment on 4/1/19 at 1:30pm.     Have a great rest of your day!  SAILAJA Armstrong

## 2019-05-17 DIAGNOSIS — I25.10 CORONARY ARTERY DISEASE DUE TO CALCIFIED CORONARY LESION: ICD-10-CM

## 2019-05-17 DIAGNOSIS — I25.84 CORONARY ARTERY DISEASE DUE TO CALCIFIED CORONARY LESION: ICD-10-CM

## 2019-05-17 RX ORDER — APIXABAN 5 MG/1
TABLET, FILM COATED ORAL
Qty: 60 TABLET | Refills: 5 | Status: ON HOLD | OUTPATIENT
Start: 2019-05-17 | End: 2021-01-27

## 2019-07-01 DIAGNOSIS — I50.32 CHRONIC DIASTOLIC CONGESTIVE HEART FAILURE (HCC): ICD-10-CM

## 2019-07-01 RX ORDER — SPIRONOLACTONE 50 MG/1
TABLET, FILM COATED ORAL
Qty: 90 TABLET | Refills: 1 | OUTPATIENT
Start: 2019-07-01

## 2019-07-08 RX ORDER — SPIRONOLACTONE 50 MG/1
50 TABLET, FILM COATED ORAL DAILY
Qty: 90 TABLET | Refills: 1 | Status: ON HOLD | OUTPATIENT
Start: 2019-07-08 | End: 2021-01-27

## 2019-07-19 RX ORDER — FUROSEMIDE 40 MG/1
TABLET ORAL
Qty: 30 TABLET | Refills: 11 | Status: ON HOLD | OUTPATIENT
Start: 2019-07-19 | End: 2021-01-27

## 2019-08-20 ENCOUNTER — HOSPITAL ENCOUNTER (OUTPATIENT)
Dept: GENERAL RADIOLOGY | Facility: HOSPITAL | Age: 49
Discharge: HOME | End: 2019-08-20
Admitting: UROLOGY

## 2019-08-20 ENCOUNTER — OFFICE VISIT (OUTPATIENT)
Dept: UROLOGY | Facility: CLINIC | Age: 49
End: 2019-08-20

## 2019-08-20 VITALS
DIASTOLIC BLOOD PRESSURE: 78 MMHG | BODY MASS INDEX: 41.31 KG/M2 | SYSTOLIC BLOOD PRESSURE: 138 MMHG | HEIGHT: 72 IN | WEIGHT: 305 LBS

## 2019-08-20 DIAGNOSIS — R35.0 FREQUENCY OF MICTURITION: ICD-10-CM

## 2019-08-20 DIAGNOSIS — N20.0 KIDNEY STONE: Primary | ICD-10-CM

## 2019-08-20 LAB
BILIRUB BLD-MCNC: NEGATIVE MG/DL
CLARITY, POC: CLEAR
COLOR UR: YELLOW
GLUCOSE UR STRIP-MCNC: ABNORMAL MG/DL
KETONES UR QL: NEGATIVE
LEUKOCYTE EST, POC: NEGATIVE
NITRITE UR-MCNC: NEGATIVE MG/ML
PH UR: 7 [PH] (ref 5–8)
PROT UR STRIP-MCNC: NEGATIVE MG/DL
RBC # UR STRIP: ABNORMAL /UL
SP GR UR: 1 (ref 1–1.03)
UROBILINOGEN UR QL: NORMAL

## 2019-08-20 PROCEDURE — 74019 RADEX ABDOMEN 2 VIEWS: CPT | Performed by: RADIOLOGY

## 2019-08-20 PROCEDURE — 99203 OFFICE O/P NEW LOW 30 MIN: CPT | Performed by: UROLOGY

## 2019-08-20 PROCEDURE — 74018 RADEX ABDOMEN 1 VIEW: CPT

## 2019-08-20 RX ORDER — OXYCODONE HYDROCHLORIDE AND ACETAMINOPHEN 5; 325 MG/1; MG/1
TABLET ORAL
Qty: 28 TABLET | Refills: 0 | Status: SHIPPED | OUTPATIENT
Start: 2019-08-20 | End: 2019-08-26 | Stop reason: SDUPTHER

## 2019-08-20 RX ORDER — INSULIN GLARGINE 100 [IU]/ML
40 INJECTION, SOLUTION SUBCUTANEOUS DAILY
Refills: 3 | COMMUNITY
Start: 2019-08-16 | End: 2021-09-16

## 2019-08-20 RX ORDER — ALBUTEROL SULFATE 90 UG/1
AEROSOL, METERED RESPIRATORY (INHALATION) AS NEEDED
COMMUNITY
Start: 2019-08-19 | End: 2020-09-30

## 2019-08-20 RX ORDER — RANOLAZINE 500 MG/1
1 TABLET, EXTENDED RELEASE ORAL 2 TIMES DAILY
COMMUNITY
Start: 2019-07-01 | End: 2019-08-20

## 2019-08-20 RX ORDER — BUPROPION HYDROCHLORIDE 150 MG/1
150 TABLET, EXTENDED RELEASE ORAL 2 TIMES DAILY
Refills: 1 | COMMUNITY
Start: 2019-06-17 | End: 2019-08-20

## 2019-08-20 RX ORDER — IPRATROPIUM BROMIDE AND ALBUTEROL SULFATE 2.5; .5 MG/3ML; MG/3ML
SOLUTION RESPIRATORY (INHALATION) AS NEEDED
COMMUNITY
Start: 2019-08-19 | End: 2019-09-10

## 2019-08-20 RX ORDER — DILTIAZEM HYDROCHLORIDE 120 MG/1
1 CAPSULE, COATED, EXTENDED RELEASE ORAL DAILY
COMMUNITY
Start: 2019-07-01 | End: 2019-08-20

## 2019-08-20 RX ORDER — TAMSULOSIN HYDROCHLORIDE 0.4 MG/1
1 CAPSULE ORAL NIGHTLY
Qty: 30 CAPSULE | Refills: 11 | Status: SHIPPED | OUTPATIENT
Start: 2019-08-20 | End: 2020-09-30

## 2019-08-20 NOTE — PROGRESS NOTES
Chief Complaint:          Right flank pain    HPI:   49 y.o. male.  Pt had pain started 3 days ago.  He is a recurrent stone former.  HPI      Past Medical History:        Past Medical History:   Diagnosis Date   • Abnormal ECG    • Alcohol liver damage (CMS/HCC)    • Anxiety    • Arrhythmia    • Atrial fibrillation (CMS/HCC)    • Benign hypertension    • Bipolar depression (CMS/HCC)    • CHF (congestive heart failure) (CMS/HCC)    • Cirrhosis of liver (CMS/HCC)    • Clotting disorder (CMS/HCC)    • Coronary artery disease    • DVT (deep venous thrombosis) (CMS/HCC)     Questionable history of deep venous thrombosis.   • Dyslipidemia    • Hyperlipidemia    • Myocardial infarction (CMS/HCC)     x 2   • Obesity    • JIM on CPAP     Obstructive sleep apnea, compliant with CPAP.    • Type 2 diabetes mellitus (CMS/HCC)    • Valvular disease          Current Meds:     Current Outpatient Medications   Medication Sig Dispense Refill   • albuterol sulfate  (90 Base) MCG/ACT inhaler As Needed.     • aspirin 81 MG tablet Take 1 tablet by mouth Daily. 30 tablet 11   • ELIQUIS 5 MG tablet tablet TAKE 1 TABLET TWO TIMES A DAY AS DIRECTED BY YOUR PRESCRIBER FOR BLOOD THINNER 60 tablet 5   • furosemide (LASIX) 40 MG tablet TAKE 1 TABLET EVERY MORNING FOR FLUID AND BLOOD PRESSURE 30 tablet 11   • Insulin Glargine (BASAGLAR KWIKPEN) 100 UNIT/ML injection pen   3   • ipratropium-albuterol (DUO-NEB) 0.5-2.5 mg/3 ml nebulizer As Needed.     • lithium carbonate 300 MG capsule Take 300 mg by mouth 5 (Five) Times a Day.  2   • nitroglycerin (NITROSTAT) 0.4 MG SL tablet Place 0.4 mg under the tongue Every 5 (Five) Minutes As Needed for Chest Pain.  2   • NOVOLOG MIX 70/30 FLEXPEN (70-30) 100 UNIT/ML suspension pen-injector injection Inject 40 Units under the skin into the appropriate area as directed 2 (Two) Times a Day Before Meals. 40 units daily  2   • sertraline (ZOLOFT) 100 MG tablet Take 200 mg by mouth Daily.  2   •  spironolactone (ALDACTONE) 50 MG tablet Take 1 tablet by mouth Daily. 90 tablet 1   • oxyCODONE-acetaminophen (PERCOCET) 5-325 MG per tablet 1 to 2 Tablets Every 6 Hours as needed for PAIN 28 tablet 0   • tamsulosin (FLOMAX) 0.4 MG capsule 24 hr capsule Take 1 capsule by mouth Every Night. 30 capsule 11     No current facility-administered medications for this visit.         Allergies:      Allergies   Allergen Reactions   • Beta Adrenergic Blockers Other (See Comments)     profound blood pressure drops.   • Calcium Channel Blockers Other (See Comments)     profound blood pressure drops.   • Imdur [Isosorbide Dinitrate] Other (See Comments)     Headaches  Patient states his blood pressure drops profoundly?        Past Surgical History:     Past Surgical History:   Procedure Laterality Date   • ANAL FISTULA REPAIR     • CARDIAC CATHETERIZATION     • CARDIAC CATHETERIZATION     • CAROTID STENT     • CHOLECYSTECTOMY     • CORONARY ANGIOPLASTY WITH STENT PLACEMENT Left 2015    Persistent cardiac symptoms with cardiac catheterization, Dr. Moss, with PTCA and stenting of the mid-LAD, 2015.   • GANGLION CYST EXCISION      left knee   • PYLOROMYOTOMY           Social History:     Social History     Socioeconomic History   • Marital status:      Spouse name: Not on file   • Number of children: Not on file   • Years of education: Not on file   • Highest education level: Not on file   Tobacco Use   • Smoking status: Former Smoker     Packs/day: 1.00     Years: 15.00     Pack years: 15.00     Types: Cigarettes     Start date: 1985     Last attempt to quit: 2008     Years since quittin.6   • Smokeless tobacco: Never Used   • Tobacco comment: Quit 12 years ago; smoked 17 years at 1 PPD   Substance and Sexual Activity   • Alcohol use: No     Comment: Quit 12 years ago; drank a fifth a day for 20 years   • Drug use: No   • Sexual activity: Yes     Partners: Female     Birth control/protection: None        Family History:     Family History   Problem Relation Age of Onset   • Heart disease Father         CAD, 25 year old   • Nephrolithiasis Father    • Heart attack Father    • Atrial fibrillation Mother    • Arrhythmia Mother    • No Known Problems Sister        Review of Systems:     Review of Systems   Constitutional: Positive for chills and fatigue. Negative for fever.   HENT: Negative for sinus pressure and sinus pain.    Respiratory: Negative for cough.    Cardiovascular: Negative for leg swelling.   Gastrointestinal: Positive for nausea. Negative for abdominal pain.   Genitourinary: Positive for dysuria, flank pain, frequency, hematuria and urgency.   Musculoskeletal: Negative for back pain.   Neurological: Negative for headaches.   Psychiatric/Behavioral: The patient is nervous/anxious.          Physical Exam:     Physical Exam   Constitutional: He is oriented to person, place, and time.   Morbidly obese   HENT:   Head: Normocephalic and atraumatic.   Right Ear: External ear normal.   Left Ear: External ear normal.   Nose: Nose normal.   Mouth/Throat: Oropharynx is clear and moist.   Eyes: Conjunctivae and EOM are normal. Pupils are equal, round, and reactive to light.   Neck: Normal range of motion. Neck supple. No thyromegaly present.   Cardiovascular: Normal rate, regular rhythm, normal heart sounds and intact distal pulses.   No murmur heard.  Pulmonary/Chest: Effort normal and breath sounds normal. No respiratory distress. He has no wheezes. He has no rales. He exhibits no tenderness.   Abdominal: Soft. Bowel sounds are normal. He exhibits no distension and no mass. There is no tenderness. No hernia.   Genitourinary: Penis normal.   Musculoskeletal: Normal range of motion. He exhibits no edema or tenderness.   Lymphadenopathy:     He has no cervical adenopathy.   Neurological: He is alert and oriented to person, place, and time. No cranial nerve deficit. He exhibits normal muscle tone. Coordination  "normal.   Skin: Skin is warm. No rash noted.   Psychiatric: He has a normal mood and affect. His behavior is normal. Judgment and thought content normal.   Nursing note and vitals reviewed.      /78 (BP Location: Left arm, Patient Position: Sitting, Cuff Size: Large Adult)   Ht 182.9 cm (72.01\")   Wt (!) 138 kg (305 lb)   BMI 41.35 kg/m²    Procedure:         Assessment:     Encounter Diagnoses   Name Primary?   • Kidney stone Yes   • Frequency of micturition        Orders Placed This Encounter   Procedures   • XR Abdomen KUB     Order Specific Question:   Reason for Exam:     Answer:   kidney stone   • CT Abdomen Pelvis Stone Protocol     Standing Status:   Future     Standing Expiration Date:   8/19/2020     Order Specific Question:   Will Oral Contrast be needed for this procedure?     Answer:   No   • POC Urinalysis Dipstick, Automated       Plan:   Pt has done litholink in 2015.  His kub today did not show stones but will check stone study ct scan.  Rx percocet and flomax.  He usually passes stones.  Will see him back in 3 weeks.  Will repeat litholink.    Patient's Body mass index is 41.35 kg/m². BMI is above normal parameters. Recommendations include: educational material.      Smoking Cessation Counseling:  Former smoker.  Patient does not currently use any tobacco products.    Pt stopped smoking 15 years ago.    Counseling was given to patient for the following topics instructions for management as follows: stones. The interim medical history and current results were reviewed.  A treatment plan with follow-up was made for Kidney stone [N20.0].   This document has been electronically signed by Brady Beal MD August 20, 2019 9:41 AM      "

## 2019-08-26 ENCOUNTER — OFFICE VISIT (OUTPATIENT)
Dept: UROLOGY | Facility: CLINIC | Age: 49
End: 2019-08-26

## 2019-08-26 ENCOUNTER — TELEPHONE (OUTPATIENT)
Dept: UROLOGY | Facility: CLINIC | Age: 49
End: 2019-08-26

## 2019-08-26 ENCOUNTER — OFFICE VISIT (OUTPATIENT)
Dept: CARDIOLOGY | Facility: CLINIC | Age: 49
End: 2019-08-26

## 2019-08-26 VITALS
OXYGEN SATURATION: 99 % | BODY MASS INDEX: 41.04 KG/M2 | HEART RATE: 70 BPM | WEIGHT: 303 LBS | HEIGHT: 72 IN | SYSTOLIC BLOOD PRESSURE: 120 MMHG | DIASTOLIC BLOOD PRESSURE: 78 MMHG

## 2019-08-26 VITALS — HEIGHT: 72 IN | BODY MASS INDEX: 41.69 KG/M2 | WEIGHT: 307.8 LBS

## 2019-08-26 DIAGNOSIS — I50.32 CHRONIC DIASTOLIC CONGESTIVE HEART FAILURE (HCC): Primary | ICD-10-CM

## 2019-08-26 DIAGNOSIS — I10 ESSENTIAL HYPERTENSION: ICD-10-CM

## 2019-08-26 DIAGNOSIS — I48.0 PAROXYSMAL ATRIAL FIBRILLATION (HCC): ICD-10-CM

## 2019-08-26 DIAGNOSIS — N20.1 LEFT URETERAL CALCULUS: Primary | ICD-10-CM

## 2019-08-26 DIAGNOSIS — R07.2 PRECORDIAL PAIN: ICD-10-CM

## 2019-08-26 DIAGNOSIS — G47.33 OSA ON CPAP: ICD-10-CM

## 2019-08-26 DIAGNOSIS — E78.5 HYPERLIPIDEMIA LDL GOAL <70: ICD-10-CM

## 2019-08-26 DIAGNOSIS — Z99.89 OSA ON CPAP: ICD-10-CM

## 2019-08-26 DIAGNOSIS — R06.02 SHORTNESS OF BREATH: ICD-10-CM

## 2019-08-26 DIAGNOSIS — I25.10 CORONARY ARTERY DISEASE INVOLVING NATIVE CORONARY ARTERY OF NATIVE HEART WITHOUT ANGINA PECTORIS: ICD-10-CM

## 2019-08-26 DIAGNOSIS — R42 LIGHTHEADEDNESS: ICD-10-CM

## 2019-08-26 DIAGNOSIS — R42 DIZZINESS: ICD-10-CM

## 2019-08-26 DIAGNOSIS — R00.2 PALPITATION: ICD-10-CM

## 2019-08-26 PROCEDURE — 93000 ELECTROCARDIOGRAM COMPLETE: CPT | Performed by: INTERNAL MEDICINE

## 2019-08-26 PROCEDURE — 99214 OFFICE O/P EST MOD 30 MIN: CPT | Performed by: INTERNAL MEDICINE

## 2019-08-26 PROCEDURE — 99213 OFFICE O/P EST LOW 20 MIN: CPT | Performed by: UROLOGY

## 2019-08-26 RX ORDER — OXYCODONE AND ACETAMINOPHEN 10; 325 MG/1; MG/1
1 TABLET ORAL EVERY 6 HOURS PRN
Qty: 12 TABLET | Refills: 0 | Status: SHIPPED | OUTPATIENT
Start: 2019-08-26 | End: 2019-09-10

## 2019-08-26 RX ORDER — OXYCODONE HYDROCHLORIDE AND ACETAMINOPHEN 5; 325 MG/1; MG/1
1 TABLET ORAL EVERY 6 HOURS PRN
COMMUNITY
End: 2019-09-10

## 2019-08-26 RX ORDER — LOSARTAN POTASSIUM 25 MG/1
25 TABLET ORAL DAILY
Qty: 30 TABLET | Refills: 11 | Status: SHIPPED | OUTPATIENT
Start: 2019-08-26 | End: 2020-09-30

## 2019-08-26 NOTE — TELEPHONE ENCOUNTER
Notified pt - Dr Beal is out of the office, pt stated that Dr Beal wanted for him to see Dr Baez if he was having complications in passing the stones. Transferred call to front.

## 2019-08-26 NOTE — PROGRESS NOTES
Chief Complaint:          Chief Complaint   Patient presents with   • Nephrolithiasis     f/u requesting pain meds ([facility] patient)       HPI:   49 y.o. male patient of Dr. Brady Beal with a small left distal stone he is trying to pass it.  He is requesting pain medication.  Ureteral calculus-patient has been diagnosed with a ureteral calculus.  We have discussed the various parameters regarding spontaneous passage including the notion that a 2 mm stone has a high likelihood of spontaneous passage versus a larger stone being caught up in the upper areas of the urinary tract.  We also discussed the medical management of stone disease and the use of medical expulsive therapy in the form of Flomax.  This is used in an off label setting.  We discussed the indicators for intervention including  absolute indicators such as sepsis and uncontrollable severe pain as well as  the relative indicators of moderate pain that is well-controlled with various analgesia.  I also talked about nonoperative management including ambulation and increasing fluids and hot tub as being an effective adjuncts in the treatment of a ureteral stone.      Past Medical History:        Past Medical History:   Diagnosis Date   • Abnormal ECG    • Alcohol liver damage (CMS/HCC)    • Anxiety    • Arrhythmia    • Atrial fibrillation (CMS/HCC)    • Benign hypertension    • Bipolar depression (CMS/HCC)    • CHF (congestive heart failure) (CMS/HCC)    • Cirrhosis of liver (CMS/HCC)    • Clotting disorder (CMS/HCC)    • Coronary artery disease    • DVT (deep venous thrombosis) (CMS/HCC)     Questionable history of deep venous thrombosis.   • Dyslipidemia    • Hyperlipidemia    • Myocardial infarction (CMS/HCC)     x 2   • Obesity    • JIM on CPAP     Obstructive sleep apnea, compliant with CPAP.    • Type 2 diabetes mellitus (CMS/HCC)    • Valvular disease          Current Meds:     Current Outpatient Medications   Medication Sig Dispense Refill   •  albuterol sulfate  (90 Base) MCG/ACT inhaler As Needed.     • aspirin 81 MG tablet Take 1 tablet by mouth Daily. 30 tablet 11   • ELIQUIS 5 MG tablet tablet TAKE 1 TABLET TWO TIMES A DAY AS DIRECTED BY YOUR PRESCRIBER FOR BLOOD THINNER 60 tablet 5   • Evolocumab with Infusor (REPATHA PUSHTRONEX SYSTEM) 420 MG/3.5ML solution cartridge Inject 420 mg under the skin into the appropriate area as directed Every 30 (Thirty) Days. 1 cartridge. 11   • furosemide (LASIX) 40 MG tablet TAKE 1 TABLET EVERY MORNING FOR FLUID AND BLOOD PRESSURE 30 tablet 11   • Insulin Glargine (BASAGLAR KWIKPEN) 100 UNIT/ML injection pen Per SS  3   • ipratropium-albuterol (DUO-NEB) 0.5-2.5 mg/3 ml nebulizer As Needed.     • lithium carbonate 300 MG capsule Take 300 mg by mouth 5 (Five) Times a Day.  2   • losartan (COZAAR) 25 MG tablet Take 1 tablet by mouth Daily. 30 tablet 11   • nitroglycerin (NITROSTAT) 0.4 MG SL tablet Place 0.4 mg under the tongue Every 5 (Five) Minutes As Needed for Chest Pain.  2   • NOVOLOG MIX 70/30 FLEXPEN (70-30) 100 UNIT/ML suspension pen-injector injection Inject 40 Units under the skin into the appropriate area as directed 2 (Two) Times a Day Before Meals.  2   • oxyCODONE-acetaminophen (PERCOCET) 5-325 MG per tablet Take 1 tablet by mouth Every 6 (Six) Hours As Needed.     • sertraline (ZOLOFT) 100 MG tablet Take 200 mg by mouth Daily.  2   • spironolactone (ALDACTONE) 50 MG tablet Take 1 tablet by mouth Daily. 90 tablet 1   • tamsulosin (FLOMAX) 0.4 MG capsule 24 hr capsule Take 1 capsule by mouth Every Night. 30 capsule 11     No current facility-administered medications for this visit.         Allergies:      Allergies   Allergen Reactions   • Beta Adrenergic Blockers Other (See Comments)     profound blood pressure drops.   • Calcium Channel Blockers Other (See Comments)     profound blood pressure drops.   • Imdur [Isosorbide Dinitrate] Other (See Comments)     Headaches  Patient states his blood  pressure drops profoundly?        Past Surgical History:     Past Surgical History:   Procedure Laterality Date   • ANAL FISTULA REPAIR     • CARDIAC CATHETERIZATION     • CARDIAC CATHETERIZATION     • CAROTID STENT     • CHOLECYSTECTOMY     • CORONARY ANGIOPLASTY WITH STENT PLACEMENT Left 2015    Persistent cardiac symptoms with cardiac catheterization, Dr. Moss, with PTCA and stenting of the mid-LAD, 2015.   • GANGLION CYST EXCISION      left knee   • PYLOROMYOTOMY           Social History:     Social History     Socioeconomic History   • Marital status:      Spouse name: Not on file   • Number of children: Not on file   • Years of education: Not on file   • Highest education level: Not on file   Tobacco Use   • Smoking status: Former Smoker     Packs/day: 1.00     Years: 15.00     Pack years: 15.00     Types: Cigarettes     Start date: 1985     Last attempt to quit:      Years since quittin.6   • Smokeless tobacco: Never Used   • Tobacco comment: Quit 12 years ago; smoked 17 years at 1 PPD   Substance and Sexual Activity   • Alcohol use: No     Comment: Quit 12 years ago; drank a fifth a day for 20 years   • Drug use: No   • Sexual activity: Yes     Partners: Female     Birth control/protection: None       Family History:     Family History   Problem Relation Age of Onset   • Heart disease Father         CAD, 25 year old   • Nephrolithiasis Father    • Heart attack Father    • Atrial fibrillation Mother    • Arrhythmia Mother    • No Known Problems Sister        Review of Systems:     Review of Systems   Constitutional: Negative.    HENT: Positive for dental problem.    Eyes: Negative.    Respiratory: Negative.    Cardiovascular: Negative.    Gastrointestinal: Negative.    Endocrine: Negative.    Musculoskeletal: Negative.    Allergic/Immunologic: Negative.    Neurological: Negative.    Hematological: Negative.    Psychiatric/Behavioral: Negative.        Physical Exam:      Physical Exam   Constitutional: He is oriented to person, place, and time. He appears well-developed and well-nourished.   HENT:   Head: Normocephalic and atraumatic.   Eyes: Conjunctivae and EOM are normal. Pupils are equal, round, and reactive to light.   Neck: Normal range of motion.   Cardiovascular: Normal rate, regular rhythm, normal heart sounds and intact distal pulses.   Pulmonary/Chest: Effort normal and breath sounds normal.   Abdominal: Soft. Bowel sounds are normal.   Musculoskeletal: Normal range of motion.   Neurological: He is alert and oriented to person, place, and time. He has normal reflexes.   Skin: Skin is warm and dry.   Psychiatric: He has a normal mood and affect. His behavior is normal. Judgment and thought content normal.   Nursing note and vitals reviewed.      I have reviewed the following portions of the patient's history: allergies, current medications, past family history, past medical history, past social history, past surgical history, problem list and ROS and confirm it's accurate.      Procedure:       Assessment/Plan:   Ureteral calculus-patient has been diagnosed with a ureteral calculus.  We have discussed the various parameters regarding spontaneous passage including the notion that a 2 mm stone has a high likelihood of spontaneous passage versus a larger stone being caught up in the upper areas of the urinary tract.  We also discussed the medical management of stone disease and the use of medical expulsive therapy in the form of Flomax.  This is used in an off label setting.  We discussed the indicators for intervention including  absolute indicators such as sepsis and uncontrollable severe pain as well as  the relative indicators of moderate pain that is well-controlled with various analgesia.  I also talked about nonoperative management including ambulation and increasing fluids and hot tub as being an effective adjuncts in the treatment of a ureteral stone.  A single  small left distal ureteral stone seen on KUB  Narcotic pain medication-patient has significant acute pain that I believe would be an indication for the use of narcotic pain medication.  I discussed the significant risks of pain medication and the fact that this will be a short only option and I will give her no more than a three-day supply of pain medication.  And I will not plan long-term medication and that this will be sent to a pain clinic if at all becomes necessary.  We discussed signing a pain medication agreement and the fact that we're going to run a state CLAY review to be sure the patient is not getting pain medication from elsewhere.  If this is the case we will not give pain medication.  As part of the patient's treatment plan of there being prescribed a controlled substance with potential for abuse.  This patient has been wait aware of the appropriate dose of such medications including, the risk for somnolence, limited ability to drive and/or safety and the significant potential for overdose.  It is been made clear that these medications are for the prescribed patient only without concomitant use of alcohol or other sepsis unless prescribed by the medical provider.  Has completed prescribing agreement detailing the terms of continue prescribing him a controlled substance.  Including monitoring [place] ports, the possibility of urine drug screens and pedal counts.  The patient is aware that we reviewed CLAY reports on a regular basis and scan them into the chart.  History and physical examination exhibited continued safe and appropriate use of controlled substances.  Also discussed the fact that the new [place] legislation allows only a three-day prescription for pain medication.  In this situation he will be referred to a chronic pain clinic.            Patient's Body mass index is 41.09 kg/m². BMI is above normal parameters. Recommendations include: educational material.              This document  has been electronically signed by LUIS ANTONIO DUVAL MD August 26, 2019 2:04 PM

## 2019-08-26 NOTE — PATIENT INSTRUCTIONS
Obesity, Adult  Obesity is the condition of having too much total body fat. Being overweight or obese means that your weight is greater than what is considered healthy for your body size. Obesity is determined by a measurement called BMI. BMI is an estimate of body fat and is calculated from height and weight. For adults, a BMI of 30 or higher is considered obese.  Obesity can eventually lead to other health concerns and major illnesses, including:  · Stroke.  · Coronary artery disease (CAD).  · Type 2 diabetes.  · Some types of cancer, including cancers of the colon, breast, uterus, and gallbladder.  · Osteoarthritis.  · High blood pressure (hypertension).  · High cholesterol.  · Sleep apnea.  · Gallbladder stones.  · Infertility problems.  What are the causes?  The main cause of obesity is taking in (consuming) more calories than your body uses for energy. Other factors that contribute to this condition may include:  · Being born with genes that make you more likely to become obese.  · Having a medical condition that causes obesity. These conditions include:  ? Hypothyroidism.  ? Polycystic ovarian syndrome (PCOS).  ? Binge-eating disorder.  ? Cushing syndrome.  · Taking certain medicines, such as steroids, antidepressants, and seizure medicines.  · Not being physically active (sedentary lifestyle).  · Living where there are limited places to exercise safely or buy healthy foods.  · Not getting enough sleep.  What increases the risk?  The following factors may increase your risk of this condition:  · Having a family history of obesity.  · Being a woman of -American descent.  · Being a man of  descent.  What are the signs or symptoms?  Having excessive body fat is the main symptom of this condition.  How is this diagnosed?  This condition may be diagnosed based on:  · Your symptoms.  · Your medical history.  · A physical exam. Your health care provider may measure:  ? Your BMI. If you are an adult  with a BMI between 25 and less than 30, you are considered overweight. If you are an adult with a BMI of 30 or higher, you are considered obese.  ? The distances around your hips and your waist (circumferences). These may be compared to each other to help diagnose your condition.  ? Your skinfold thickness. Your health care provider may gently pinch a fold of your skin and measure it.  How is this treated?  Treatment for this condition often includes changing your lifestyle. Treatment may include some or all of the following:  · Dietary changes. Work with your health care provider and a dietitian to set a weight-loss goal that is healthy and reasonable for you. Dietary changes may include eating:  ? Smaller portions. A portion size is the amount of a particular food that is healthy for you to eat at one time. This varies from person to person.  ? Low-calorie or low-fat options.  ? More whole grains, fruits, and vegetables.  · Regular physical activity. This may include aerobic activity (cardio) and strength training.  · Medicine to help you lose weight. Your health care provider may prescribe medicine if you are unable to lose 1 pound a week after 6 weeks of eating more healthily and doing more physical activity.  · Surgery. Surgical options may include gastric banding and gastric bypass. Surgery may be done if:  ? Other treatments have not helped to improve your condition.  ? You have a BMI of 40 or higher.  ? You have life-threatening health problems related to obesity.  Follow these instructions at home:    Eating and drinking    · Follow recommendations from your health care provider about what you eat and drink. Your health care provider may advise you to:  ? Limit fast foods, sweets, and processed snack foods.  ? Choose low-fat options, such as low-fat milk instead of whole milk.  ? Eat 5 or more servings of fruits or vegetables every day.  ? Eat at home more often. This gives you more control over what you  eat.  ? Choose healthy foods when you eat out.  ? Learn what a healthy portion size is.  ? Keep low-fat snacks on hand.  ? Avoid sugary drinks, such as soda, fruit juice, iced tea sweetened with sugar, and flavored milk.  ? Eat a healthy breakfast.  · Drink enough water to keep your urine clear or pale yellow.  · Do not go without eating for long periods of time (do not fast) or follow a fad diet. Fasting and fad diets can be unhealthy and even dangerous.  Physical Activity  · Exercise regularly, as told by your health care provider. Ask your health care provider what types of exercise are safe for you and how often you should exercise.  · Warm up and stretch before being active.  · Cool down and stretch after being active.  · Rest between periods of activity.  Lifestyle  · Limit the time that you spend in front of your TV, computer, or video game system.  · Find ways to reward yourself that do not involve food.  · Limit alcohol intake to no more than 1 drink a day for nonpregnant women and 2 drinks a day for men. One drink equals 12 oz of beer, 5 oz of wine, or 1½ oz of hard liquor.  General instructions  · Keep a weight loss journal to keep track of the food you eat and how much you exercise you get.  · Take over-the-counter and prescription medicines only as told by your health care provider.  · Take vitamins and supplements only as told by your health care provider.  · Consider joining a support group. Your health care provider may be able to recommend a support group.  · Keep all follow-up visits as told by your health care provider. This is important.  Contact a health care provider if:  · You are unable to meet your weight loss goal after 6 weeks of dietary and lifestyle changes.  This information is not intended to replace advice given to you by your health care provider. Make sure you discuss any questions you have with your health care provider.  Document Released: 01/25/2006 Document Revised: 05/22/2017  Document Reviewed: 10/05/2016  [employer] Interactive Patient Education © 2019 [employer]  MyPlate from [employer]    MyPlate is an outline of a general healthy diet based on the 2010 Dietary Guidelines for Americans, from the U.S. Department of Agriculture (USDA). It sets guidelines for how much food you should eat from each food group based on your age, sex, and level of physical activity.  What are tips for following MyPlate?  To follow MyPlate recommendations:  · Eat a wide variety of fruits and vegetables, grains, and protein foods.  · Serve smaller portions and eat less food throughout the day.  · Limit portion sizes to avoid overeating.  · Enjoy your food.  · Get at least 150 minutes of exercise every week. This is about 30 minutes each day, 5 or more days per week.  It can be difficult to have every meal look like MyPlate. Think about MyPlate as eating guidelines for an entire day, rather than each individual meal.  Fruits and vegetables  · Make half of your plate fruits and vegetables.  · Eat many different colors of fruits and vegetables each day.  · For a 2,000 calorie daily food plan, eat:  ? 2½ cups of vegetables every day.  ? 2 cups of fruit every day.  · 1 cup is equal to:  ? 1 cup raw or cooked vegetables.  ? 1 cup raw fruit.  ? 1 medium-sized orange, apple, or banana.  ? 1 cup 100% fruit or vegetable juice.  ? 2 cups raw leafy greens, such as lettuce, spinach, or kale.  ? ½ cup dried fruit.  Grains  · One fourth of your plate should be grains.  · Make at least half of the grains you eat each day whole grains.  · For a 2,000 calorie daily food plan, eat 6 oz of grains every day.  · 1 oz is equal to:  ? 1 slice bread.  ? 1 cup cereal.  ? ½ cup cooked rice, cereal, or pasta.  Protein  · One fourth of your plate should be protein.  · Eat a wide variety of protein foods, including meat, poultry, fish, eggs, beans, nuts, and tofu.  · For a 2,000 calorie daily food plan, eat 5½ oz of protein every day.  · 1 oz  is equal to:  ? 1 oz meat, poultry, or fish.  ? ¼ cup cooked beans.  ? 1 egg.  ? ½ oz nuts or seeds.  ? 1 Tbsp peanut butter.  Dairy  · Drink fat-free or low-fat (1%) milk.  · Eat or drink dairy as a side to meals.  · For a 2,000 calorie daily food plan, eat or drink 3 cups of dairy every day.  · 1 cup is equal to:  ? 1 cup milk, yogurt, cottage cheese, or soy milk (soy beverage).  ? 2 oz processed cheese.  ? 1½ oz natural cheese.  Fats, oils, salt, and sugars  · Only small amounts of oils are recommended.  · Avoid foods that are high in calories and low in nutritional value (empty calories), like foods high in fat or added sugars.  · Choose foods that are low in salt (sodium). Choose foods that have less than 140 milligrams (mg) of sodium per serving.  · Drink water instead of sugary drinks. Drink enough water each day to keep your urine pale yellow.  Where to find support  · Work with your health care provider or a nutrition specialist (dietitian) to develop a customized eating plan that is right for you.  · Download an maría (mobile application) to help you track your daily food intake.  Where to find more information  · Go to ChooseMyPlate.gov for more information.  · Learn more and log your daily food intake according to MyPlate using USDA's SuperTracker: www.supertracker.usda.gov  Summary  · MyPlate is a general guideline for healthy eating from the USDA. It is based on the 2010 Dietary Guidelines for Americans.  · In general, fruits and vegetables should take up ½ of your plate, grains should take up ¼ of your plate, and protein should take up ¼ of your plate.  This information is not intended to replace advice given to you by your health care provider. Make sure you discuss any questions you have with your health care provider.  Document Released: 01/06/2009 Document Revised: 03/19/2018 Document Reviewed: 03/19/2018  [employer] Interactive Patient Education © 2019 [employer]

## 2019-08-26 NOTE — PROGRESS NOTES
Subjective   Ricardo Crespo is a 49 y.o. male     Chief Complaint   Patient presents with   • Coronary Artery Disease     Here for 6 mo. f/u   • Hypertension   • Hyperlipidemia   • Atrial Fibrillation   • Congestive Heart Failure   • Sleep Apnea       PROBLEM LIST:     1. Coronary artery disease  1.1. Stenting of the LAD, 11/2015, per Dr. Moss.  1.2. Repeat catheterization 2-3 weeks after stenting as above, indicating patent stent with 40% PDA stenosis. Medical management was recommended.  1.3. Repeat catheterization, 01/2016, indicating nonobstructive disease with patent stent. Medical management was recommended.  1.4.  Stenting of the PDA, 03/2017, in the setting of low-level symptoms and abnormal stress test.  Previous stent to the LAD was patent.  The patient had nonobstructive disease otherwise.  Medical management was recommended.  1.5.  Stenting of the mid RCA and PTCA only of the distal LAD, 09/17.  1.6.  Repeat LHC, 11/17, in the setting of acute chest pain.  The patient had 50% LAD stenosis, patent stents, and non-obstructive CAD otherwise.  1. 7 repeat left heart catheter March 2018 with stenting of the LAD ×2.  Nonobstructive disease otherwise and patent stent with medical management recommended  1.8 left heart catheter 10/16/18-30-40% LAD, 40% apical branch, 10% circumflex left, right coronary artery 10%, EF 60%  1.9 LHC, 2-8-19, EF 55-60%, demonstrated widely patent epicardial coronary arteries with diffuse non-obstructive disease with empiric therapy for ischemia and risk factor manag.  2. Preserved systolic function  3. Hypertension  4. Dyslipidemia  5. Diabetes mellitus  6. Sleep apnea  7. Reported history of DVT, Eliquis   8. Atrial Fibrillation [id number] = 2, on Eliquis                     Specialty Problems        Cardiology Problems    Coronary artery disease involving native coronary artery of native heart without angina pectoris        Essential hypertension        Hyperlipidemia LDL  goal <70        Palpitation        Chronic diastolic congestive heart failure (CMS/HCC)        Paroxysmal atrial fibrillation (CMS/HCC)                HPI:    Mr. Crespo returns for follow-up on coronary artery disease, congestive heart failure related to diastolic dysfunction, paroxysmal atrial fibrillation, and cardiac risk factor modification.    He describes that, for the last 3 months, he has had increasingly frequent episodes of angina which occur at lower levels of physical activity and previously.  He describes walking up stairs or even walking on a level surface at a rapid pace is precipitating angina.  Symptoms usually resolve within several minutes of rest but the patient does take nitroglycerin on a fairly regular basis.  He has had no significant rest pain.    Mr. Crespo also describes more frequent episodes of orthopnea and PND.  Lower extremity edema remains relatively unchanged.    We reviewed the patient's prior catheterization findings, and the fact that he has been intolerant of long-acting nitrates, beta-blockers, and calcium channel blockers.  He did respond favorably to Ranexa but he was asked by his hepatologist to stop that medication because of hepatic dysfunction.    From the standpoint of atrial fibrillation the patient is doing well.  He had mild nosebleeds approximately 1 month ago since resolved.  He has had no significant bleeding otherwise, and denies any symptoms of TIA or stroke.                      CURRENT MEDICATION:    Current Outpatient Medications   Medication Sig Dispense Refill   • albuterol sulfate  (90 Base) MCG/ACT inhaler As Needed.     • aspirin 81 MG tablet Take 1 tablet by mouth Daily. 30 tablet 11   • ELIQUIS 5 MG tablet tablet TAKE 1 TABLET TWO TIMES A DAY AS DIRECTED BY YOUR PRESCRIBER FOR BLOOD THINNER 60 tablet 5   • furosemide (LASIX) 40 MG tablet TAKE 1 TABLET EVERY MORNING FOR FLUID AND BLOOD PRESSURE 30 tablet 11   • Insulin Glargine (BASAGLAR  KWIKPEN) 100 UNIT/ML injection pen Per SS  3   • ipratropium-albuterol (DUO-NEB) 0.5-2.5 mg/3 ml nebulizer As Needed.     • lithium carbonate 300 MG capsule Take 300 mg by mouth 5 (Five) Times a Day.  2   • nitroglycerin (NITROSTAT) 0.4 MG SL tablet Place 0.4 mg under the tongue Every 5 (Five) Minutes As Needed for Chest Pain.  2   • NOVOLOG MIX 70/30 FLEXPEN (70-30) 100 UNIT/ML suspension pen-injector injection Inject 40 Units under the skin into the appropriate area as directed 2 (Two) Times a Day Before Meals.  2   • oxyCODONE-acetaminophen (PERCOCET) 5-325 MG per tablet Take 1 tablet by mouth Every 6 (Six) Hours As Needed.     • sertraline (ZOLOFT) 100 MG tablet Take 200 mg by mouth Daily.  2   • spironolactone (ALDACTONE) 50 MG tablet Take 1 tablet by mouth Daily. 90 tablet 1   • tamsulosin (FLOMAX) 0.4 MG capsule 24 hr capsule Take 1 capsule by mouth Every Night. 30 capsule 11     No current facility-administered medications for this visit.        ALLERGIES:    Beta adrenergic blockers; Calcium channel blockers; and Imdur [isosorbide dinitrate]    PAST MEDICAL HISTORY:    Past Medical History:   Diagnosis Date   • Abnormal ECG    • Alcohol liver damage (CMS/HCC)    • Anxiety    • Arrhythmia    • Atrial fibrillation (CMS/HCC)    • Benign hypertension    • Bipolar depression (CMS/HCC)    • CHF (congestive heart failure) (CMS/HCC)    • Cirrhosis of liver (CMS/HCC)    • Clotting disorder (CMS/HCC)    • Coronary artery disease    • DVT (deep venous thrombosis) (CMS/HCC)     Questionable history of deep venous thrombosis.   • Dyslipidemia    • Hyperlipidemia    • Myocardial infarction (CMS/HCC)     x 2   • Obesity    • JIM on CPAP     Obstructive sleep apnea, compliant with CPAP.    • Type 2 diabetes mellitus (CMS/HCC)    • Valvular disease        SURGICAL HISTORY:    Past Surgical History:   Procedure Laterality Date   • ANAL FISTULA REPAIR     • CARDIAC CATHETERIZATION     • CARDIAC CATHETERIZATION     • CAROTID  STENT     • CHOLECYSTECTOMY     • CORONARY ANGIOPLASTY WITH STENT PLACEMENT Left 2015    Persistent cardiac symptoms with cardiac catheterization, Dr. Moss, with PTCA and stenting of the mid-LAD, 2015.   • GANGLION CYST EXCISION      left knee   • PYLOROMYOTOMY         SOCIAL HISTORY:    Social History     Socioeconomic History   • Marital status:      Spouse name: Not on file   • Number of children: Not on file   • Years of education: Not on file   • Highest education level: Not on file   Tobacco Use   • Smoking status: Former Smoker     Packs/day: 1.00     Years: 15.00     Pack years: 15.00     Types: Cigarettes     Start date: 1985     Last attempt to quit:      Years since quittin.6   • Smokeless tobacco: Never Used   • Tobacco comment: Quit 12 years ago; smoked 17 years at 1 PPD   Substance and Sexual Activity   • Alcohol use: No     Comment: Quit 12 years ago; drank a fifth a day for 20 years   • Drug use: No   • Sexual activity: Yes     Partners: Female     Birth control/protection: None       FAMILY HISTORY:    Family History   Problem Relation Age of Onset   • Heart disease Father         CAD, 25 year old   • Nephrolithiasis Father    • Heart attack Father    • Atrial fibrillation Mother    • Arrhythmia Mother    • No Known Problems Sister        Review of Systems   Constitutional: Positive for fatigue.   HENT: Negative.    Eyes: Positive for visual disturbance (glasses prn).   Respiratory: Positive for shortness of breath.    Cardiovascular: Positive for chest pain, palpitations (HX parox. a-fib) and leg swelling (rt.).   Gastrointestinal: Negative.  Negative for blood in stool (no melena,hemoptysis,hematochezia).   Endocrine: Negative.    Genitourinary: Positive for hematuria (r/t current kidney stones).   Musculoskeletal: Negative.    Skin: Negative.    Allergic/Immunologic: Negative.    Neurological: Positive for light-headedness.   Hematological: Negative.   "  Psychiatric/Behavioral: Negative.        VISIT VITALS:  Vitals:    08/26/19 0903 08/26/19 0912   BP: (!) 82/56 120/78   BP Location: Left arm Left arm   Patient Position: Sitting Sitting   Pulse: 70    SpO2: 99%    Weight: (!) 137 kg (303 lb)    Height: 182.9 cm (72.01\")       /78 (BP Location: Left arm, Patient Position: Sitting)   Pulse 70   Ht 182.9 cm (72.01\")   Wt (!) 137 kg (303 lb)   SpO2 99%   BMI 41.09 kg/m²     RECENT LABS:    Objective       Physical Exam   Constitutional: He is oriented to person, place, and time. He appears well-developed and well-nourished. No distress.   HENT:   Head: Normocephalic and atraumatic.   Eyes: Conjunctivae and EOM are normal. Pupils are equal, round, and reactive to light.   Neck: Normal range of motion. Neck supple. No hepatojugular reflux and no JVD present. Carotid bruit is not present. No tracheal deviation present.   Nl. Carotid upstrokes     Cardiovascular: Normal rate, regular rhythm and intact distal pulses. Exam reveals gallop and S4. Exam reveals no S3 and no friction rub.   No murmur heard.  Pulses:       Radial pulses are 2+ on the right side, and 2+ on the left side.   Distant S1 and S2   Pulmonary/Chest: Effort normal and breath sounds normal. He has no wheezes. He has no rhonchi. He has no rales.   Nl. Expir. Phase  Nl. Breath sound intensity   Abdominal: Soft. Bowel sounds are normal. He exhibits no distension, no abdominal bruit and no mass. There is no tenderness. There is no rebound and no guarding.   No organomegaly   Musculoskeletal: Normal range of motion. He exhibits edema. He exhibits no tenderness or deformity.   LLE, trace edema, no venous stasis changes  RLE, 1+ edema, marked venous stasis changes   Neurological: He is alert and oriented to person, place, and time.   Skin: Skin is warm and dry. No rash noted. No erythema. No pallor.   Psychiatric: He has a normal mood and affect. His behavior is normal. Judgment and thought content " normal.   Nursing note and vitals reviewed.        ECG 12 Lead  Date/Time: 8/26/2019 10:00 AM  Performed by: Leo Ramirez MD  Authorized by: Leo Ramirez MD   Comparison: not compared with previous ECG                 Assessment/Plan   #1.  Coronary artery disease.  Anatomy by recent cardiac catheterization is as described above.  We have been reluctant to pursue mechanical intervention in the LAD because of the location of the single significant stenosis very distally in that vessel.,  Clearly, the patient is having worsened symptoms of ischemia.  We are handcuffed dermatologically because of the patient's multiple drug intolerance.  Therefore, we may need to consider repeat catheterization with the intent of revascularization of the LAD.    2.  Mr. Crespo is poorly controlled diabetes and is not on ACE inhibitor nor angiotensin receptor blocker.  I would like to trial losartan 25 mg daily.  The patient will follow symptoms and blood pressures closely.  If able, I would then like to add very low-dose beta-blocker or calcium channel blocker.    3.  Systemic hypertension.  Blood pressures are well controlled.    4.  Dyslipidemia.  Mr. Crespo was intolerant of statin despite concomitant use of coenzyme Q 10 and tonic water.  We will therefore attempt to qualify him for PCSK9 inhibitor therapy.    5.  The patient will call us in 2 weeks with blood pressures and symptoms, he will follow with Dr. Bush as instructed.   Diagnosis Plan   1. Chronic diastolic congestive heart failure (CMS/HCC)     2. Coronary artery disease involving native coronary artery of native heart without angina pectoris     3. Essential hypertension     4. Hyperlipidemia LDL goal <70     5. Palpitation     6. Paroxysmal atrial fibrillation (CMS/HCC)     7. JIM on CPAP     8. Shortness of breath     9. Precordial pain     10. Dizziness     11. Lightheadedness         No Follow-up on file.                  Patient's Body mass index is  41.09 kg/m². BMI is above normal parameters. Recommendations include: educational material and referral to primary care.       Taya Gonzalez LPN    Scribed for Dr. Leo Ramirez by Taya Gonzalez LPN August 26, 2019 9:31 AM         Electronically signed by:            This note is dictated utilizing voice recognition software.  Although this record has been proof read, transcriptional errors may still be present. If questions occur regarding the content of this record please do not hesitate to call our office.

## 2019-08-26 NOTE — TELEPHONE ENCOUNTER
Patient was seen in [facility] on Tuesday, was told if he needed pain medication before his next appointment to call the office.

## 2019-08-27 PROBLEM — N20.1 LEFT URETERAL CALCULUS: Status: ACTIVE | Noted: 2019-08-27

## 2019-09-10 ENCOUNTER — OFFICE VISIT (OUTPATIENT)
Dept: UROLOGY | Facility: CLINIC | Age: 49
End: 2019-09-10

## 2019-09-10 ENCOUNTER — HOSPITAL ENCOUNTER (OUTPATIENT)
Dept: GENERAL RADIOLOGY | Facility: HOSPITAL | Age: 49
Discharge: HOME | End: 2019-09-10
Admitting: UROLOGY

## 2019-09-10 ENCOUNTER — TELEPHONE (OUTPATIENT)
Dept: CARDIOLOGY | Facility: CLINIC | Age: 49
End: 2019-09-10

## 2019-09-10 VITALS
BODY MASS INDEX: 40.09 KG/M2 | SYSTOLIC BLOOD PRESSURE: 118 MMHG | WEIGHT: 296 LBS | HEIGHT: 72 IN | DIASTOLIC BLOOD PRESSURE: 72 MMHG

## 2019-09-10 DIAGNOSIS — N20.0 KIDNEY STONE: Primary | ICD-10-CM

## 2019-09-10 PROCEDURE — 74018 RADEX ABDOMEN 1 VIEW: CPT

## 2019-09-10 PROCEDURE — 74018 RADEX ABDOMEN 1 VIEW: CPT | Performed by: RADIOLOGY

## 2019-09-10 PROCEDURE — 82360 CALCULUS ASSAY QUANT: CPT | Performed by: UROLOGY

## 2019-09-10 PROCEDURE — 99213 OFFICE O/P EST LOW 20 MIN: CPT | Performed by: UROLOGY

## 2019-09-10 RX ORDER — OXYCODONE HYDROCHLORIDE AND ACETAMINOPHEN 5; 325 MG/1; MG/1
TABLET ORAL
Qty: 28 TABLET | Refills: 0 | Status: SHIPPED | OUTPATIENT
Start: 2019-09-10 | End: 2020-09-30

## 2019-09-10 NOTE — PROGRESS NOTES
Chief Complaint:          Ureteral stones    HPI:   49 y.o. male.  Pt has passed 2 stones in the past 3 weeks.  HPI  His kub is negative today.  Pt has a 5 to 7 pain on scale of 10.    Past Medical History:        Past Medical History:   Diagnosis Date   • Abnormal ECG    • Alcohol liver damage (CMS/HCC)    • Anxiety    • Arrhythmia    • Atrial fibrillation (CMS/HCC)    • Benign hypertension    • Bipolar depression (CMS/HCC)    • CHF (congestive heart failure) (CMS/HCC)    • Cirrhosis of liver (CMS/HCC)    • Clotting disorder (CMS/HCC)    • Coronary artery disease    • DVT (deep venous thrombosis) (CMS/HCC)     Questionable history of deep venous thrombosis.   • Dyslipidemia    • Hyperlipidemia    • Myocardial infarction (CMS/HCC)     x 2   • Obesity    • JIM on CPAP     Obstructive sleep apnea, compliant with CPAP.    • Type 2 diabetes mellitus (CMS/HCC)    • Valvular disease          Current Meds:     Current Outpatient Medications   Medication Sig Dispense Refill   • albuterol sulfate  (90 Base) MCG/ACT inhaler As Needed.     • aspirin 81 MG tablet Take 1 tablet by mouth Daily. 30 tablet 11   • ELIQUIS 5 MG tablet tablet TAKE 1 TABLET TWO TIMES A DAY AS DIRECTED BY YOUR PRESCRIBER FOR BLOOD THINNER 60 tablet 5   • furosemide (LASIX) 40 MG tablet TAKE 1 TABLET EVERY MORNING FOR FLUID AND BLOOD PRESSURE 30 tablet 11   • Insulin Glargine (BASAGLAR KWIKPEN) 100 UNIT/ML injection pen Per SS  3   • lithium carbonate 300 MG capsule Take 300 mg by mouth 5 (Five) Times a Day.  2   • losartan (COZAAR) 25 MG tablet Take 1 tablet by mouth Daily. 30 tablet 11   • nitroglycerin (NITROSTAT) 0.4 MG SL tablet Place 0.4 mg under the tongue Every 5 (Five) Minutes As Needed for Chest Pain.  2   • NOVOLOG MIX 70/30 FLEXPEN (70-30) 100 UNIT/ML suspension pen-injector injection Inject 40 Units under the skin into the appropriate area as directed 2 (Two) Times a Day Before Meals.  2   • sertraline (ZOLOFT) 100 MG tablet Take 200  mg by mouth Daily.  2   • spironolactone (ALDACTONE) 50 MG tablet Take 1 tablet by mouth Daily. 90 tablet 1   • tamsulosin (FLOMAX) 0.4 MG capsule 24 hr capsule Take 1 capsule by mouth Every Night. 30 capsule 11     No current facility-administered medications for this visit.         Allergies:      Allergies   Allergen Reactions   • Beta Adrenergic Blockers Other (See Comments)     profound blood pressure drops.   • Calcium Channel Blockers Other (See Comments)     profound blood pressure drops.   • Imdur [Isosorbide Dinitrate] Other (See Comments)     Headaches  Patient states his blood pressure drops profoundly?        Past Surgical History:     Past Surgical History:   Procedure Laterality Date   • ANAL FISTULA REPAIR     • CARDIAC CATHETERIZATION     • CARDIAC CATHETERIZATION     • CAROTID STENT     • CHOLECYSTECTOMY     • CORONARY ANGIOPLASTY WITH STENT PLACEMENT Left 2015    Persistent cardiac symptoms with cardiac catheterization, Dr. Moss, with PTCA and stenting of the mid-LAD, 2015.   • GANGLION CYST EXCISION      left knee   • PYLOROMYOTOMY           Social History:     Social History     Socioeconomic History   • Marital status:      Spouse name: Not on file   • Number of children: Not on file   • Years of education: Not on file   • Highest education level: Not on file   Tobacco Use   • Smoking status: Former Smoker     Packs/day: 1.00     Years: 15.00     Pack years: 15.00     Types: Cigarettes     Start date: 1985     Last attempt to quit: 2008     Years since quittin.6   • Smokeless tobacco: Never Used   • Tobacco comment: Quit 12 years ago; smoked 17 years at 1 PPD   Substance and Sexual Activity   • Alcohol use: No     Comment: Quit 12 years ago; drank a fifth a day for 20 years   • Drug use: No   • Sexual activity: Yes     Partners: Female     Birth control/protection: None       Family History:     Family History   Problem Relation Age of Onset   • Heart disease Father          CAD, 25 year old   • Nephrolithiasis Father    • Heart attack Father    • Atrial fibrillation Mother    • Arrhythmia Mother    • No Known Problems Sister        Review of Systems:     Review of Systems   Constitutional: Negative for chills and fever.   HENT: Negative for sinus pressure and sinus pain.    Respiratory: Negative for cough.    Cardiovascular: Negative for leg swelling.   Gastrointestinal: Positive for abdominal pain, nausea and vomiting.   Genitourinary: Positive for decreased urine volume, dysuria, frequency and hematuria. Negative for difficulty urinating and urgency.   Musculoskeletal: Positive for back pain.   Neurological: Negative for headaches.   Psychiatric/Behavioral: The patient is not nervous/anxious.          Physical Exam:     Physical Exam   Constitutional: He is oriented to person, place, and time.   HENT:   Head: Normocephalic and atraumatic.   Right Ear: External ear normal.   Left Ear: External ear normal.   Nose: Nose normal.   Mouth/Throat: Oropharynx is clear and moist.   Eyes: Conjunctivae and EOM are normal. Pupils are equal, round, and reactive to light.   Neck: Normal range of motion. Neck supple. No thyromegaly present.   Cardiovascular: Normal rate, regular rhythm, normal heart sounds and intact distal pulses.   No murmur heard.  Pulmonary/Chest: Effort normal and breath sounds normal. No respiratory distress. He has no wheezes. He has no rales. He exhibits no tenderness.   Abdominal: Soft. Bowel sounds are normal. He exhibits no distension and no mass. There is no tenderness. No hernia.   Genitourinary: Rectum normal, prostate normal and penis normal.   Musculoskeletal: Normal range of motion. He exhibits no edema or tenderness.   Lymphadenopathy:     He has no cervical adenopathy.   Neurological: He is alert and oriented to person, place, and time. No cranial nerve deficit. He exhibits normal muscle tone. Coordination normal.   Skin: Skin is warm. No rash noted.  "  Psychiatric: He has a normal mood and affect. His behavior is normal. Judgment and thought content normal.   Nursing note and vitals reviewed.      /72 (BP Location: Left arm, Patient Position: Sitting, Cuff Size: Adult)   Ht 182.9 cm (72\")   Wt 134 kg (296 lb)   BMI 40.14 kg/m²    Procedure:         Assessment:     Encounter Diagnosis   Name Primary?   • Kidney stone Yes       Orders Placed This Encounter   Procedures   • Stone Analysis - Calculus, Voided   • XR Abdomen KUB     Order Specific Question:   Reason for Exam:     Answer:   kidney stone       Patient reports that he is not currently experiencing any symptoms of urinary incontinence.      Plan:   Will send his stone for analysis and since pt is still having pain 3 weeks out I would recommend a stone study ct scan and return 2 weeks.  Will  Refill meds.    Patient's Body mass index is 40.14 kg/m². BMI is above normal parameters. Recommendations include: educational material.      Smoking Cessation Counseling:  Former smoker.  Patient does not currently use any tobacco products.   Quit 15 years ago    Counseling was given to patient for the following topics instructions for management as follows: stones . The interim medical history and current results were reviewed.  A treatment plan with follow-up was made for Kidney stone [N20.0].   This document has been electronically signed by Brady Beal MD September 10, 2019 10:54 AM      "

## 2019-09-10 NOTE — TELEPHONE ENCOUNTER
Called patient regarding lipid panel needed for Repatha PA. He has not had labs drawn yet. He will have them done as soon as he gets time. Lynn Rangel MA

## 2019-09-12 ENCOUNTER — HOSPITAL ENCOUNTER (OUTPATIENT)
Dept: CT IMAGING | Facility: HOSPITAL | Age: 49
Discharge: HOME | End: 2019-09-12
Admitting: UROLOGY

## 2019-09-12 DIAGNOSIS — N20.0 KIDNEY STONE: ICD-10-CM

## 2019-09-12 PROCEDURE — 74176 CT ABD & PELVIS W/O CONTRAST: CPT | Performed by: RADIOLOGY

## 2019-09-12 PROCEDURE — 74176 CT ABD & PELVIS W/O CONTRAST: CPT

## 2019-09-18 LAB
CA PHOS CRY STONE QL IR: 10 %
COD CRY STONE QL IR: 3 %
COLOR STONE: NORMAL
COM CRY STONE QL IR: 87 %
COMPN STONE: NORMAL
Lab: NORMAL
Lab: NORMAL
NIDUS STONE QL: NORMAL
PATH REPORT.COMMENTS IMP SPEC: NORMAL
SIZE STONE: NORMAL MM
WT STONE: 10.2 MG

## 2019-10-28 RX ORDER — NITROGLYCERIN 0.4 MG/1
TABLET SUBLINGUAL
Qty: 25 TABLET | Refills: 3 | Status: SHIPPED | OUTPATIENT
Start: 2019-10-28 | End: 2020-10-19

## 2019-11-07 ENCOUNTER — APPOINTMENT (OUTPATIENT)
Dept: MAMMOGRAPHY | Facility: HOSPITAL | Age: 49
End: 2019-11-07

## 2020-03-02 ENCOUNTER — LAB (OUTPATIENT)
Dept: LAB | Facility: HOSPITAL | Age: 50
End: 2020-03-02

## 2020-03-02 ENCOUNTER — OFFICE VISIT (OUTPATIENT)
Dept: CARDIOLOGY | Facility: CLINIC | Age: 50
End: 2020-03-02

## 2020-03-02 VITALS
BODY MASS INDEX: 39.28 KG/M2 | WEIGHT: 290 LBS | SYSTOLIC BLOOD PRESSURE: 114 MMHG | DIASTOLIC BLOOD PRESSURE: 75 MMHG | OXYGEN SATURATION: 98 % | HEIGHT: 72 IN | HEART RATE: 79 BPM

## 2020-03-02 DIAGNOSIS — R06.02 SHORTNESS OF BREATH: ICD-10-CM

## 2020-03-02 DIAGNOSIS — I50.32 CHRONIC DIASTOLIC CONGESTIVE HEART FAILURE (HCC): ICD-10-CM

## 2020-03-02 DIAGNOSIS — R55 SYNCOPE AND COLLAPSE: ICD-10-CM

## 2020-03-02 DIAGNOSIS — I48.0 PAROXYSMAL ATRIAL FIBRILLATION (HCC): ICD-10-CM

## 2020-03-02 DIAGNOSIS — R07.9 CHEST PAIN, UNSPECIFIED TYPE: Primary | ICD-10-CM

## 2020-03-02 DIAGNOSIS — R00.2 PALPITATIONS: ICD-10-CM

## 2020-03-02 DIAGNOSIS — I25.10 CORONARY ARTERY DISEASE INVOLVING NATIVE CORONARY ARTERY OF NATIVE HEART WITHOUT ANGINA PECTORIS: ICD-10-CM

## 2020-03-02 DIAGNOSIS — R07.9 CHEST PAIN, UNSPECIFIED TYPE: ICD-10-CM

## 2020-03-02 DIAGNOSIS — I10 ESSENTIAL HYPERTENSION: ICD-10-CM

## 2020-03-02 DIAGNOSIS — R00.2 PALPITATION: ICD-10-CM

## 2020-03-02 DIAGNOSIS — E78.5 HYPERLIPIDEMIA LDL GOAL <70: ICD-10-CM

## 2020-03-02 LAB
ANION GAP SERPL CALCULATED.3IONS-SCNC: 16.7 MMOL/L (ref 5–15)
BUN BLD-MCNC: 13 MG/DL (ref 6–20)
BUN/CREAT SERPL: 14.4 (ref 7–25)
CALCIUM SPEC-SCNC: 9 MG/DL (ref 8.6–10.5)
CHLORIDE SERPL-SCNC: 96 MMOL/L (ref 98–107)
CO2 SERPL-SCNC: 24.3 MMOL/L (ref 22–29)
CREAT BLD-MCNC: 0.9 MG/DL (ref 0.76–1.27)
DEPRECATED RDW RBC AUTO: 40.2 FL (ref 37–54)
ERYTHROCYTE [DISTWIDTH] IN BLOOD BY AUTOMATED COUNT: 13.8 % (ref 12.3–15.4)
GFR SERPL CREATININE-BSD FRML MDRD: 89 ML/MIN/1.73
GLUCOSE BLD-MCNC: 452 MG/DL (ref 65–99)
HCT VFR BLD AUTO: 43.6 % (ref 37.5–51)
HGB BLD-MCNC: 14.2 G/DL (ref 13–17.7)
MCH RBC QN AUTO: 26.6 PG (ref 26.6–33)
MCHC RBC AUTO-ENTMCNC: 32.6 G/DL (ref 31.5–35.7)
MCV RBC AUTO: 81.8 FL (ref 79–97)
PLATELET # BLD AUTO: 115 10*3/MM3 (ref 140–450)
PMV BLD AUTO: 12.4 FL (ref 6–12)
POTASSIUM BLD-SCNC: 3.8 MMOL/L (ref 3.5–5.2)
RBC # BLD AUTO: 5.33 10*6/MM3 (ref 4.14–5.8)
SODIUM BLD-SCNC: 137 MMOL/L (ref 136–145)
WBC NRBC COR # BLD: 8.21 10*3/MM3 (ref 3.4–10.8)

## 2020-03-02 PROCEDURE — 36415 COLL VENOUS BLD VENIPUNCTURE: CPT

## 2020-03-02 PROCEDURE — 93000 ELECTROCARDIOGRAM COMPLETE: CPT | Performed by: INTERNAL MEDICINE

## 2020-03-02 PROCEDURE — 85027 COMPLETE CBC AUTOMATED: CPT | Performed by: INTERNAL MEDICINE

## 2020-03-02 PROCEDURE — 99214 OFFICE O/P EST MOD 30 MIN: CPT | Performed by: INTERNAL MEDICINE

## 2020-03-02 PROCEDURE — 80048 BASIC METABOLIC PNL TOTAL CA: CPT | Performed by: INTERNAL MEDICINE

## 2020-03-02 RX ORDER — CLOPIDOGREL BISULFATE 75 MG/1
75 TABLET ORAL DAILY
Qty: 30 TABLET | Refills: 0 | Status: SHIPPED | OUTPATIENT
Start: 2020-03-02 | End: 2020-09-30

## 2020-03-02 NOTE — PROGRESS NOTES
Subjective   Ricardo Crespo is a 49 y.o. male     Chief Complaint   Patient presents with   • Atrial Fibrillation     Here for a 6 month follow up    • Congestive Heart Failure   • Hypertension   • Coronary Artery Disease       PROBLEM LIST:     1. Coronary artery disease  1.1. Stenting of the LAD, 11/2015, per Dr. Moss.  1.2. Repeat catheterization 2-3 weeks after stenting as above, indicating patent stent with 40% PDA stenosis. Medical management was recommended.  1.3. Repeat catheterization, 01/2016, indicating nonobstructive disease with patent stent. Medical management was recommended.  1.4.  Stenting of the PDA, 03/2017, in the setting of low-level symptoms and abnormal stress test.  Previous stent to the LAD was patent.  The patient had nonobstructive disease otherwise.  Medical management was recommended.  1.5.  Stenting of the mid RCA and PTCA only of the distal LAD, 09/17.  1.6.  Repeat LHC, 11/17, in the setting of acute chest pain.  The patient had 50% LAD stenosis, patent stents, and non-obstructive CAD otherwise.  1. 7 repeat left heart catheter March 2018 with stenting of the LAD ×2.  Nonobstructive disease otherwise and patent stent with medical management recommended  1.8 left heart catheter 10/16/18-30-40% LAD, 40% apical branch, 10% circumflex left, right coronary artery 10%, EF 60%  1.9 LHC, 2-8-19, EF 55-60%, demonstrated widely patent epicardial coronary arteries with diffuse non-obstructive disease with empiric therapy for ischemia and risk factor manag.  1.10 Stress test 2/28/20, normal EF  2. Preserved systolic function  3. Hypertension  4. Dyslipidemia  5. Diabetes mellitus  6. Sleep apnea  7. Reported history of DVT, Eliquis   8. Atrial Fibrillation [id number] = 2, on Eliquis         Specialty Problems        Cardiology Problems    Coronary artery disease involving native coronary artery of native heart without angina pectoris        Essential hypertension        Hyperlipidemia LDL  "goal <70        Palpitation        Chronic diastolic congestive heart failure (CMS/HCC)        Paroxysmal atrial fibrillation (CMS/HCC)                HPI:  Mr. Crespo returns for follow-up of the above.    He has had increasing exertional angina since the time of his last visit.  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  He is taking up to 4-5 nitroglycerin a day on most days.  He denies rest chest pain.  He denies heart failure, palpitations, presyncope or syncope.  He does have mild orthostatic lightheadedness.      Additionally, Mr. Crespo describes an episode several months ago when he began to develop back discomfort after which he lost consciousness.  He cannot distinctly feel palpitations.  He awoke without postictal state or Misael's paralysis.  He has had no symptoms of TIA or stroke but describes his wife told him that 1 of his eyes occasionally \"groups\".  He does not claudicate or have other symptoms of peripheral arterial disease.    In reviewing data the patient has known residual disease particularly in the distal LAD.  So, Mr. Crespo has absolute intolerance to calcium channel blockers and beta-blockers because of profound hypotension and bradycardia.  He also cannot tolerate long-acting nitrates.  He denies any bleeding.                                PRIOR MEDICATIONS    Current Outpatient Medications on File Prior to Visit   Medication Sig Dispense Refill   • aspirin 81 MG tablet Take 1 tablet by mouth Daily. 30 tablet 11   • ELIQUIS 5 MG tablet tablet TAKE 1 TABLET TWO TIMES A DAY AS DIRECTED BY YOUR PRESCRIBER FOR BLOOD THINNER 60 tablet 5   • furosemide (LASIX) 40 MG tablet TAKE 1 TABLET EVERY MORNING FOR FLUID AND BLOOD PRESSURE 30 tablet 11   • Insulin Glargine (BASAGLAR KWIKPEN) 100 UNIT/ML injection pen Per SS  3   • lithium carbonate 300 MG capsule Take 300 mg by mouth 5 (Five) Times a " Day.  2   • nitroglycerin (NITROSTAT) 0.4 MG SL tablet PLACE 1 TABLET UNDER THE TONGUE EVERY 5 MINUTES UP TO 3 TABLETS IN 15 MINUTES FOR CHEST PAIN. IF NO RELIEF GO TO THE EMERGENCY ROOM OR CALL 25 tablet 3   • NOVOLOG MIX 70/30 FLEXPEN (70-30) 100 UNIT/ML suspension pen-injector injection Inject 40 Units under the skin into the appropriate area as directed 2 (Two) Times a Day Before Meals.  2   • sertraline (ZOLOFT) 100 MG tablet Take 200 mg by mouth Daily.  2   • spironolactone (ALDACTONE) 50 MG tablet Take 1 tablet by mouth Daily. 90 tablet 1   • albuterol sulfate  (90 Base) MCG/ACT inhaler As Needed.     • losartan (COZAAR) 25 MG tablet Take 1 tablet by mouth Daily. 30 tablet 11   • oxyCODONE-acetaminophen (PERCOCET) 5-325 MG per tablet 1 to 2 Tablets Every 6 Hours as needed for PAIN 28 tablet 0   • tamsulosin (FLOMAX) 0.4 MG capsule 24 hr capsule Take 1 capsule by mouth Every Night. 30 capsule 11     No current facility-administered medications on file prior to visit.        ALLERGIES:    Beta adrenergic blockers; Calcium channel blockers; and Imdur [isosorbide dinitrate]    PAST MEDICAL HISTORY:    Past Medical History:   Diagnosis Date   • Abnormal ECG    • Alcohol liver damage (CMS/HCC)    • Anxiety    • Arrhythmia    • Atrial fibrillation (CMS/HCC)    • Benign hypertension    • Bipolar depression (CMS/HCC)    • CHF (congestive heart failure) (CMS/HCC)    • Cirrhosis of liver (CMS/HCC)    • Clotting disorder (CMS/HCC)    • Coronary artery disease    • DVT (deep venous thrombosis) (CMS/HCC)     Questionable history of deep venous thrombosis.   • Dyslipidemia    • Hyperlipidemia    • Myocardial infarction (CMS/HCC)     x 2   • Obesity    • JIM on CPAP     Obstructive sleep apnea, compliant with CPAP.    • Type 2 diabetes mellitus (CMS/HCC)    • Valvular disease        SURGICAL HISTORY:    Past Surgical History:   Procedure Laterality Date   • ANAL FISTULA REPAIR     • CARDIAC CATHETERIZATION     •  CARDIAC CATHETERIZATION     • CAROTID STENT     • CHOLECYSTECTOMY     • CORONARY ANGIOPLASTY WITH STENT PLACEMENT Left 2015    Persistent cardiac symptoms with cardiac catheterization, Dr. Moss, with PTCA and stenting of the mid-LAD, 2015.   • GANGLION CYST EXCISION      left knee   • PYLOROMYOTOMY         SOCIAL HISTORY:    Social History     Socioeconomic History   • Marital status:      Spouse name: Not on file   • Number of children: Not on file   • Years of education: Not on file   • Highest education level: Not on file   Tobacco Use   • Smoking status: Former Smoker     Packs/day: 1.00     Years: 15.00     Pack years: 15.00     Types: Cigarettes     Start date: 1985     Last attempt to quit: 2008     Years since quittin.1   • Smokeless tobacco: Never Used   • Tobacco comment: Quit 12 years ago; smoked 17 years at 1 PPD   Substance and Sexual Activity   • Alcohol use: No     Comment: Quit 12 years ago; drank a fifth a day for 20 years   • Drug use: No   • Sexual activity: Yes     Partners: Female     Birth control/protection: None       FAMILY HISTORY:    Family History   Problem Relation Age of Onset   • Heart disease Father         CAD, 25 year old   • Nephrolithiasis Father    • Heart attack Father    • Atrial fibrillation Mother    • Arrhythmia Mother    • No Known Problems Sister        Review of Systems   Constitutional: Positive for fatigue.   HENT: Negative for congestion, rhinorrhea and sore throat.    Eyes: Positive for visual disturbance (glasses daily ).   Respiratory: Positive for chest tightness and shortness of breath (w/ any exertion ).    Cardiovascular: Positive for chest pain (Occasional dull-sharp mleft sided CP: radiates to jaw on left side, increased SoA and gets sweaty w/ CP), palpitations and leg swelling (RLE, goes down some overnight ).   Gastrointestinal: Negative for abdominal pain, blood in stool, constipation, diarrhea, nausea and vomiting.   Endocrine:  "Negative for cold intolerance and heat intolerance.   Genitourinary: Negative for difficulty urinating, dysuria, frequency, hematuria and urgency.   Musculoskeletal: Positive for arthralgias and back pain (under left shoulder blade). Negative for neck pain.   Skin: Negative for rash and wound.   Allergic/Immunologic: Negative for environmental allergies and food allergies.   Neurological: Positive for dizziness (w/ CP). Negative for syncope, weakness, light-headedness, numbness and headaches.   Hematological: Bruises/bleeds easily (bleeds easily ).   Psychiatric/Behavioral: Negative for sleep disturbance (Denies SoA at HS).       VISIT VITALS:  Vitals:    03/02/20 0940   BP: 114/75   BP Location: Left arm   Patient Position: Sitting   Pulse: 79   SpO2: 98%   Weight: 132 kg (290 lb)   Height: 182.9 cm (72\")      /75 (BP Location: Left arm, Patient Position: Sitting)   Pulse 79   Ht 182.9 cm (72\")   Wt 132 kg (290 lb)   SpO2 98%   BMI 39.33 kg/m²     RECENT LABS:    Objective       Physical Exam   Neck: Carotid bruit is not present.   Nl Carotid upstrokes   Cardiovascular: S1 normal and S2 normal. Exam reveals gallop and S4. Exam reveals no S3 and no friction rub.   No murmur heard.  Heart sounds moderately distant    Pulmonary/Chest: He has no wheezes. He has no rhonchi. He has no rales.   Nl Expir phase  Nl Breath sound intensity    Abdominal: Bowel sounds are normal.   No organomegaly    Musculoskeletal:   LLE  Trace edema, palpable pedal pulses  RLE  1+ edema, moderate venous stasis, palpable pedal pulses         ECG 12 Lead  Date/Time: 3/2/2020 9:53 AM  Performed by: Leo Ramirez MD  Authorized by: Leo Ramirez MD   Comparison: compared with previous ECG from 8/26/2019  Comments: Normal sinus rhythm.  Right bundle branch block, left anterior fascicular block, unchanged from prior.              Assessment/Plan   #1.  Coronary artery disease.  The patient has progressive exertional angina on " maximally tolerated guideline directed therapy.  A stress test from [place] demonstrated infarct but no ischemia.  Regardless, the patient has known residual disease and I feel that cardiac catheterization is indicated in a potentially in stable clinical setting.  Therefore, we have the patient stop aspirin, Plavix, and continue Eliquis until 3 days prior to catheterization.  We will schedule catheterization in the near future.    2.  The patient was given precautions to activate emergency medical services for any chest discomfort not immediately relieved by nitroglycerin.    3.  Mr. Crespo will follow with Dr. Bush as instructed, with further recommendations from our office based on findings of cardiac catheterization.   Diagnosis Plan   1. Chest pain, unspecified type  ECG 12 Lead   2. Palpitations  ECG 12 Lead   3. Coronary artery disease involving native coronary artery of native heart without angina pectoris     4. Essential hypertension     5. Hyperlipidemia LDL goal <70     6. Palpitation     7. Paroxysmal atrial fibrillation (CMS/HCC)     8. Chronic diastolic congestive heart failure (CMS/HCC)     9. Shortness of breath         No follow-ups on file.         Ricardo Crespo  reports that he quit smoking about 12 years ago. His smoking use included cigarettes. He started smoking about 34 years ago. He has a 15.00 pack-year smoking history. He has never used smokeless tobacco.        Patient's Body mass index is 39.33 kg/m². BMI is above normal parameters. Recommendations include: educational material.       Maris De Luna MA    Scribed for Dr. Leo Ramirez by Maris De Luna MA March 2, 2020 10:01 AM     Electronically signed by:            This note is dictated utilizing voice recognition software.  Although this record has been proof read, transcriptional errors may still be present. If questions occur regarding the content of this record please do not hesitate to call our office.

## 2020-03-02 NOTE — PATIENT INSTRUCTIONS
"Fat and Cholesterol Restricted Eating Plan  Getting too much fat and cholesterol in your diet may cause health problems. Choosing the right foods helps keep your fat and cholesterol at normal levels. This can keep you from getting certain diseases.  Your doctor may recommend an eating plan that includes:  · Total fat: ______% or less of total calories a day.  · Saturated fat: ______% or less of total calories a day.  · Cholesterol: less than _________mg a day.  · Fiber: ______g a day.  What are tips for following this plan?  Meal planning  · At meals, divide your plate into four equal parts:  ? Fill one-half of your plate with vegetables and green salads.  ? Fill one-fourth of your plate with whole grains.  ? Fill one-fourth of your plate with low-fat (lean) protein foods.  · Eat fish that is high in omega-3 fats at least two times a week. This includes mackerel, tuna, sardines, and salmon.  · Eat foods that are high in fiber, such as whole grains, beans, apples, broccoli, carrots, peas, and barley.  General tips    · Work with your doctor to lose weight if you need to.  · Avoid:  ? Foods with added sugar.  ? Fried foods.  ? Foods with partially hydrogenated oils.  · Limit alcohol intake to no more than 1 drink a day for nonpregnant women and 2 drinks a day for men. One drink equals 12 oz of beer, 5 oz of wine, or 1½ oz of hard liquor.  Reading food labels  · Check food labels for:  ? Trans fats.  ? Partially hydrogenated oils.  ? Saturated fat (g) in each serving.  ? Cholesterol (mg) in each serving.  ? Fiber (g) in each serving.  · Choose foods with healthy fats, such as:  ? Monounsaturated fats.  ? Polyunsaturated fats.  ? Omega-3 fats.  · Choose grain products that have whole grains. Look for the word \"whole\" as the first word in the ingredient list.  Cooking  · Cook foods using low-fat methods. These include baking, boiling, grilling, and broiling.  · Eat more home-cooked foods. Eat at restaurants and buffets " less often.  · Avoid cooking using saturated fats, such as butter, cream, palm oil, palm kernel oil, and coconut oil.  Recommended foods    Fruits  · All fresh, canned (in natural juice), or frozen fruits.  Vegetables  · Fresh or frozen vegetables (raw, steamed, roasted, or grilled). Green salads.  Grains  · Whole grains, such as whole wheat or whole grain breads, crackers, cereals, and pasta. Unsweetened oatmeal, bulgur, barley, quinoa, or brown rice. Corn or whole wheat flour tortillas.  Meats and other protein foods  · Ground beef (85% or leaner), grass-fed beef, or beef trimmed of fat. Skinless chicken or turkey. Ground chicken or turkey. Pork trimmed of fat. All fish and seafood. Egg whites. Dried beans, peas, or lentils. Unsalted nuts or seeds. Unsalted canned beans. Nut butters without added sugar or oil.  Dairy  · Low-fat or nonfat dairy products, such as skim or 1% milk, 2% or reduced-fat cheeses, low-fat and fat-free ricotta or cottage cheese, or plain low-fat and nonfat yogurt.  Fats and oils  · Tub margarine without trans fats. Light or reduced-fat mayonnaise and salad dressings. Avocado. Olive, canola, sesame, or safflower oils.  The items listed above may not be a complete list of foods and beverages you can eat. Contact a dietitian for more information.  Foods to avoid  Fruits  · Canned fruit in heavy syrup. Fruit in cream or butter sauce. Fried fruit.  Vegetables  · Vegetables cooked in cheese, cream, or butter sauce. Fried vegetables.  Grains  · White bread. White pasta. White rice. Cornbread. Bagels, pastries, and croissants. Crackers and snack foods that contain trans fat and hydrogenated oils.  Meats and other protein foods  · Fatty cuts of meat. Ribs, chicken wings, valencia, sausage, bologna, salami, chitterlings, fatback, hot dogs, bratwurst, and packaged lunch meats. Liver and organ meats. Whole eggs and egg yolks. Chicken and turkey with skin. Fried meat.  Dairy  · Whole or 2% milk, cream,  half-and-half, and cream cheese. Whole milk cheeses. Whole-fat or sweetened yogurt. Full-fat cheeses. Nondairy creamers and whipped toppings. Processed cheese, cheese spreads, and cheese curds.  Beverages  · Alcohol. Sugar-sweetened drinks such as sodas, lemonade, and fruit drinks.  Fats and oils  · Butter, stick margarine, lard, shortening, ghee, or valencia fat. Coconut, palm kernel, and palm oils.  Sweets and desserts  · Corn syrup, sugars, honey, and molasses. Candy. Jam and jelly. Syrup. Sweetened cereals. Cookies, pies, cakes, donuts, muffins, and ice cream.  The items listed above may not be a complete list of foods and beverages you should avoid. Contact a dietitian for more information.  Summary  · Choosing the right foods helps keep your fat and cholesterol at normal levels. This can keep you from getting certain diseases.  · At meals, fill one-half of your plate with vegetables and green salads.  · Eat high-fiber foods, like whole grains, beans, apples, carrots, peas, and barley.  · Limit added sugar, saturated fats, alcohol, and fried foods.  This information is not intended to replace advice given to you by your health care provider. Make sure you discuss any questions you have with your health care provider.  Document Released: 06/18/2013 Document Revised: 08/21/2019 Document Reviewed: 09/04/2018  [employer] Interactive Patient Education © 2020 [employer]  BMI for Adults    Body mass index (BMI) is a number that is calculated from a person's weight and height. BMI may help to estimate how much of a person's weight is composed of fat. BMI can help identify those who may be at higher risk for certain medical problems.  How is BMI used with adults?  BMI is used as a screening tool to identify possible weight problems. It is used to check whether a person is obese, overweight, healthy weight, or underweight.  How is BMI calculated?  BMI measures your weight and compares it to your height. This can be done  "either in English ([place] or metric measurements. Note that charts are available to help you find your BMI quickly and easily without having to do these calculations yourself.  To calculate your BMI in English ([place] measurements, your health care provider will:  1. Measure your weight in pounds (lb).  2. Multiply the number of pounds by 703.  ? For example, for a person who weighs 180 lb, multiply that number by 703, which equals 126,540.  3. Measure your height in inches (in). Then multiply that number by itself to get a measurement called \"inches squared.\"  ? For example, for a person who is 70 in tall, the \"inches squared\" measurement is 70 in x 70 in, which equals 4900 inches squared.  4. Divide the total from Step 2 (number of lb x 703) by the total from Step 3 (inches squared): 126,540 ÷ 4900 = 25.8. This is your BMI.  To calculate your BMI in metric measurements, your health care provider will:  1. Measure your weight in kilograms (kg).  2. Measure your height in meters (m). Then multiply that number by itself to get a measurement called \"meters squared.\"  ? For example, for a person who is 1.75 m tall, the \"meters squared\" measurement is 1.75 m x 1.75 m, which is equal to 3.1 meters squared.  3. Divide the number of kilograms (your weight) by the meters squared number. In this example: 70 ÷ 3.1 = 22.6. This is your BMI.  How is BMI interpreted?  To interpret your results, your health care provider will use BMI charts to identify whether you are underweight, normal weight, overweight, or obese. The following guidelines will be used:  · Underweight: BMI less than 18.5.  · Normal weight: BMI between 18.5 and 24.9.  · Overweight: BMI between 25 and 29.9.  · Obese: BMI of 30 and above.  Please note:  · Weight includes both fat and muscle, so someone with a muscular build, such as an athlete, may have a BMI that is higher than 24.9. In cases like these, BMI is not an accurate measure of body fat.  · To determine " if excess body fat is the cause of a BMI of 25 or higher, further assessments may need to be done by a health care provider.  · BMI is usually interpreted in the same way for men and women.  Why is BMI a useful tool?  BMI is useful in two ways:  · Identifying a weight problem that may be related to a medical condition, or that may increase the risk for medical problems.  · Promoting lifestyle and diet changes in order to reach a healthy weight.  Summary  · Body mass index (BMI) is a number that is calculated from a person's weight and height.  · BMI may help to estimate how much of a person's weight is composed of fat. BMI can help identify those who may be at higher risk for certain medical problems.  · BMI can be measured using English measurements or metric measurements.  · To interpret your results, your health care provider will use BMI charts to identify whether you are underweight, normal weight, overweight, or obese.  This information is not intended to replace advice given to you by your health care provider. Make sure you discuss any questions you have with your health care provider.  Document Released: 08/29/2005 Document Revised: 10/31/2018 Document Reviewed: 10/31/2018  [employer] Interactive Patient Education © 2020 [employer]

## 2020-03-05 ENCOUNTER — OUTSIDE FACILITY SERVICE (OUTPATIENT)
Dept: CARDIOLOGY | Facility: CLINIC | Age: 50
End: 2020-03-05

## 2020-03-05 PROCEDURE — 93458 L HRT ARTERY/VENTRICLE ANGIO: CPT | Performed by: INTERNAL MEDICINE

## 2020-03-10 ENCOUNTER — PATIENT MESSAGE (OUTPATIENT)
Dept: CARDIOLOGY | Facility: CLINIC | Age: 50
End: 2020-03-10

## 2020-03-10 NOTE — TELEPHONE ENCOUNTER
Verbally called patient after I was notified he was upset. Patient denies any issues at this time, stated he was aggravated however, he does not want to cancel his follow up as scheduled. Patient was notified if any issues call office. Patient verbalized understanding and had no further questions at this time. -NESS;LATOYA

## 2020-09-30 ENCOUNTER — HOSPITAL ENCOUNTER (OUTPATIENT)
Dept: GENERAL RADIOLOGY | Facility: HOSPITAL | Age: 50
Discharge: HOME | End: 2020-09-30
Admitting: UROLOGY

## 2020-09-30 ENCOUNTER — OFFICE VISIT (OUTPATIENT)
Dept: UROLOGY | Facility: CLINIC | Age: 50
End: 2020-09-30

## 2020-09-30 VITALS — HEIGHT: 72 IN | TEMPERATURE: 98.2 F | BODY MASS INDEX: 39.42 KG/M2 | WEIGHT: 291 LBS

## 2020-09-30 DIAGNOSIS — R10.9 FLANK PAIN: ICD-10-CM

## 2020-09-30 DIAGNOSIS — R10.9 ACUTE RIGHT FLANK PAIN: Primary | ICD-10-CM

## 2020-09-30 PROCEDURE — 99214 OFFICE O/P EST MOD 30 MIN: CPT | Performed by: UROLOGY

## 2020-09-30 PROCEDURE — 74018 RADEX ABDOMEN 1 VIEW: CPT

## 2020-09-30 PROCEDURE — 74018 RADEX ABDOMEN 1 VIEW: CPT | Performed by: RADIOLOGY

## 2020-09-30 RX ORDER — PROMETHAZINE HYDROCHLORIDE 12.5 MG/1
25 TABLET ORAL EVERY 8 HOURS PRN
Qty: 30 TABLET | Refills: 1 | Status: ON HOLD | OUTPATIENT
Start: 2020-09-30 | End: 2021-01-27

## 2020-09-30 RX ORDER — OXYCODONE HYDROCHLORIDE AND ACETAMINOPHEN 5; 325 MG/1; MG/1
TABLET ORAL
Qty: 28 TABLET | Refills: 0 | Status: ON HOLD | OUTPATIENT
Start: 2020-09-30 | End: 2021-01-27

## 2020-09-30 RX ORDER — TAMSULOSIN HYDROCHLORIDE 0.4 MG/1
1 CAPSULE ORAL NIGHTLY
Qty: 30 CAPSULE | Refills: 11 | Status: ON HOLD | OUTPATIENT
Start: 2020-09-30 | End: 2021-01-27

## 2020-09-30 NOTE — PROGRESS NOTES
Chief Complaint:          Right flank pain    HPI:   50 y.o. male.  Pt's pain started last night and it has waxed and waned.  Pt's kub did not show an obvious stone  HPI  Pt has not had any fevers or chills but he has vomited last night.    Past Medical History:        Past Medical History:   Diagnosis Date   • Abnormal ECG    • Alcohol liver damage (CMS/HCC)    • Anxiety    • Arrhythmia    • Atrial fibrillation (CMS/HCC)    • Benign hypertension    • Bipolar depression (CMS/HCC)    • CHF (congestive heart failure) (CMS/HCC)    • Cirrhosis of liver (CMS/HCC)    • Clotting disorder (CMS/HCC)    • Coronary artery disease    • DVT (deep venous thrombosis) (CMS/HCC)     Questionable history of deep venous thrombosis.   • Dyslipidemia    • Hyperlipidemia    • Myocardial infarction (CMS/HCC)     x 2   • Obesity    • JIM on CPAP     Obstructive sleep apnea, compliant with CPAP.    • Type 2 diabetes mellitus (CMS/HCC)    • Valvular disease          Current Meds:     Current Outpatient Medications   Medication Sig Dispense Refill   • aspirin 81 MG tablet Take 1 tablet by mouth Daily. 30 tablet 11   • ELIQUIS 5 MG tablet tablet TAKE 1 TABLET TWO TIMES A DAY AS DIRECTED BY YOUR PRESCRIBER FOR BLOOD THINNER 60 tablet 5   • furosemide (LASIX) 40 MG tablet TAKE 1 TABLET EVERY MORNING FOR FLUID AND BLOOD PRESSURE 30 tablet 11   • Insulin Glargine (BASAGLAR KWIKPEN) 100 UNIT/ML injection pen Per SS  3   • lithium carbonate 300 MG capsule Take 300 mg by mouth 5 (Five) Times a Day.  2   • nitroglycerin (NITROSTAT) 0.4 MG SL tablet PLACE 1 TABLET UNDER THE TONGUE EVERY 5 MINUTES UP TO 3 TABLETS IN 15 MINUTES FOR CHEST PAIN. IF NO RELIEF GO TO THE EMERGENCY ROOM OR CALL 25 tablet 3   • NOVOLOG MIX 70/30 FLEXPEN (70-30) 100 UNIT/ML suspension pen-injector injection Inject 40 Units under the skin into the appropriate area as directed 2 (Two) Times a Day Before Meals.  2   • sertraline (ZOLOFT) 100 MG tablet Take 200 mg by mouth Daily.  2    • spironolactone (ALDACTONE) 50 MG tablet Take 1 tablet by mouth Daily. 90 tablet 1   • oxyCODONE-acetaminophen (Percocet) 5-325 MG per tablet 1 to 2 Tablets Every 6 Hours as needed for PAIN 28 tablet 0   • promethazine (PHENERGAN) 12.5 MG tablet Take 2 tablets by mouth Every 8 (Eight) Hours As Needed for Nausea or Vomiting. 30 tablet 1   • tamsulosin (Flomax) 0.4 MG capsule 24 hr capsule Take 1 capsule by mouth Every Night. 30 capsule 11     No current facility-administered medications for this visit.         Allergies:      Allergies   Allergen Reactions   • Beta Adrenergic Blockers Other (See Comments)     profound blood pressure drops.   • Calcium Channel Blockers Other (See Comments)     profound blood pressure drops.   • Imdur [Isosorbide Dinitrate] Other (See Comments)     Headaches  Patient states his blood pressure drops profoundly?        Past Surgical History:     Past Surgical History:   Procedure Laterality Date   • ANAL FISTULA REPAIR     • CARDIAC CATHETERIZATION     • CARDIAC CATHETERIZATION     • CAROTID STENT     • CHOLECYSTECTOMY     • CORONARY ANGIOPLASTY WITH STENT PLACEMENT Left 2015    Persistent cardiac symptoms with cardiac catheterization, Dr. Moss, with PTCA and stenting of the mid-LAD, 2015.   • GANGLION CYST EXCISION      left knee   • PYLOROMYOTOMY           Social History:     Social History     Socioeconomic History   • Marital status:      Spouse name: Not on file   • Number of children: Not on file   • Years of education: Not on file   • Highest education level: Not on file   Tobacco Use   • Smoking status: Former Smoker     Packs/day: 1.00     Years: 15.00     Pack years: 15.00     Types: Cigarettes     Start date: 1985     Quit date:      Years since quittin.7   • Smokeless tobacco: Never Used   • Tobacco comment: Quit 12 years ago; smoked 17 years at 1 PPD   Substance and Sexual Activity   • Alcohol use: No     Comment: Quit 12 years ago; drank  a fifth a day for 20 years   • Drug use: No   • Sexual activity: Yes     Partners: Female     Birth control/protection: None       Family History:     Family History   Problem Relation Age of Onset   • Heart disease Father         CAD, 25 year old   • Nephrolithiasis Father    • Heart attack Father    • Atrial fibrillation Mother    • Arrhythmia Mother    • No Known Problems Sister        Review of Systems:     Review of Systems   Constitutional: Negative for chills and fever.   HENT: Negative for sinus pressure.    Respiratory: Negative for cough.    Cardiovascular: Negative for leg swelling.   Gastrointestinal: Positive for abdominal pain.   Genitourinary: Positive for decreased urine volume, difficulty urinating, flank pain, frequency and urgency. Negative for dysuria.   Musculoskeletal: Positive for back pain.   Neurological: Negative for headaches.   Psychiatric/Behavioral: The patient is not nervous/anxious.          Physical Exam:     Physical Exam  Vitals signs and nursing note reviewed.   HENT:      Head: Normocephalic and atraumatic.      Right Ear: External ear normal.      Left Ear: External ear normal.      Nose: Nose normal.   Eyes:      Conjunctiva/sclera: Conjunctivae normal.      Pupils: Pupils are equal, round, and reactive to light.   Neck:      Musculoskeletal: Normal range of motion and neck supple.      Thyroid: No thyromegaly.   Cardiovascular:      Rate and Rhythm: Normal rate and regular rhythm.      Heart sounds: Normal heart sounds. No murmur.   Pulmonary:      Effort: Pulmonary effort is normal. No respiratory distress.      Breath sounds: Normal breath sounds. No wheezing or rales.   Chest:      Chest wall: No tenderness.   Abdominal:      General: Bowel sounds are normal. There is no distension.      Palpations: Abdomen is soft. There is no mass.      Tenderness: There is no abdominal tenderness.      Hernia: No hernia is present.   Genitourinary:     Penis: Normal.       Prostate:  "Normal.      Rectum: Normal.   Musculoskeletal: Normal range of motion.         General: No tenderness.   Lymphadenopathy:      Cervical: No cervical adenopathy.   Skin:     General: Skin is warm.      Findings: No rash.   Neurological:      Mental Status: He is alert and oriented to person, place, and time.      Cranial Nerves: No cranial nerve deficit.      Motor: No abnormal muscle tone.      Coordination: Coordination normal.   Psychiatric:         Behavior: Behavior normal.         Thought Content: Thought content normal.         Judgment: Judgment normal.         Temp 98.2 °F (36.8 °C)   Ht 182.9 cm (72\")   Wt 132 kg (291 lb)   BMI 39.47 kg/m²    Procedure:         Assessment:     Encounter Diagnoses   Name Primary?   • Flank pain    • Acute right flank pain Yes       Orders Placed This Encounter   Procedures   • XR abdomen kub     Order Specific Question:   Reason for Exam:     Answer:   kidney stone     Order Specific Question:   Does this patient have a diabetic monitoring/medication delivering device on?     Answer:   No   • CT Abdomen Pelvis Stone Protocol     Standing Status:   Future     Standing Expiration Date:   9/30/2021       Patient reports that he is not currently experiencing any symptoms of urinary incontinence.      Plan:   Will get a stone study ct scan and see the pt afterwards.    Patient's Body mass index is 39.47 kg/m². BMI is above normal parameters. Recommendations include: referral to primary care.      Smoking Cessation Counseling:  Never a smoker.  Patient does not currently use any tobacco products.   Counseling was given to patient for the following topics impressions as follows: right flank pain. The interim medical history and current results were reviewed.  A treatment plan with follow-up was made for Acute right flank pain [R10.9]. I spent 33 minutes face to face with Ricardo Crespo and 80 percentage was spent in counseling.       This document has been electronically " signed by Brady Beal MD September 30, 2020 14:34 EDT

## 2020-10-19 RX ORDER — NITROGLYCERIN 0.4 MG/1
TABLET SUBLINGUAL
Qty: 25 TABLET | Refills: 0 | Status: ON HOLD | OUTPATIENT
Start: 2020-10-19 | End: 2021-01-27

## 2020-10-27 ENCOUNTER — OUTSIDE FACILITY SERVICE (OUTPATIENT)
Dept: CARDIOLOGY | Facility: CLINIC | Age: 50
End: 2020-10-27

## 2020-10-27 PROCEDURE — 78452 HT MUSCLE IMAGE SPECT MULT: CPT | Performed by: INTERNAL MEDICINE

## 2020-10-27 PROCEDURE — 93018 CV STRESS TEST I&R ONLY: CPT | Performed by: INTERNAL MEDICINE

## 2020-11-10 ENCOUNTER — PRIOR AUTHORIZATION (OUTPATIENT)
Dept: CARDIOLOGY | Facility: CLINIC | Age: 50
End: 2020-11-10

## 2020-11-10 ENCOUNTER — TELEMEDICINE (OUTPATIENT)
Dept: CARDIOLOGY | Facility: CLINIC | Age: 50
End: 2020-11-10

## 2020-11-10 DIAGNOSIS — I25.10 CORONARY ARTERY DISEASE INVOLVING NATIVE CORONARY ARTERY OF NATIVE HEART WITHOUT ANGINA PECTORIS: Primary | ICD-10-CM

## 2020-11-10 DIAGNOSIS — R06.02 SHORTNESS OF BREATH: ICD-10-CM

## 2020-11-10 DIAGNOSIS — I48.0 PAROXYSMAL ATRIAL FIBRILLATION (HCC): ICD-10-CM

## 2020-11-10 DIAGNOSIS — R07.9 CHEST PAIN, UNSPECIFIED TYPE: ICD-10-CM

## 2020-11-10 PROCEDURE — 99441 PR PHYS/QHP TELEPHONE EVALUATION 5-10 MIN: CPT | Performed by: PHYSICIAN ASSISTANT

## 2020-11-10 NOTE — PROGRESS NOTES
Subjective   Ricardo Crespo is a 50 y.o. male   You have chosen to receive care through a telephone visit. Do you consent to use a telephone visit for your medical care today? Yes    CC  hosp follow up , chest pain  No chief complaint on file.  PROBLEM LIST:      1. Coronary artery disease  1.1. Stenting of the LAD, 11/2015, per Dr. Moss.  1.2. Repeat catheterization 2-3 weeks after stenting as above, indicating patent stent with 40% PDA stenosis. Medical management was recommended.  1.3. Repeat catheterization, 01/2016, indicating nonobstructive disease with patent stent. Medical management was recommended.  1.4.  Stenting of the PDA, 03/2017, in the setting of low-level symptoms and abnormal stress test.  Previous stent to the LAD was patent.  The patient had nonobstructive disease otherwise.  Medical management was recommended.  1.5.  Stenting of the mid RCA and PTCA only of the distal LAD, 09/17.  1.6.  Repeat LHC, 11/17, in the setting of acute chest pain.  The patient had 50% LAD stenosis, patent stents, and non-obstructive CAD otherwise.  1. 7 repeat left heart catheter March 2018 with stenting of the LAD ×2.  Nonobstructive disease otherwise and patent stent with medical management recommended  1.8 left heart catheter 10/16/18-30-40% LAD, 40% apical branch, 10% circumflex left, right coronary artery 10%, EF 60%  1.9 LHC, 2-8-19, EF 55-60%, demonstrated widely patent epicardial coronary arteries with diffuse non-obstructive disease with empiric therapy for ischemia and risk factor manag.  1.9 1 repeat cardiac catheterization March 2020 demonstrating nonobstructive disease.  Most notable distal RCA and circumflex disease.  70 to 80% distal circumflex disease with less than 2 mm vessel.  Distal RCA disease at 50%.  Medical meds recommended   1.10 stress test October 2020 demonstrating inferior ischemia with preserved LV function  2. Preserved systolic function  3. Hypertension  4. Dyslipidemia  5. Diabetes  mellitus  6. Sleep apnea  7. Reported history of DVT, Eliquis   8. Atrial Fibrillation QOEUG0Vism = 2, on Eliquis     HPI    Patient is a 50-year-old male that is being interviewed telephonically due to the recent global pandemic.  [facility] but because of technical issues, telephone visit had to be instituted.    Patient went to the hospital with complaints of chest pain.  He describes to me that he has had issues with atrial fibrillation having paroxysmal episodes that seem to correlate with his chest discomfort.  When he went to the hospital, he was admitted.  He had stress test performed demonstrating inferior ischemia.  However anatomy based on recent catheterization did not fail to warrant catheterization to be repeated in the hospital.    He has felt well with exception of palpitations.  When he feels fluttering he will have chest discomfort and that has been his main complaint.  Of course he has mild to moderate stable levels of dyspnea.  This has not been progressive or changing.  He does not describe PND orthopnea.    He does have mild lower extremity edema but overall he feels well.  His complaint is discomfort and palpitations.    Current Outpatient Medications   Medication Sig Dispense Refill   • aspirin 81 MG tablet Take 1 tablet by mouth Daily. 30 tablet 11   • dronedarone (Multaq) 400 MG tablet Take 1 tablet by mouth 2 (Two) Times a Day With Meals. 60 tablet 6   • ELIQUIS 5 MG tablet tablet TAKE 1 TABLET TWO TIMES A DAY AS DIRECTED BY YOUR PRESCRIBER FOR BLOOD THINNER 60 tablet 5   • furosemide (LASIX) 40 MG tablet TAKE 1 TABLET EVERY MORNING FOR FLUID AND BLOOD PRESSURE 30 tablet 11   • Insulin Glargine (BASAGLAR KWIKPEN) 100 UNIT/ML injection pen Per SS  3   • lithium carbonate 300 MG capsule Take 300 mg by mouth 5 (Five) Times a Day.  2   • nitroglycerin (NITROSTAT) 0.4 MG SL tablet PLACE 1 TABLET UNDER THE TONGUE EVERY 5 MINUTES UP TO 3 TABLETS IN 15 MINUTES  FOR CHEST PAIN. IF NO RELIEF GO TO THE EMERGENCY ROOM OR CALL 25 tablet 0   • NOVOLOG MIX 70/30 FLEXPEN (70-30) 100 UNIT/ML suspension pen-injector injection Inject 40 Units under the skin into the appropriate area as directed 2 (Two) Times a Day Before Meals.  2   • oxyCODONE-acetaminophen (Percocet) 5-325 MG per tablet 1 to 2 Tablets Every 6 Hours as needed for PAIN 28 tablet 0   • oxyCODONE-acetaminophen (Percocet) 5-325 MG per tablet 1 to 2 Tablets Every 6 Hours as needed for PAIN 28 tablet 0   • promethazine (PHENERGAN) 12.5 MG tablet Take 2 tablets by mouth Every 8 (Eight) Hours As Needed for Nausea or Vomiting. 30 tablet 1   • sertraline (ZOLOFT) 100 MG tablet Take 200 mg by mouth Daily.  2   • spironolactone (ALDACTONE) 50 MG tablet Take 1 tablet by mouth Daily. 90 tablet 1   • tamsulosin (Flomax) 0.4 MG capsule 24 hr capsule Take 1 capsule by mouth Every Night. 30 capsule 11     No current facility-administered medications for this visit.        Beta adrenergic blockers, Calcium channel blockers, and Imdur [isosorbide dinitrate]    Past Medical History:   Diagnosis Date   • Abnormal ECG    • Alcohol liver damage (CMS/HCC)    • Anxiety    • Arrhythmia    • Atrial fibrillation (CMS/HCC)    • Benign hypertension    • Bipolar depression (CMS/HCC)    • CHF (congestive heart failure) (CMS/HCC)    • Cirrhosis of liver (CMS/HCC)    • Clotting disorder (CMS/HCC)    • Coronary artery disease    • DVT (deep venous thrombosis) (CMS/HCC)     Questionable history of deep venous thrombosis.   • Dyslipidemia    • Hyperlipidemia    • Myocardial infarction (CMS/HCC)     x 2   • Obesity    • JIM on CPAP     Obstructive sleep apnea, compliant with CPAP.    • Type 2 diabetes mellitus (CMS/HCC)    • Valvular disease        Social History     Socioeconomic History   • Marital status:      Spouse name: Not on file   • Number of children: Not on file   • Years of education: Not on file   • Highest education level: Not on  file   Tobacco Use   • Smoking status: Former Smoker     Packs/day: 1.00     Years: 15.00     Pack years: 15.00     Types: Cigarettes     Start date: 1985     Quit date: 2008     Years since quittin.8   • Smokeless tobacco: Never Used   • Tobacco comment: Quit 12 years ago; smoked 17 years at 1 PPD   Substance and Sexual Activity   • Alcohol use: No     Comment: Quit 12 years ago; drank a fifth a day for 20 years   • Drug use: No   • Sexual activity: Yes     Partners: Female     Birth control/protection: None       Family History   Problem Relation Age of Onset   • Heart disease Father         CAD, 25 year old   • Nephrolithiasis Father    • Heart attack Father    • Atrial fibrillation Mother    • Arrhythmia Mother    • No Known Problems Sister        Review of Systems   Constitutional: Positive for fatigue.   Respiratory: Positive for shortness of breath.    Cardiovascular: Positive for chest pain and palpitations.   Neurological: Negative for syncope.       Objective   There were no vitals filed for this visit.   There were no vitals taken for this visit.    Lab Results (most recent)     None          Physical Exam    Procedure   Procedures         Assessment/Plan     Problems Addressed this Visit        Cardiovascular and Mediastinum    Coronary artery disease involving native coronary artery of native heart without angina pectoris - Primary    Paroxysmal atrial fibrillation (CMS/HCC)       Respiratory    Shortness of breath       Nervous and Auditory    Chest pain      Diagnoses       Codes Comments    Coronary artery disease involving native coronary artery of native heart without angina pectoris    -  Primary ICD-10-CM: I25.10  ICD-9-CM: 414.01     Paroxysmal atrial fibrillation (CMS/HCC)     ICD-10-CM: I48.0  ICD-9-CM: 427.31     Chest pain, unspecified type     ICD-10-CM: R07.9  ICD-9-CM: 786.50     Shortness of breath     ICD-10-CM: R06.02  ICD-9-CM: 786.05           Recommendation  1.  Patient  with coronary artery disease with inferior ischemia on stress testing but catheterization performed in March demonstrated approximately 50% distal PDA disease.  Patient does not have chest discomfort except when he has palpitations.  He has paroxysmal atrial fibrillation and is not on rhythm control therapy    2.  I would like to start him on Multaq.  He has good LV function with no history of failure he is to take this twice a day with meals.  Hopefully we will see his symptoms improve after taking medications to control his atrial fibrillation    3.  If not, if he feels titrated medical therapy to help with chest pain, we may have to consider repeat catheterization.  For now he would like treatment of his atrial fibrillation and see us back for follow-up.  Therefore we will set patient up with medication and see him back for follow-up in a few weeks.  Any chest pain, noticeable nitroglycerin, he is to go to the ER.  He is to follow with primary as scheduled    This visit has been rescheduled as a phone visit to comply with patient safety concerns in accordance with CDC recommendations. Total time of discussion was approximately 5 minutes.         Electronically signed by:

## 2020-11-10 NOTE — TELEPHONE ENCOUNTER
Multaq PA Case: [id number]  Status: Approved  Coverage Starts on: 11/10/2020   Coverage Ends on: 11/10/2021  Lynn Rangel MA

## 2020-12-25 ENCOUNTER — HOSPITAL ENCOUNTER (INPATIENT)
Dept: HOSPITAL 79 - ER1 | Age: 50
LOS: 6 days | Discharge: HOME | DRG: 177 | End: 2020-12-31
Attending: INTERNAL MEDICINE | Admitting: INTERNAL MEDICINE
Payer: COMMERCIAL

## 2020-12-25 VITALS — HEIGHT: 73 IN | WEIGHT: 268 LBS | BODY MASS INDEX: 35.52 KG/M2

## 2020-12-25 DIAGNOSIS — K74.60: ICD-10-CM

## 2020-12-25 DIAGNOSIS — Z79.82: ICD-10-CM

## 2020-12-25 DIAGNOSIS — U07.1: Primary | ICD-10-CM

## 2020-12-25 DIAGNOSIS — Z88.8: ICD-10-CM

## 2020-12-25 DIAGNOSIS — S09.90XA: ICD-10-CM

## 2020-12-25 DIAGNOSIS — Z87.891: ICD-10-CM

## 2020-12-25 DIAGNOSIS — E66.9: ICD-10-CM

## 2020-12-25 DIAGNOSIS — I48.0: ICD-10-CM

## 2020-12-25 DIAGNOSIS — J96.00: ICD-10-CM

## 2020-12-25 DIAGNOSIS — Z79.01: ICD-10-CM

## 2020-12-25 DIAGNOSIS — I87.8: ICD-10-CM

## 2020-12-25 DIAGNOSIS — F39: ICD-10-CM

## 2020-12-25 DIAGNOSIS — R19.7: ICD-10-CM

## 2020-12-25 DIAGNOSIS — Z90.49: ICD-10-CM

## 2020-12-25 DIAGNOSIS — D69.59: ICD-10-CM
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
## 2020-12-25 DIAGNOSIS — G47.33: ICD-10-CM

## 2020-12-25 DIAGNOSIS — J12.89: ICD-10-CM

## 2020-12-25 DIAGNOSIS — Z95.5: ICD-10-CM

## 2020-12-25 DIAGNOSIS — I50.9: ICD-10-CM

## 2020-12-25 DIAGNOSIS — E11.65: ICD-10-CM

## 2020-12-25 DIAGNOSIS — I11.0: ICD-10-CM
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
## 2020-12-25 DIAGNOSIS — I25.10: ICD-10-CM

## 2020-12-25 DIAGNOSIS — Z86.718: ICD-10-CM

## 2020-12-25 DIAGNOSIS — E78.5: ICD-10-CM

## 2020-12-25 DIAGNOSIS — E87.6: ICD-10-CM

## 2020-12-25 LAB
BUN/CREATININE RATIO: 18 (ref 0–10)
HGB BLD-MCNC: 15.4 GM/DL (ref 14–17.5)
RED BLOOD COUNT: 5.58 M/UL (ref 4.2–5.5)
WHITE BLOOD COUNT: 6.3 K/UL (ref 4.5–11)

## 2020-12-26 LAB
BUN/CREATININE RATIO: 20 (ref 0–10)
HGB BLD-MCNC: 14.3 GM/DL (ref 14–17.5)
RED BLOOD COUNT: 5.35 M/UL (ref 4.2–5.5)
WHITE BLOOD COUNT: 6.5 K/UL (ref 4.5–11)

## 2020-12-26 PROCEDURE — XW13325 TRANSFUSION OF CONVALESCENT PLASMA (NONAUTOLOGOUS) INTO PERIPHERAL VEIN, PERCUTANEOUS APPROACH, NEW TECHNOLOGY GROUP 5: ICD-10-PCS | Performed by: INTERNAL MEDICINE

## 2020-12-26 PROCEDURE — XW033E5 INTRODUCTION OF REMDESIVIR ANTI-INFECTIVE INTO PERIPHERAL VEIN, PERCUTANEOUS APPROACH, NEW TECHNOLOGY GROUP 5: ICD-10-PCS | Performed by: INTERNAL MEDICINE

## 2020-12-26 PROCEDURE — 8E0ZXY6 ISOLATION: ICD-10-PCS | Performed by: INTERNAL MEDICINE

## 2020-12-27 NOTE — NUR
7837 Patient informed that Dr Fiore would talk to him about his pain meds
     when he made rounds.  Patient stated he was not taking anything else
     until he got else for his pain. Stated this is rediculous.  Also
     stated we had been ignoring him & he needed ice.  Ice delivered to
     him.  When asked if he needed anything else, he said no.

## 2020-12-28 LAB
BUN/CREATININE RATIO: 14 (ref 0–10)
HGB BLD-MCNC: 13 GM/DL (ref 14–17.5)
RED BLOOD COUNT: 4.85 M/UL (ref 4.2–5.5)
WHITE BLOOD COUNT: 4.4 K/UL (ref 4.5–11)

## 2020-12-29 LAB
BUN/CREATININE RATIO: 15 (ref 0–10)
HGB BLD-MCNC: 13.2 GM/DL (ref 14–17.5)
RED BLOOD COUNT: 4.84 M/UL (ref 4.2–5.5)
WHITE BLOOD COUNT: 5.3 K/UL (ref 4.5–11)

## 2020-12-30 LAB
BUN/CREATININE RATIO: 15 (ref 0–10)
HGB BLD-MCNC: 13.1 GM/DL (ref 14–17.5)
RED BLOOD COUNT: 4.85 M/UL (ref 4.2–5.5)
WHITE BLOOD COUNT: 5.5 K/UL (ref 4.5–11)

## 2020-12-30 NOTE — NUR
patient called out to let us know he had fell into the door. I went to
patients room assessed his injuries. patient had ared place on his head and on
his right knee. patient said that he was going to the bathroom when his sock
got stuck in his iv pole wheel, he said he tried to catch himself with the
pole but it has wheels on it and it didnt help much. dr. montoya was notified
and no new orders were recieved. he did however say he would come to see the
patient at 5 pm.

## 2021-01-24 ENCOUNTER — HOSPITAL ENCOUNTER (EMERGENCY)
Dept: HOSPITAL 79 - ER1 | Age: 51
LOS: 1 days | Discharge: LEFT BEFORE DISCHARGE | End: 2021-01-25
Payer: COMMERCIAL

## 2021-01-24 DIAGNOSIS — R07.89: Primary | ICD-10-CM

## 2021-01-24 DIAGNOSIS — R68.84: ICD-10-CM

## 2021-01-24 DIAGNOSIS — Z53.21: ICD-10-CM

## 2021-01-24 DIAGNOSIS — R10.9: ICD-10-CM

## 2021-01-24 LAB
HGB BLD-MCNC: 15.2 GM/DL (ref 14–17.5)
RED BLOOD COUNT: 5.43 M/UL (ref 4.2–5.5)
WHITE BLOOD COUNT: 13.1 K/UL (ref 4.5–11)

## 2021-01-27 ENCOUNTER — APPOINTMENT (OUTPATIENT)
Dept: GENERAL RADIOLOGY | Facility: HOSPITAL | Age: 51
End: 2021-01-27

## 2021-01-27 ENCOUNTER — TELEPHONE (OUTPATIENT)
Dept: CARDIOLOGY | Facility: CLINIC | Age: 51
End: 2021-01-27

## 2021-01-27 ENCOUNTER — HOSPITAL ENCOUNTER (INPATIENT)
Facility: HOSPITAL | Age: 51
LOS: 2 days | Discharge: HOME | End: 2021-01-29
Attending: EMERGENCY MEDICINE | Admitting: INTERNAL MEDICINE

## 2021-01-27 DIAGNOSIS — R07.9 CHEST PAIN, UNSPECIFIED TYPE: Primary | ICD-10-CM

## 2021-01-27 DIAGNOSIS — I20.8 STABLE ANGINA PECTORIS (HCC): ICD-10-CM

## 2021-01-27 LAB
ALBUMIN SERPL-MCNC: 4.6 G/DL (ref 3.5–5.2)
ALBUMIN/GLOB SERPL: 1.3 G/DL
ALP SERPL-CCNC: 104 U/L (ref 39–117)
ALT SERPL W P-5'-P-CCNC: 28 U/L (ref 1–41)
ANION GAP SERPL CALCULATED.3IONS-SCNC: 10.7 MMOL/L (ref 5–15)
APTT PPP: 31.7 SECONDS (ref 25.6–35.3)
AST SERPL-CCNC: 32 U/L (ref 1–40)
BASOPHILS # BLD AUTO: 0.15 10*3/MM3 (ref 0–0.2)
BASOPHILS NFR BLD AUTO: 1.3 % (ref 0–1.5)
BILIRUB SERPL-MCNC: 1 MG/DL (ref 0–1.2)
BILIRUB UR QL STRIP: NEGATIVE
BUN SERPL-MCNC: 16 MG/DL (ref 6–20)
BUN/CREAT SERPL: 15.8 (ref 7–25)
CALCIUM SPEC-SCNC: 9.6 MG/DL (ref 8.6–10.5)
CHLORIDE SERPL-SCNC: 88 MMOL/L (ref 98–107)
CLARITY UR: CLEAR
CO2 SERPL-SCNC: 29.3 MMOL/L (ref 22–29)
COLOR UR: YELLOW
CREAT SERPL-MCNC: 1.01 MG/DL (ref 0.76–1.27)
D DIMER PPP FEU-MCNC: <0.27 MCGFEU/ML (ref 0–0.5)
DEPRECATED RDW RBC AUTO: 36.2 FL (ref 37–54)
EOSINOPHIL # BLD AUTO: 0.4 10*3/MM3 (ref 0–0.4)
EOSINOPHIL NFR BLD AUTO: 3.4 % (ref 0.3–6.2)
ERYTHROCYTE [DISTWIDTH] IN BLOOD BY AUTOMATED COUNT: 13.5 % (ref 12.3–15.4)
FLUAV RNA RESP QL NAA+PROBE: NOT DETECTED
FLUBV RNA RESP QL NAA+PROBE: NOT DETECTED
GFR SERPL CREATININE-BSD FRML MDRD: 78 ML/MIN/1.73
GLOBULIN UR ELPH-MCNC: 3.6 GM/DL
GLUCOSE BLDC GLUCOMTR-MCNC: 252 MG/DL (ref 70–130)
GLUCOSE SERPL-MCNC: 309 MG/DL (ref 65–99)
GLUCOSE UR STRIP-MCNC: ABNORMAL MG/DL
HCT VFR BLD AUTO: 41.4 % (ref 37.5–51)
HGB BLD-MCNC: 15 G/DL (ref 13–17.7)
HGB UR QL STRIP.AUTO: NEGATIVE
HOLD SPECIMEN: NORMAL
HOLD SPECIMEN: NORMAL
IMM GRANULOCYTES # BLD AUTO: 0.04 10*3/MM3 (ref 0–0.05)
IMM GRANULOCYTES NFR BLD AUTO: 0.3 % (ref 0–0.5)
INR PPP: 1.29 (ref 0.9–1.1)
KETONES UR QL STRIP: NEGATIVE
LEUKOCYTE ESTERASE UR QL STRIP.AUTO: NEGATIVE
LYMPHOCYTES # BLD AUTO: 1.74 10*3/MM3 (ref 0.7–3.1)
LYMPHOCYTES NFR BLD AUTO: 14.8 % (ref 19.6–45.3)
MAGNESIUM SERPL-MCNC: 1.7 MG/DL (ref 1.6–2.6)
MCH RBC QN AUTO: 27.4 PG (ref 26.6–33)
MCHC RBC AUTO-ENTMCNC: 36.2 G/DL (ref 31.5–35.7)
MCV RBC AUTO: 75.5 FL (ref 79–97)
MONOCYTES # BLD AUTO: 0.67 10*3/MM3 (ref 0.1–0.9)
MONOCYTES NFR BLD AUTO: 5.7 % (ref 5–12)
NEUTROPHILS NFR BLD AUTO: 74.5 % (ref 42.7–76)
NEUTROPHILS NFR BLD AUTO: 8.75 10*3/MM3 (ref 1.7–7)
NITRITE UR QL STRIP: NEGATIVE
NRBC BLD AUTO-RTO: 0 /100 WBC (ref 0–0.2)
PH UR STRIP.AUTO: 6 [PH] (ref 5–8)
PLATELET # BLD AUTO: 103 10*3/MM3 (ref 140–450)
PMV BLD AUTO: 11.3 FL (ref 6–12)
POTASSIUM SERPL-SCNC: 2.5 MMOL/L (ref 3.5–5.2)
PROT SERPL-MCNC: 8.2 G/DL (ref 6–8.5)
PROT UR QL STRIP: NEGATIVE
PROTHROMBIN TIME: 16 SECONDS (ref 11.9–14.1)
QT INTERVAL: 430 MS
QT INTERVAL: 440 MS
QTC INTERVAL: 532 MS
QTC INTERVAL: 540 MS
RBC # BLD AUTO: 5.48 10*6/MM3 (ref 4.14–5.8)
SARS-COV-2 RNA RESP QL NAA+PROBE: NOT DETECTED
SODIUM SERPL-SCNC: 128 MMOL/L (ref 136–145)
SP GR UR STRIP: 1.02 (ref 1–1.03)
TROPONIN T SERPL-MCNC: <0.01 NG/ML (ref 0–0.03)
TSH SERPL DL<=0.05 MIU/L-ACNC: 2.19 UIU/ML (ref 0.27–4.2)
UROBILINOGEN UR QL STRIP: ABNORMAL
WBC # BLD AUTO: 11.75 10*3/MM3 (ref 3.4–10.8)
WHOLE BLOOD HOLD SPECIMEN: NORMAL
WHOLE BLOOD HOLD SPECIMEN: NORMAL

## 2021-01-27 PROCEDURE — 25010000003 POTASSIUM CHLORIDE 10 MEQ/100ML SOLUTION: Performed by: PHYSICIAN ASSISTANT

## 2021-01-27 PROCEDURE — 85379 FIBRIN DEGRADATION QUANT: CPT | Performed by: PHYSICIAN ASSISTANT

## 2021-01-27 PROCEDURE — 99284 EMERGENCY DEPT VISIT MOD MDM: CPT

## 2021-01-27 PROCEDURE — 81003 URINALYSIS AUTO W/O SCOPE: CPT | Performed by: PHYSICIAN ASSISTANT

## 2021-01-27 PROCEDURE — 25010000002 HEPARIN (PORCINE) PER 1000 UNITS: Performed by: INTERNAL MEDICINE

## 2021-01-27 PROCEDURE — 87636 SARSCOV2 & INF A&B AMP PRB: CPT | Performed by: PHYSICIAN ASSISTANT

## 2021-01-27 PROCEDURE — 93010 ELECTROCARDIOGRAM REPORT: CPT | Performed by: INTERNAL MEDICINE

## 2021-01-27 PROCEDURE — 85025 COMPLETE CBC W/AUTO DIFF WBC: CPT | Performed by: EMERGENCY MEDICINE

## 2021-01-27 PROCEDURE — 25010000002 MORPHINE PER 10 MG: Performed by: EMERGENCY MEDICINE

## 2021-01-27 PROCEDURE — 83735 ASSAY OF MAGNESIUM: CPT | Performed by: INTERNAL MEDICINE

## 2021-01-27 PROCEDURE — 85610 PROTHROMBIN TIME: CPT | Performed by: PHYSICIAN ASSISTANT

## 2021-01-27 PROCEDURE — 80053 COMPREHEN METABOLIC PANEL: CPT | Performed by: EMERGENCY MEDICINE

## 2021-01-27 PROCEDURE — 93005 ELECTROCARDIOGRAM TRACING: CPT | Performed by: EMERGENCY MEDICINE

## 2021-01-27 PROCEDURE — 84443 ASSAY THYROID STIM HORMONE: CPT | Performed by: INTERNAL MEDICINE

## 2021-01-27 PROCEDURE — 99223 1ST HOSP IP/OBS HIGH 75: CPT | Performed by: INTERNAL MEDICINE

## 2021-01-27 PROCEDURE — 71046 X-RAY EXAM CHEST 2 VIEWS: CPT

## 2021-01-27 PROCEDURE — 93005 ELECTROCARDIOGRAM TRACING: CPT | Performed by: PHYSICIAN ASSISTANT

## 2021-01-27 PROCEDURE — 82962 GLUCOSE BLOOD TEST: CPT

## 2021-01-27 PROCEDURE — 84484 ASSAY OF TROPONIN QUANT: CPT | Performed by: INTERNAL MEDICINE

## 2021-01-27 PROCEDURE — 25010000002 ONDANSETRON PER 1 MG: Performed by: INTERNAL MEDICINE

## 2021-01-27 PROCEDURE — 85730 THROMBOPLASTIN TIME PARTIAL: CPT | Performed by: PHYSICIAN ASSISTANT

## 2021-01-27 PROCEDURE — 84484 ASSAY OF TROPONIN QUANT: CPT | Performed by: EMERGENCY MEDICINE

## 2021-01-27 PROCEDURE — 93005 ELECTROCARDIOGRAM TRACING: CPT | Performed by: INTERNAL MEDICINE

## 2021-01-27 PROCEDURE — 71046 X-RAY EXAM CHEST 2 VIEWS: CPT | Performed by: RADIOLOGY

## 2021-01-27 PROCEDURE — 25010000002 MAGNESIUM SULFATE 2 GM/50ML SOLUTION: Performed by: INTERNAL MEDICINE

## 2021-01-27 PROCEDURE — 25010000002 MORPHINE PER 10 MG: Performed by: INTERNAL MEDICINE

## 2021-01-27 RX ORDER — MAGNESIUM SULFATE HEPTAHYDRATE 40 MG/ML
4 INJECTION, SOLUTION INTRAVENOUS AS NEEDED
Status: DISCONTINUED | OUTPATIENT
Start: 2021-01-27 | End: 2021-01-29 | Stop reason: HOSPADM

## 2021-01-27 RX ORDER — ONDANSETRON 2 MG/ML
4 INJECTION INTRAMUSCULAR; INTRAVENOUS EVERY 6 HOURS PRN
Status: DISCONTINUED | OUTPATIENT
Start: 2021-01-27 | End: 2021-01-29 | Stop reason: HOSPADM

## 2021-01-27 RX ORDER — LITHIUM CARBONATE 300 MG/1
600 CAPSULE ORAL NIGHTLY
Status: ON HOLD | COMMUNITY
End: 2021-09-24

## 2021-01-27 RX ORDER — SODIUM CHLORIDE 0.9 % (FLUSH) 0.9 %
10 SYRINGE (ML) INJECTION AS NEEDED
Status: DISCONTINUED | OUTPATIENT
Start: 2021-01-27 | End: 2021-01-29 | Stop reason: HOSPADM

## 2021-01-27 RX ORDER — SODIUM CHLORIDE 9 MG/ML
75 INJECTION, SOLUTION INTRAVENOUS CONTINUOUS
Status: DISCONTINUED | OUTPATIENT
Start: 2021-01-27 | End: 2021-01-29

## 2021-01-27 RX ORDER — ASPIRIN 325 MG
325 TABLET ORAL EVERY MORNING
COMMUNITY
End: 2022-05-16

## 2021-01-27 RX ORDER — POTASSIUM CHLORIDE 7.45 MG/ML
10 INJECTION INTRAVENOUS ONCE
Status: COMPLETED | OUTPATIENT
Start: 2021-01-27 | End: 2021-01-27

## 2021-01-27 RX ORDER — MAGNESIUM SULFATE HEPTAHYDRATE 40 MG/ML
2 INJECTION, SOLUTION INTRAVENOUS AS NEEDED
Status: DISCONTINUED | OUTPATIENT
Start: 2021-01-27 | End: 2021-01-29 | Stop reason: HOSPADM

## 2021-01-27 RX ORDER — ATORVASTATIN CALCIUM 40 MG/1
40 TABLET, FILM COATED ORAL NIGHTLY
Status: DISCONTINUED | OUTPATIENT
Start: 2021-01-27 | End: 2021-01-29 | Stop reason: HOSPADM

## 2021-01-27 RX ORDER — ASPIRIN 81 MG/1
81 TABLET, CHEWABLE ORAL DAILY
Status: DISCONTINUED | OUTPATIENT
Start: 2021-01-27 | End: 2021-01-28 | Stop reason: SDUPTHER

## 2021-01-27 RX ORDER — ASPIRIN 325 MG
325 TABLET ORAL DAILY
Status: CANCELLED | OUTPATIENT
Start: 2021-01-28

## 2021-01-27 RX ORDER — FUROSEMIDE 40 MG/1
40 TABLET ORAL 2 TIMES DAILY
COMMUNITY

## 2021-01-27 RX ORDER — MAGNESIUM SULFATE 1 G/100ML
1 INJECTION INTRAVENOUS AS NEEDED
Status: DISCONTINUED | OUTPATIENT
Start: 2021-01-27 | End: 2021-01-29 | Stop reason: HOSPADM

## 2021-01-27 RX ORDER — RANOLAZINE 1000 MG/1
1000 TABLET, EXTENDED RELEASE ORAL 2 TIMES DAILY
COMMUNITY
End: 2022-04-11

## 2021-01-27 RX ORDER — SODIUM CHLORIDE 0.9 % (FLUSH) 0.9 %
10 SYRINGE (ML) INJECTION EVERY 12 HOURS SCHEDULED
Status: DISCONTINUED | OUTPATIENT
Start: 2021-01-27 | End: 2021-01-29 | Stop reason: HOSPADM

## 2021-01-27 RX ORDER — ASPIRIN 325 MG
325 TABLET ORAL ONCE
Status: DISCONTINUED | OUTPATIENT
Start: 2021-01-27 | End: 2021-01-28

## 2021-01-27 RX ORDER — NITROGLYCERIN 0.4 MG/1
0.4 TABLET SUBLINGUAL
Status: DISCONTINUED | OUTPATIENT
Start: 2021-01-27 | End: 2021-01-29 | Stop reason: HOSPADM

## 2021-01-27 RX ORDER — HEPARIN SODIUM 5000 [USP'U]/ML
5000 INJECTION, SOLUTION INTRAVENOUS; SUBCUTANEOUS EVERY 8 HOURS SCHEDULED
Status: DISCONTINUED | OUTPATIENT
Start: 2021-01-27 | End: 2021-01-28

## 2021-01-27 RX ORDER — NICOTINE POLACRILEX 4 MG
15 LOZENGE BUCCAL
Status: DISCONTINUED | OUTPATIENT
Start: 2021-01-27 | End: 2021-01-28 | Stop reason: SDUPTHER

## 2021-01-27 RX ORDER — MAGNESIUM SULFATE HEPTAHYDRATE 40 MG/ML
2 INJECTION, SOLUTION INTRAVENOUS ONCE
Status: COMPLETED | OUTPATIENT
Start: 2021-01-27 | End: 2021-01-28

## 2021-01-27 RX ORDER — DEXTROSE MONOHYDRATE 25 G/50ML
25 INJECTION, SOLUTION INTRAVENOUS
Status: DISCONTINUED | OUTPATIENT
Start: 2021-01-27 | End: 2021-01-28 | Stop reason: SDUPTHER

## 2021-01-27 RX ORDER — MORPHINE SULFATE 2 MG/ML
2 INJECTION, SOLUTION INTRAMUSCULAR; INTRAVENOUS EVERY 4 HOURS PRN
Status: DISCONTINUED | OUTPATIENT
Start: 2021-01-27 | End: 2021-01-29

## 2021-01-27 RX ADMIN — POTASSIUM CHLORIDE 10 MEQ: 7.46 INJECTION, SOLUTION INTRAVENOUS at 20:44

## 2021-01-27 RX ADMIN — NITROGLYCERIN 1 INCH: 20 OINTMENT TOPICAL at 15:44

## 2021-01-27 RX ADMIN — ASPIRIN 81 MG: 81 TABLET, CHEWABLE ORAL at 20:54

## 2021-01-27 RX ADMIN — HEPARIN SODIUM 5000 UNITS: 5000 INJECTION INTRAVENOUS; SUBCUTANEOUS at 20:55

## 2021-01-27 RX ADMIN — POTASSIUM CHLORIDE 10 MEQ: 7.46 INJECTION, SOLUTION INTRAVENOUS at 22:52

## 2021-01-27 RX ADMIN — POTASSIUM CHLORIDE 10 MEQ: 7.46 INJECTION, SOLUTION INTRAVENOUS at 18:18

## 2021-01-27 RX ADMIN — POTASSIUM CHLORIDE 10 MEQ: 7.46 INJECTION, SOLUTION INTRAVENOUS at 21:49

## 2021-01-27 RX ADMIN — SODIUM CHLORIDE 75 ML/HR: 9 INJECTION, SOLUTION INTRAVENOUS at 23:25

## 2021-01-27 RX ADMIN — MORPHINE SULFATE 2 MG: 2 INJECTION, SOLUTION INTRAMUSCULAR; INTRAVENOUS at 21:50

## 2021-01-27 RX ADMIN — ONDANSETRON 4 MG: 2 INJECTION INTRAMUSCULAR; INTRAVENOUS at 21:49

## 2021-01-27 RX ADMIN — POTASSIUM CHLORIDE 10 MEQ: 7.46 INJECTION, SOLUTION INTRAVENOUS at 16:16

## 2021-01-27 RX ADMIN — POTASSIUM CHLORIDE 10 MEQ: 10 INJECTION, SOLUTION INTRAVENOUS at 17:20

## 2021-01-27 RX ADMIN — MORPHINE SULFATE 4 MG: 4 INJECTION, SOLUTION INTRAMUSCULAR; INTRAVENOUS at 15:22

## 2021-01-27 RX ADMIN — SODIUM CHLORIDE, PRESERVATIVE FREE 10 ML: 5 INJECTION INTRAVENOUS at 20:55

## 2021-01-27 RX ADMIN — MAGNESIUM SULFATE HEPTAHYDRATE 2 G: 40 INJECTION, SOLUTION INTRAVENOUS at 23:26

## 2021-01-27 RX ADMIN — ATORVASTATIN CALCIUM 40 MG: 40 TABLET, FILM COATED ORAL at 20:54

## 2021-01-27 NOTE — TELEPHONE ENCOUNTER
"Pt LVM stating that he's having SoA, CP, and keeps going in and out of AFiB.       Per chart review, pt had a telemedicine appt w/ [facility] on 11/10/20 for CP. Appt note states:  \" I would like to start him on Multaq.  He has good LV function with no history of failure he is to take this twice a day with meals.  Hopefully we will see his symptoms improve after taking medications to control his atrial fibrillation.\" Note states pt was to be scheduled for follow up appt in a few weeks and no follow up was scheduled.   \"Any chest pain, noticeable nitroglycerin, he is to go to the ER.  He is to follow with primary as scheduled.\"       First attempt to reach pt. Left a voicemail for pt to return my call at [phone]. Stated on VM to please go to the hospital ASAP if having CP not resolved by nitro, stated to call back otherwise.   "

## 2021-01-28 ENCOUNTER — APPOINTMENT (OUTPATIENT)
Dept: NUCLEAR MEDICINE | Facility: HOSPITAL | Age: 51
End: 2021-01-28

## 2021-01-28 ENCOUNTER — APPOINTMENT (OUTPATIENT)
Dept: CARDIOLOGY | Facility: HOSPITAL | Age: 51
End: 2021-01-28

## 2021-01-28 ENCOUNTER — APPOINTMENT (OUTPATIENT)
Dept: ULTRASOUND IMAGING | Facility: HOSPITAL | Age: 51
End: 2021-01-28

## 2021-01-28 LAB
ANION GAP SERPL CALCULATED.3IONS-SCNC: 10.4 MMOL/L (ref 5–15)
BASOPHILS # BLD AUTO: 0.09 10*3/MM3 (ref 0–0.2)
BASOPHILS NFR BLD AUTO: 1 % (ref 0–1.5)
BH CV ECHO MEAS - % IVS THICK: 46.9 %
BH CV ECHO MEAS - % LVPW THICK: 47.7 %
BH CV ECHO MEAS - ACS: 2.4 CM
BH CV ECHO MEAS - AO MAX PG: 6.7 MMHG
BH CV ECHO MEAS - AO MEAN PG: 4 MMHG
BH CV ECHO MEAS - AO ROOT AREA (BSA CORRECTED): 1.3
BH CV ECHO MEAS - AO ROOT AREA: 8.3 CM^2
BH CV ECHO MEAS - AO ROOT DIAM: 3.3 CM
BH CV ECHO MEAS - AO V2 MAX: 129 CM/SEC
BH CV ECHO MEAS - AO V2 MEAN: 90.6 CM/SEC
BH CV ECHO MEAS - AO V2 VTI: 27.3 CM
BH CV ECHO MEAS - BSA(HAYCOCK): 2.6 M^2
BH CV ECHO MEAS - BSA: 2.4 M^2
BH CV ECHO MEAS - BZI_BMI: 37.4 KILOGRAMS/M^2
BH CV ECHO MEAS - BZI_METRIC_HEIGHT: 182.9 CM
BH CV ECHO MEAS - BZI_METRIC_WEIGHT: 125.2 KG
BH CV ECHO MEAS - EDV(CUBED): 186.2 ML
BH CV ECHO MEAS - EDV(MOD-SP4): 57.6 ML
BH CV ECHO MEAS - EDV(TEICH): 160.7 ML
BH CV ECHO MEAS - EF(CUBED): 68.7 %
BH CV ECHO MEAS - EF(MOD-SP4): 67.5 %
BH CV ECHO MEAS - EF(TEICH): 59.6 %
BH CV ECHO MEAS - ESV(CUBED): 58.2 ML
BH CV ECHO MEAS - ESV(MOD-SP4): 18.7 ML
BH CV ECHO MEAS - ESV(TEICH): 64.9 ML
BH CV ECHO MEAS - FS: 32.1 %
BH CV ECHO MEAS - IVS/LVPW: 0.95
BH CV ECHO MEAS - IVSD: 1 CM
BH CV ECHO MEAS - IVSS: 1.5 CM
BH CV ECHO MEAS - LA DIMENSION: 3.1 CM
BH CV ECHO MEAS - LA/AO: 0.95
BH CV ECHO MEAS - LV DIASTOLIC VOL/BSA (35-75): 23.6 ML/M^2
BH CV ECHO MEAS - LV MASS(C)D: 245.9 GRAMS
BH CV ECHO MEAS - LV MASS(C)DI: 100.6 GRAMS/M^2
BH CV ECHO MEAS - LV MASS(C)S: 238.2 GRAMS
BH CV ECHO MEAS - LV MASS(C)SI: 97.5 GRAMS/M^2
BH CV ECHO MEAS - LV SYSTOLIC VOL/BSA (12-30): 7.7 ML/M^2
BH CV ECHO MEAS - LVIDD: 5.7 CM
BH CV ECHO MEAS - LVIDS: 3.9 CM
BH CV ECHO MEAS - LVLD AP4: 7.2 CM
BH CV ECHO MEAS - LVLS AP4: 6.7 CM
BH CV ECHO MEAS - LVOT AREA (M): 4.2 CM^2
BH CV ECHO MEAS - LVOT AREA: 4.2 CM^2
BH CV ECHO MEAS - LVOT DIAM: 2.3 CM
BH CV ECHO MEAS - LVPWD: 1.1 CM
BH CV ECHO MEAS - LVPWS: 1.6 CM
BH CV ECHO MEAS - MV A MAX VEL: 71.1 CM/SEC
BH CV ECHO MEAS - MV E MAX VEL: 83.7 CM/SEC
BH CV ECHO MEAS - MV E/A: 1.2
BH CV ECHO MEAS - PA ACC TIME: 0.04 SEC
BH CV ECHO MEAS - PA PR(ACCEL): 61.5 MMHG
BH CV ECHO MEAS - SI(AO): 92.7 ML/M^2
BH CV ECHO MEAS - SI(CUBED): 52.4 ML/M^2
BH CV ECHO MEAS - SI(MOD-SP4): 15.9 ML/M^2
BH CV ECHO MEAS - SI(TEICH): 39.2 ML/M^2
BH CV ECHO MEAS - SV(AO): 226.5 ML
BH CV ECHO MEAS - SV(CUBED): 128 ML
BH CV ECHO MEAS - SV(MOD-SP4): 38.9 ML
BH CV ECHO MEAS - SV(TEICH): 95.8 ML
BH CV NUCLEAR PRIOR STUDY: 3
BH CV STRESS BP STAGE 1: NORMAL
BH CV STRESS BP STAGE 2: NORMAL
BH CV STRESS COMMENTS STAGE 1: NORMAL
BH CV STRESS COMMENTS STAGE 2: NORMAL
BH CV STRESS DOSE REGADENOSON STAGE 1: 0.4
BH CV STRESS DURATION MIN STAGE 1: 0
BH CV STRESS DURATION MIN STAGE 2: 4
BH CV STRESS DURATION SEC STAGE 1: 10
BH CV STRESS DURATION SEC STAGE 2: 0
BH CV STRESS HR STAGE 1: 91
BH CV STRESS HR STAGE 2: 79
BH CV STRESS PROTOCOL 1: NORMAL
BH CV STRESS RECOVERY BP: NORMAL MMHG
BH CV STRESS RECOVERY HR: 79 BPM
BH CV STRESS STAGE 1: 1
BH CV STRESS STAGE 2: 2
BUN SERPL-MCNC: 14 MG/DL (ref 6–20)
BUN/CREAT SERPL: 16.1 (ref 7–25)
CALCIUM SPEC-SCNC: 8.4 MG/DL (ref 8.6–10.5)
CHLORIDE SERPL-SCNC: 95 MMOL/L (ref 98–107)
CHOLEST SERPL-MCNC: 144 MG/DL (ref 0–200)
CO2 SERPL-SCNC: 26.6 MMOL/L (ref 22–29)
CREAT SERPL-MCNC: 0.87 MG/DL (ref 0.76–1.27)
DEPRECATED RDW RBC AUTO: 38.2 FL (ref 37–54)
EOSINOPHIL # BLD AUTO: 0.39 10*3/MM3 (ref 0–0.4)
EOSINOPHIL NFR BLD AUTO: 4.2 % (ref 0.3–6.2)
ERYTHROCYTE [DISTWIDTH] IN BLOOD BY AUTOMATED COUNT: 13.8 % (ref 12.3–15.4)
GFR SERPL CREATININE-BSD FRML MDRD: 92 ML/MIN/1.73
GLUCOSE BLDC GLUCOMTR-MCNC: 318 MG/DL (ref 70–130)
GLUCOSE BLDC GLUCOMTR-MCNC: 326 MG/DL (ref 70–130)
GLUCOSE BLDC GLUCOMTR-MCNC: 338 MG/DL (ref 70–130)
GLUCOSE BLDC GLUCOMTR-MCNC: 351 MG/DL (ref 70–130)
GLUCOSE SERPL-MCNC: 335 MG/DL (ref 65–99)
HBA1C MFR BLD: 11.2 % (ref 4.8–5.6)
HCT VFR BLD AUTO: 36.3 % (ref 37.5–51)
HDLC SERPL-MCNC: 34 MG/DL (ref 40–60)
HGB BLD-MCNC: 12.5 G/DL (ref 13–17.7)
IMM GRANULOCYTES # BLD AUTO: 0.03 10*3/MM3 (ref 0–0.05)
IMM GRANULOCYTES NFR BLD AUTO: 0.3 % (ref 0–0.5)
LDLC SERPL CALC-MCNC: 77 MG/DL (ref 0–100)
LDLC/HDLC SERPL: 2.09 {RATIO}
LITHIUM SERPL-SCNC: 1 MMOL/L (ref 0.6–1.2)
LYMPHOCYTES # BLD AUTO: 1.65 10*3/MM3 (ref 0.7–3.1)
LYMPHOCYTES NFR BLD AUTO: 17.8 % (ref 19.6–45.3)
MAXIMAL PREDICTED HEART RATE: 168 BPM
MAXIMAL PREDICTED HEART RATE: 168 BPM
MCH RBC QN AUTO: 26.8 PG (ref 26.6–33)
MCHC RBC AUTO-ENTMCNC: 34.4 G/DL (ref 31.5–35.7)
MCV RBC AUTO: 77.9 FL (ref 79–97)
MONOCYTES # BLD AUTO: 0.57 10*3/MM3 (ref 0.1–0.9)
MONOCYTES NFR BLD AUTO: 6.2 % (ref 5–12)
NEUTROPHILS NFR BLD AUTO: 6.52 10*3/MM3 (ref 1.7–7)
NEUTROPHILS NFR BLD AUTO: 70.5 % (ref 42.7–76)
NRBC BLD AUTO-RTO: 0 /100 WBC (ref 0–0.2)
PERCENT MAX PREDICTED HR: 54.17 %
PLATELET # BLD AUTO: 75 10*3/MM3 (ref 140–450)
PMV BLD AUTO: 11 FL (ref 6–12)
POTASSIUM SERPL-SCNC: 3 MMOL/L (ref 3.5–5.2)
POTASSIUM SERPL-SCNC: 3 MMOL/L (ref 3.5–5.2)
QT INTERVAL: 444 MS
QT INTERVAL: 454 MS
QT INTERVAL: 474 MS
QTC INTERVAL: 454 MS
QTC INTERVAL: 506 MS
QTC INTERVAL: 511 MS
RBC # BLD AUTO: 4.66 10*6/MM3 (ref 4.14–5.8)
SODIUM SERPL-SCNC: 132 MMOL/L (ref 136–145)
STRESS BASELINE BP: NORMAL MMHG
STRESS BASELINE HR: 75 BPM
STRESS PERCENT HR: 64 %
STRESS POST PEAK BP: NORMAL MMHG
STRESS POST PEAK HR: 91 BPM
STRESS TARGET HR: 143 BPM
STRESS TARGET HR: 143 BPM
TRIGL SERPL-MCNC: 194 MG/DL (ref 0–150)
TROPONIN T SERPL-MCNC: <0.01 NG/ML (ref 0–0.03)
TROPONIN T SERPL-MCNC: <0.01 NG/ML (ref 0–0.03)
VLDLC SERPL-MCNC: 33 MG/DL (ref 5–40)
WBC # BLD AUTO: 9.25 10*3/MM3 (ref 3.4–10.8)

## 2021-01-28 PROCEDURE — 93005 ELECTROCARDIOGRAM TRACING: CPT | Performed by: INTERNAL MEDICINE

## 2021-01-28 PROCEDURE — 63710000001 INSULIN ASPART PER 5 UNITS: Performed by: INTERNAL MEDICINE

## 2021-01-28 PROCEDURE — 93971 EXTREMITY STUDY: CPT | Performed by: RADIOLOGY

## 2021-01-28 PROCEDURE — 93306 TTE W/DOPPLER COMPLETE: CPT

## 2021-01-28 PROCEDURE — 25010000002 HEPARIN (PORCINE) PER 1000 UNITS: Performed by: INTERNAL MEDICINE

## 2021-01-28 PROCEDURE — 99222 1ST HOSP IP/OBS MODERATE 55: CPT | Performed by: INTERNAL MEDICINE

## 2021-01-28 PROCEDURE — 85025 COMPLETE CBC W/AUTO DIFF WBC: CPT | Performed by: INTERNAL MEDICINE

## 2021-01-28 PROCEDURE — A9500 TC99M SESTAMIBI: HCPCS | Performed by: INTERNAL MEDICINE

## 2021-01-28 PROCEDURE — 63710000001 INSULIN DETEMIR PER 5 UNITS: Performed by: INTERNAL MEDICINE

## 2021-01-28 PROCEDURE — 93017 CV STRESS TEST TRACING ONLY: CPT

## 2021-01-28 PROCEDURE — 83036 HEMOGLOBIN GLYCOSYLATED A1C: CPT | Performed by: INTERNAL MEDICINE

## 2021-01-28 PROCEDURE — 25010000002 REGADENOSON 0.4 MG/5ML SOLUTION: Performed by: INTERNAL MEDICINE

## 2021-01-28 PROCEDURE — 84132 ASSAY OF SERUM POTASSIUM: CPT | Performed by: INTERNAL MEDICINE

## 2021-01-28 PROCEDURE — 63710000001 PROMETHAZINE PER 12.5 MG: Performed by: PHYSICIAN ASSISTANT

## 2021-01-28 PROCEDURE — 93010 ELECTROCARDIOGRAM REPORT: CPT | Performed by: INTERNAL MEDICINE

## 2021-01-28 PROCEDURE — 25010000002 AMINOPHYLLINE PER 250 MG: Performed by: INTERNAL MEDICINE

## 2021-01-28 PROCEDURE — 82962 GLUCOSE BLOOD TEST: CPT

## 2021-01-28 PROCEDURE — 25010000002 ONDANSETRON PER 1 MG: Performed by: INTERNAL MEDICINE

## 2021-01-28 PROCEDURE — 25010000003 POTASSIUM CHLORIDE 10 MEQ/100ML SOLUTION: Performed by: INTERNAL MEDICINE

## 2021-01-28 PROCEDURE — 78452 HT MUSCLE IMAGE SPECT MULT: CPT | Performed by: INTERNAL MEDICINE

## 2021-01-28 PROCEDURE — 99232 SBSQ HOSP IP/OBS MODERATE 35: CPT | Performed by: INTERNAL MEDICINE

## 2021-01-28 PROCEDURE — 93306 TTE W/DOPPLER COMPLETE: CPT | Performed by: INTERNAL MEDICINE

## 2021-01-28 PROCEDURE — 25010000002 MORPHINE PER 10 MG: Performed by: INTERNAL MEDICINE

## 2021-01-28 PROCEDURE — 84484 ASSAY OF TROPONIN QUANT: CPT | Performed by: INTERNAL MEDICINE

## 2021-01-28 PROCEDURE — 80178 ASSAY OF LITHIUM: CPT | Performed by: INTERNAL MEDICINE

## 2021-01-28 PROCEDURE — 80061 LIPID PANEL: CPT | Performed by: INTERNAL MEDICINE

## 2021-01-28 PROCEDURE — 93018 CV STRESS TEST I&R ONLY: CPT | Performed by: INTERNAL MEDICINE

## 2021-01-28 PROCEDURE — 0 TECHNETIUM SESTAMIBI: Performed by: INTERNAL MEDICINE

## 2021-01-28 PROCEDURE — 78452 HT MUSCLE IMAGE SPECT MULT: CPT

## 2021-01-28 PROCEDURE — 93971 EXTREMITY STUDY: CPT

## 2021-01-28 PROCEDURE — 80048 BASIC METABOLIC PNL TOTAL CA: CPT | Performed by: INTERNAL MEDICINE

## 2021-01-28 RX ORDER — RANOLAZINE 500 MG/1
500 TABLET, EXTENDED RELEASE ORAL EVERY 12 HOURS SCHEDULED
Status: DISCONTINUED | OUTPATIENT
Start: 2021-01-28 | End: 2021-01-29 | Stop reason: HOSPADM

## 2021-01-28 RX ORDER — POTASSIUM CHLORIDE 20 MEQ/1
40 TABLET, EXTENDED RELEASE ORAL AS NEEDED
Status: DISCONTINUED | OUTPATIENT
Start: 2021-01-28 | End: 2021-01-29

## 2021-01-28 RX ORDER — FUROSEMIDE 40 MG/1
40 TABLET ORAL
Status: DISCONTINUED | OUTPATIENT
Start: 2021-01-28 | End: 2021-01-29 | Stop reason: HOSPADM

## 2021-01-28 RX ORDER — POTASSIUM CHLORIDE 1.5 G/1.77G
40 POWDER, FOR SOLUTION ORAL AS NEEDED
Status: DISCONTINUED | OUTPATIENT
Start: 2021-01-28 | End: 2021-01-28 | Stop reason: SDUPTHER

## 2021-01-28 RX ORDER — POTASSIUM CHLORIDE 7.45 MG/ML
10 INJECTION INTRAVENOUS
Status: DISPENSED | OUTPATIENT
Start: 2021-01-28 | End: 2021-01-28

## 2021-01-28 RX ORDER — LITHIUM CARBONATE 300 MG/1
600 CAPSULE ORAL NIGHTLY
Status: DISCONTINUED | OUTPATIENT
Start: 2021-01-28 | End: 2021-01-29 | Stop reason: HOSPADM

## 2021-01-28 RX ORDER — RANOLAZINE 500 MG/1
1000 TABLET, EXTENDED RELEASE ORAL 2 TIMES DAILY
Status: CANCELLED | OUTPATIENT
Start: 2021-01-28

## 2021-01-28 RX ORDER — POTASSIUM CHLORIDE 7.45 MG/ML
10 INJECTION INTRAVENOUS
Status: DISCONTINUED | OUTPATIENT
Start: 2021-01-28 | End: 2021-01-28 | Stop reason: SDUPTHER

## 2021-01-28 RX ORDER — POTASSIUM CHLORIDE 20 MEQ/1
40 TABLET, EXTENDED RELEASE ORAL EVERY 4 HOURS
Status: COMPLETED | OUTPATIENT
Start: 2021-01-28 | End: 2021-01-28

## 2021-01-28 RX ORDER — POTASSIUM CHLORIDE 1.5 G/1.77G
40 POWDER, FOR SOLUTION ORAL AS NEEDED
Status: DISCONTINUED | OUTPATIENT
Start: 2021-01-28 | End: 2021-01-29

## 2021-01-28 RX ORDER — NICOTINE POLACRILEX 4 MG
15 LOZENGE BUCCAL
Status: DISCONTINUED | OUTPATIENT
Start: 2021-01-28 | End: 2021-01-29 | Stop reason: HOSPADM

## 2021-01-28 RX ORDER — DEXTROSE MONOHYDRATE 25 G/50ML
25 INJECTION, SOLUTION INTRAVENOUS
Status: DISCONTINUED | OUTPATIENT
Start: 2021-01-28 | End: 2021-01-29 | Stop reason: HOSPADM

## 2021-01-28 RX ORDER — ASPIRIN 81 MG/1
81 TABLET ORAL DAILY
Status: DISCONTINUED | OUTPATIENT
Start: 2021-01-28 | End: 2021-01-29 | Stop reason: HOSPADM

## 2021-01-28 RX ORDER — AMINOPHYLLINE DIHYDRATE 25 MG/ML
125 INJECTION, SOLUTION INTRAVENOUS
Status: COMPLETED | OUTPATIENT
Start: 2021-01-28 | End: 2021-01-28

## 2021-01-28 RX ORDER — POTASSIUM CHLORIDE 20 MEQ/1
40 TABLET, EXTENDED RELEASE ORAL AS NEEDED
Status: DISCONTINUED | OUTPATIENT
Start: 2021-01-28 | End: 2021-01-28 | Stop reason: SDUPTHER

## 2021-01-28 RX ORDER — LITHIUM CARBONATE 300 MG/1
900 CAPSULE ORAL DAILY
Status: DISCONTINUED | OUTPATIENT
Start: 2021-01-28 | End: 2021-01-29 | Stop reason: HOSPADM

## 2021-01-28 RX ORDER — PROMETHAZINE HYDROCHLORIDE 12.5 MG/1
12.5 TABLET ORAL EVERY 6 HOURS PRN
Status: DISCONTINUED | OUTPATIENT
Start: 2021-01-28 | End: 2021-01-29 | Stop reason: HOSPADM

## 2021-01-28 RX ADMIN — POTASSIUM CHLORIDE 10 MEQ: 7.46 INJECTION, SOLUTION INTRAVENOUS at 08:57

## 2021-01-28 RX ADMIN — TECHNETIUM TC 99M SESTAMIBI 1 DOSE: 1 INJECTION INTRAVENOUS at 10:30

## 2021-01-28 RX ADMIN — TECHNETIUM TC 99M SESTAMIBI 1 DOSE: 1 INJECTION INTRAVENOUS at 12:57

## 2021-01-28 RX ADMIN — POTASSIUM CHLORIDE 10 MEQ: 7.46 INJECTION, SOLUTION INTRAVENOUS at 14:27

## 2021-01-28 RX ADMIN — HEPARIN SODIUM 5000 UNITS: 5000 INJECTION INTRAVENOUS; SUBCUTANEOUS at 06:34

## 2021-01-28 RX ADMIN — INSULIN DETEMIR 15 UNITS: 100 INJECTION, SOLUTION SUBCUTANEOUS at 20:25

## 2021-01-28 RX ADMIN — LITHIUM CARBONATE 900 MG: 300 CAPSULE, GELATIN COATED ORAL at 10:19

## 2021-01-28 RX ADMIN — MORPHINE SULFATE 2 MG: 2 INJECTION, SOLUTION INTRAMUSCULAR; INTRAVENOUS at 09:16

## 2021-01-28 RX ADMIN — NITROGLYCERIN 0.4 MG: 0.4 TABLET, ORALLY DISINTEGRATING SUBLINGUAL at 03:27

## 2021-01-28 RX ADMIN — POTASSIUM CHLORIDE 10 MEQ: 7.46 INJECTION, SOLUTION INTRAVENOUS at 10:05

## 2021-01-28 RX ADMIN — INSULIN ASPART 6 UNITS: 100 INJECTION, SOLUTION INTRAVENOUS; SUBCUTANEOUS at 12:10

## 2021-01-28 RX ADMIN — REGADENOSON 0.4 MG: 0.08 INJECTION, SOLUTION INTRAVENOUS at 12:57

## 2021-01-28 RX ADMIN — ATORVASTATIN CALCIUM 40 MG: 40 TABLET, FILM COATED ORAL at 20:22

## 2021-01-28 RX ADMIN — POTASSIUM CHLORIDE 40 MEQ: 20 TABLET, EXTENDED RELEASE ORAL at 14:28

## 2021-01-28 RX ADMIN — LITHIUM CARBONATE 600 MG: 300 CAPSULE, GELATIN COATED ORAL at 20:22

## 2021-01-28 RX ADMIN — MORPHINE SULFATE 2 MG: 2 INJECTION, SOLUTION INTRAMUSCULAR; INTRAVENOUS at 22:39

## 2021-01-28 RX ADMIN — INSULIN ASPART 8 UNITS: 100 INJECTION, SOLUTION INTRAVENOUS; SUBCUTANEOUS at 18:29

## 2021-01-28 RX ADMIN — PROMETHAZINE HYDROCHLORIDE 12.5 MG: 12.5 TABLET ORAL at 22:59

## 2021-01-28 RX ADMIN — NITROGLYCERIN 0.4 MG: 0.4 TABLET, ORALLY DISINTEGRATING SUBLINGUAL at 03:17

## 2021-01-28 RX ADMIN — SODIUM CHLORIDE, PRESERVATIVE FREE 10 ML: 5 INJECTION INTRAVENOUS at 20:23

## 2021-01-28 RX ADMIN — RANOLAZINE 500 MG: 500 TABLET, FILM COATED, EXTENDED RELEASE ORAL at 10:18

## 2021-01-28 RX ADMIN — MORPHINE SULFATE 2 MG: 2 INJECTION, SOLUTION INTRAMUSCULAR; INTRAVENOUS at 14:28

## 2021-01-28 RX ADMIN — DRONEDARONE 400 MG: 400 TABLET, FILM COATED ORAL at 18:27

## 2021-01-28 RX ADMIN — POTASSIUM CHLORIDE 10 MEQ: 7.46 INJECTION, SOLUTION INTRAVENOUS at 12:09

## 2021-01-28 RX ADMIN — SERTRALINE 100 MG: 50 TABLET, FILM COATED ORAL at 10:18

## 2021-01-28 RX ADMIN — MORPHINE SULFATE 2 MG: 2 INJECTION, SOLUTION INTRAMUSCULAR; INTRAVENOUS at 18:44

## 2021-01-28 RX ADMIN — MORPHINE SULFATE 2 MG: 2 INJECTION, SOLUTION INTRAMUSCULAR; INTRAVENOUS at 01:57

## 2021-01-28 RX ADMIN — SODIUM CHLORIDE, PRESERVATIVE FREE 10 ML: 5 INJECTION INTRAVENOUS at 09:00

## 2021-01-28 RX ADMIN — FUROSEMIDE 40 MG: 40 TABLET ORAL at 18:27

## 2021-01-28 RX ADMIN — SODIUM CHLORIDE 75 ML/HR: 9 INJECTION, SOLUTION INTRAVENOUS at 18:23

## 2021-01-28 RX ADMIN — ASPIRIN 81 MG: 81 TABLET, COATED ORAL at 18:37

## 2021-01-28 RX ADMIN — POTASSIUM CHLORIDE 40 MEQ: 20 TABLET, EXTENDED RELEASE ORAL at 10:14

## 2021-01-28 RX ADMIN — ONDANSETRON 4 MG: 2 INJECTION INTRAMUSCULAR; INTRAVENOUS at 15:13

## 2021-01-28 RX ADMIN — RANOLAZINE 500 MG: 500 TABLET, FILM COATED, EXTENDED RELEASE ORAL at 20:22

## 2021-01-28 RX ADMIN — AMINOPHYLLINE 125 MG: 25 INJECTION, SOLUTION INTRAVENOUS at 13:09

## 2021-01-28 RX ADMIN — POTASSIUM CHLORIDE 10 MEQ: 7.46 INJECTION, SOLUTION INTRAVENOUS at 06:34

## 2021-01-28 RX ADMIN — POTASSIUM CHLORIDE 40 MEQ: 20 TABLET, EXTENDED RELEASE ORAL at 18:28

## 2021-01-29 VITALS
BODY MASS INDEX: 36.79 KG/M2 | HEIGHT: 72 IN | SYSTOLIC BLOOD PRESSURE: 92 MMHG | WEIGHT: 271.6 LBS | OXYGEN SATURATION: 96 % | DIASTOLIC BLOOD PRESSURE: 64 MMHG | TEMPERATURE: 98.5 F | HEART RATE: 70 BPM | RESPIRATION RATE: 18 BRPM

## 2021-01-29 PROBLEM — I20.89 STABLE ANGINA PECTORIS: Status: ACTIVE | Noted: 2021-01-27

## 2021-01-29 PROBLEM — I20.8 STABLE ANGINA PECTORIS (HCC): Status: ACTIVE | Noted: 2021-01-27

## 2021-01-29 LAB
GLUCOSE BLDC GLUCOMTR-MCNC: 235 MG/DL (ref 70–130)
GLUCOSE BLDC GLUCOMTR-MCNC: 262 MG/DL (ref 70–130)
GLUCOSE BLDC GLUCOMTR-MCNC: 272 MG/DL (ref 70–130)
MAGNESIUM SERPL-MCNC: 1.7 MG/DL (ref 1.6–2.6)
MAGNESIUM SERPL-MCNC: 2.1 MG/DL (ref 1.6–2.6)
POTASSIUM SERPL-SCNC: 3.3 MMOL/L (ref 3.5–5.2)
POTASSIUM SERPL-SCNC: 3.6 MMOL/L (ref 3.5–5.2)
QT INTERVAL: 454 MS
QT INTERVAL: 476 MS
QTC INTERVAL: 510 MS
QTC INTERVAL: 521 MS

## 2021-01-29 PROCEDURE — 25010000002 MAGNESIUM SULFATE 2 GM/50ML SOLUTION: Performed by: INTERNAL MEDICINE

## 2021-01-29 PROCEDURE — 25010000002 METOCLOPRAMIDE PER 10 MG: Performed by: INTERNAL MEDICINE

## 2021-01-29 PROCEDURE — 25010000003 POTASSIUM CHLORIDE 10 MEQ/100ML SOLUTION: Performed by: INTERNAL MEDICINE

## 2021-01-29 PROCEDURE — 99239 HOSP IP/OBS DSCHRG MGMT >30: CPT | Performed by: INTERNAL MEDICINE

## 2021-01-29 PROCEDURE — 25010000002 MIDAZOLAM PER 1 MG: Performed by: INTERNAL MEDICINE

## 2021-01-29 PROCEDURE — C1894 INTRO/SHEATH, NON-LASER: HCPCS | Performed by: INTERNAL MEDICINE

## 2021-01-29 PROCEDURE — 93454 CORONARY ARTERY ANGIO S&I: CPT | Performed by: INTERNAL MEDICINE

## 2021-01-29 PROCEDURE — 84132 ASSAY OF SERUM POTASSIUM: CPT | Performed by: INTERNAL MEDICINE

## 2021-01-29 PROCEDURE — 83735 ASSAY OF MAGNESIUM: CPT | Performed by: INTERNAL MEDICINE

## 2021-01-29 PROCEDURE — 0 IOPAMIDOL PER 1 ML: Performed by: INTERNAL MEDICINE

## 2021-01-29 PROCEDURE — B2111ZZ FLUOROSCOPY OF MULTIPLE CORONARY ARTERIES USING LOW OSMOLAR CONTRAST: ICD-10-PCS | Performed by: INTERNAL MEDICINE

## 2021-01-29 PROCEDURE — 25010000002 MORPHINE PER 10 MG: Performed by: INTERNAL MEDICINE

## 2021-01-29 PROCEDURE — 4A023N8 MEASUREMENT OF CARDIAC SAMPLING AND PRESSURE, BILATERAL, PERCUTANEOUS APPROACH: ICD-10-PCS | Performed by: INTERNAL MEDICINE

## 2021-01-29 PROCEDURE — C1769 GUIDE WIRE: HCPCS | Performed by: INTERNAL MEDICINE

## 2021-01-29 PROCEDURE — 63710000001 INSULIN ASPART PER 5 UNITS: Performed by: INTERNAL MEDICINE

## 2021-01-29 PROCEDURE — 25010000002 ONDANSETRON PER 1 MG: Performed by: INTERNAL MEDICINE

## 2021-01-29 PROCEDURE — 25010000002 FENTANYL CITRATE (PF) 100 MCG/2ML SOLUTION: Performed by: INTERNAL MEDICINE

## 2021-01-29 PROCEDURE — 99152 MOD SED SAME PHYS/QHP 5/>YRS: CPT | Performed by: INTERNAL MEDICINE

## 2021-01-29 PROCEDURE — 63710000001 PROMETHAZINE PER 12.5 MG: Performed by: PHYSICIAN ASSISTANT

## 2021-01-29 PROCEDURE — 82962 GLUCOSE BLOOD TEST: CPT

## 2021-01-29 PROCEDURE — 93005 ELECTROCARDIOGRAM TRACING: CPT | Performed by: INTERNAL MEDICINE

## 2021-01-29 RX ORDER — MAGNESIUM SULFATE HEPTAHYDRATE 40 MG/ML
2 INJECTION, SOLUTION INTRAVENOUS ONCE
Status: COMPLETED | OUTPATIENT
Start: 2021-01-29 | End: 2021-01-29

## 2021-01-29 RX ORDER — KETOROLAC TROMETHAMINE 30 MG/ML
30 INJECTION, SOLUTION INTRAMUSCULAR; INTRAVENOUS ONCE
Status: DISCONTINUED | OUTPATIENT
Start: 2021-01-29 | End: 2021-01-29

## 2021-01-29 RX ORDER — POTASSIUM CHLORIDE 7.45 MG/ML
10 INJECTION INTRAVENOUS
Status: DISCONTINUED | OUTPATIENT
Start: 2021-01-29 | End: 2021-01-29 | Stop reason: HOSPADM

## 2021-01-29 RX ORDER — SODIUM CHLORIDE 9 MG/ML
100 INJECTION, SOLUTION INTRAVENOUS CONTINUOUS
Status: DISCONTINUED | OUTPATIENT
Start: 2021-01-29 | End: 2021-01-29 | Stop reason: HOSPADM

## 2021-01-29 RX ORDER — ACETAMINOPHEN 325 MG/1
650 TABLET ORAL EVERY 4 HOURS PRN
Status: DISCONTINUED | OUTPATIENT
Start: 2021-01-29 | End: 2021-01-29 | Stop reason: HOSPADM

## 2021-01-29 RX ORDER — LIDOCAINE HYDROCHLORIDE 20 MG/ML
INJECTION, SOLUTION INFILTRATION; PERINEURAL AS NEEDED
Status: DISCONTINUED | OUTPATIENT
Start: 2021-01-29 | End: 2021-01-29 | Stop reason: HOSPADM

## 2021-01-29 RX ORDER — POTASSIUM CHLORIDE 1.5 G/1.77G
40 POWDER, FOR SOLUTION ORAL AS NEEDED
Status: DISCONTINUED | OUTPATIENT
Start: 2021-01-29 | End: 2021-01-29 | Stop reason: HOSPADM

## 2021-01-29 RX ORDER — BUTALBITAL, ACETAMINOPHEN AND CAFFEINE 50; 325; 40 MG/1; MG/1; MG/1
1 TABLET ORAL ONCE
Status: COMPLETED | OUTPATIENT
Start: 2021-01-29 | End: 2021-01-29

## 2021-01-29 RX ORDER — SUMATRIPTAN 50 MG/1
50 TABLET, FILM COATED ORAL ONCE
Status: DISCONTINUED | OUTPATIENT
Start: 2021-01-29 | End: 2021-01-29

## 2021-01-29 RX ORDER — POTASSIUM CHLORIDE 7.45 MG/ML
10 INJECTION INTRAVENOUS
Status: COMPLETED | OUTPATIENT
Start: 2021-01-29 | End: 2021-01-29

## 2021-01-29 RX ORDER — POTASSIUM CHLORIDE 750 MG/1
40 CAPSULE, EXTENDED RELEASE ORAL AS NEEDED
Status: DISCONTINUED | OUTPATIENT
Start: 2021-01-29 | End: 2021-01-29 | Stop reason: HOSPADM

## 2021-01-29 RX ORDER — POTASSIUM CHLORIDE 750 MG/1
20 TABLET, FILM COATED, EXTENDED RELEASE ORAL EVERY 24 HOURS
Qty: 28 TABLET | Refills: 0 | Status: SHIPPED | OUTPATIENT
Start: 2021-01-29 | End: 2021-02-12

## 2021-01-29 RX ORDER — FENTANYL CITRATE 50 UG/ML
INJECTION, SOLUTION INTRAMUSCULAR; INTRAVENOUS AS NEEDED
Status: DISCONTINUED | OUTPATIENT
Start: 2021-01-29 | End: 2021-01-29 | Stop reason: HOSPADM

## 2021-01-29 RX ORDER — METOCLOPRAMIDE HYDROCHLORIDE 5 MG/ML
10 INJECTION INTRAMUSCULAR; INTRAVENOUS ONCE
Status: COMPLETED | OUTPATIENT
Start: 2021-01-29 | End: 2021-01-29

## 2021-01-29 RX ORDER — SUMATRIPTAN 50 MG/1
50 TABLET, FILM COATED ORAL ONCE
Status: COMPLETED | OUTPATIENT
Start: 2021-01-29 | End: 2021-01-29

## 2021-01-29 RX ORDER — METOCLOPRAMIDE HYDROCHLORIDE 5 MG/ML
10 INJECTION INTRAMUSCULAR; INTRAVENOUS ONCE
Status: DISCONTINUED | OUTPATIENT
Start: 2021-01-29 | End: 2021-01-29

## 2021-01-29 RX ORDER — ATORVASTATIN CALCIUM 40 MG/1
40 TABLET, FILM COATED ORAL NIGHTLY
Qty: 30 TABLET | Refills: 0 | Status: SHIPPED | OUTPATIENT
Start: 2021-01-29 | End: 2021-02-28

## 2021-01-29 RX ORDER — MIDAZOLAM HYDROCHLORIDE 1 MG/ML
INJECTION INTRAMUSCULAR; INTRAVENOUS AS NEEDED
Status: DISCONTINUED | OUTPATIENT
Start: 2021-01-29 | End: 2021-01-29 | Stop reason: HOSPADM

## 2021-01-29 RX ADMIN — POTASSIUM CHLORIDE 10 MEQ: 7.46 INJECTION, SOLUTION INTRAVENOUS at 08:56

## 2021-01-29 RX ADMIN — POTASSIUM CHLORIDE 10 MEQ: 7.46 INJECTION, SOLUTION INTRAVENOUS at 05:57

## 2021-01-29 RX ADMIN — MAGNESIUM SULFATE 2 G: 2 INJECTION INTRAVENOUS at 03:30

## 2021-01-29 RX ADMIN — BUTALBITAL, ACETAMINOPHEN, AND CAFFEINE 1 TABLET: 50; 325; 40 TABLET ORAL at 17:31

## 2021-01-29 RX ADMIN — INSULIN ASPART 4 UNITS: 100 INJECTION, SOLUTION INTRAVENOUS; SUBCUTANEOUS at 11:55

## 2021-01-29 RX ADMIN — INSULIN ASPART 6 UNITS: 100 INJECTION, SOLUTION INTRAVENOUS; SUBCUTANEOUS at 08:40

## 2021-01-29 RX ADMIN — SODIUM CHLORIDE 100 ML/HR: 9 INJECTION, SOLUTION INTRAVENOUS at 16:01

## 2021-01-29 RX ADMIN — SODIUM CHLORIDE, PRESERVATIVE FREE 10 ML: 5 INJECTION INTRAVENOUS at 08:57

## 2021-01-29 RX ADMIN — MORPHINE SULFATE 2 MG: 2 INJECTION, SOLUTION INTRAMUSCULAR; INTRAVENOUS at 08:36

## 2021-01-29 RX ADMIN — POTASSIUM CHLORIDE 10 MEQ: 7.46 INJECTION, SOLUTION INTRAVENOUS at 04:41

## 2021-01-29 RX ADMIN — DRONEDARONE 400 MG: 400 TABLET, FILM COATED ORAL at 08:40

## 2021-01-29 RX ADMIN — SUMATRIPTAN SUCCINATE 50 MG: 50 TABLET ORAL at 19:11

## 2021-01-29 RX ADMIN — POTASSIUM CHLORIDE 10 MEQ: 7.46 INJECTION, SOLUTION INTRAVENOUS at 06:49

## 2021-01-29 RX ADMIN — ONDANSETRON 4 MG: 2 INJECTION INTRAMUSCULAR; INTRAVENOUS at 18:12

## 2021-01-29 RX ADMIN — SODIUM CHLORIDE, PRESERVATIVE FREE 10 ML: 5 INJECTION INTRAVENOUS at 19:14

## 2021-01-29 RX ADMIN — METOCLOPRAMIDE 10 MG: 5 INJECTION, SOLUTION INTRAMUSCULAR; INTRAVENOUS at 19:11

## 2021-01-29 RX ADMIN — ACETAMINOPHEN 650 MG: 325 TABLET ORAL at 14:52

## 2021-01-29 RX ADMIN — RANOLAZINE 500 MG: 500 TABLET, FILM COATED, EXTENDED RELEASE ORAL at 08:40

## 2021-01-29 RX ADMIN — PROMETHAZINE HYDROCHLORIDE 12.5 MG: 12.5 TABLET ORAL at 09:03

## 2021-01-29 RX ADMIN — SODIUM CHLORIDE 100 ML/HR: 9 INJECTION, SOLUTION INTRAVENOUS at 11:00

## 2021-01-29 RX ADMIN — MORPHINE SULFATE 2 MG: 2 INJECTION, SOLUTION INTRAMUSCULAR; INTRAVENOUS at 02:12

## 2021-01-29 RX ADMIN — INSULIN ASPART 6 UNITS: 100 INJECTION, SOLUTION INTRAVENOUS; SUBCUTANEOUS at 18:13

## 2021-01-30 ENCOUNTER — READMISSION MANAGEMENT (OUTPATIENT)
Dept: CALL CENTER | Facility: HOSPITAL | Age: 51
End: 2021-01-30

## 2021-01-30 NOTE — OUTREACH NOTE
Prep Survey      Responses   [facility] facility patient discharged from?  [facility]   Is LACE score < 7 ?  No   Emergency Room discharge w/ pulse ox?  No   Eligibility  Readm Mgmt   Discharge diagnosis  Chest pain with typical features    Does the patient have one of the following disease processes/diagnoses(primary or secondary)?  Acute MI (STEMI,NSTEMI)   Does the patient have Home health ordered?  No   Is there a DME ordered?  No   General alerts for this patient  Heart Cath    Prep survey completed?  Yes          Juanita Nelson RN

## 2021-02-01 ENCOUNTER — READMISSION MANAGEMENT (OUTPATIENT)
Dept: CALL CENTER | Facility: HOSPITAL | Age: 51
End: 2021-02-01

## 2021-02-01 NOTE — OUTREACH NOTE
AMI Week 1 Survey      Responses   [facility] patient discharged from?  [facility]   Does the patient have one of the following disease processes/diagnoses(primary or secondary)?  Acute MI (STEMI,NSTEMI)   Week 1 attempt successful?  Yes   Call start time  1414   Call end time  1415   Is patient permission given to speak with other caregiver?  Yes   List who call center can speak with  ELLY HERRON   Person spoke with today (if not patient) and relationship  ELLY HERRON- WIFE   Meds reviewed with patient/caregiver?  Yes   Is the patient having any side effects they believe may be caused by any medication additions or changes?  No   Does the patient have all prescriptions related to this admission filled (includes statins,anticoagulants,HTN meds,anti-arrhythmia meds)  Yes   Is the patient taking all medications as directed (includes completed medication regime)?  Yes   Does the patient have a primary care provider?   Yes   Comments regarding PCP  WIFE KEPT CALL BRIEF, BUT STATES PATIENT HAS ALL FOLLOW UP APPOINTMENTS MADE.    Did the patient receive a copy of their discharge instructions?  Yes   What is the patient's perception of their health status since discharge?  Improving   Wrap up additional comments  WIFE KEPT CALL VERY BRIEF, STATING PATIENT IS DOING WELL AND HAS NO NEEDS.           Yissel Lau, ELINN

## 2021-03-18 ENCOUNTER — BULK ORDERING (OUTPATIENT)
Dept: CASE MANAGEMENT | Facility: OTHER | Age: 51
End: 2021-03-18

## 2021-03-18 DIAGNOSIS — Z23 IMMUNIZATION DUE: ICD-10-CM

## 2021-04-17 ENCOUNTER — HOSPITAL ENCOUNTER (EMERGENCY)
Dept: HOSPITAL 79 - ER1 | Age: 51
Discharge: LEFT BEFORE DISCHARGE | End: 2021-04-17
Payer: COMMERCIAL

## 2021-04-17 DIAGNOSIS — Z79.01: ICD-10-CM

## 2021-04-17 DIAGNOSIS — I11.9: ICD-10-CM

## 2021-04-17 DIAGNOSIS — E11.9: ICD-10-CM

## 2021-04-17 DIAGNOSIS — I25.10: ICD-10-CM

## 2021-04-17 DIAGNOSIS — Z79.899: ICD-10-CM

## 2021-04-17 DIAGNOSIS — R07.89: Primary | ICD-10-CM

## 2021-04-17 LAB
BUN/CREATININE RATIO: 14 (ref 0–10)
HGB BLD-MCNC: 13.8 GM/DL (ref 14–17.5)
RED BLOOD COUNT: 4.85 M/UL (ref 4.2–5.5)
WHITE BLOOD COUNT: 8.7 K/UL (ref 4.5–11)

## 2021-07-05 ENCOUNTER — HOSPITAL ENCOUNTER (OUTPATIENT)
Dept: HOSPITAL 79 - ER1 | Age: 51
Setting detail: OBSERVATION
Discharge: LEFT BEFORE DISCHARGE | End: 2021-07-05
Attending: INTERNAL MEDICINE | Admitting: INTERNAL MEDICINE
Payer: COMMERCIAL

## 2021-07-05 VITALS — HEIGHT: 72 IN | WEIGHT: 280 LBS | BODY MASS INDEX: 37.93 KG/M2

## 2021-07-05 DIAGNOSIS — G47.33: ICD-10-CM

## 2021-07-05 DIAGNOSIS — F31.9: ICD-10-CM

## 2021-07-05 DIAGNOSIS — I25.119: Primary | ICD-10-CM

## 2021-07-05 DIAGNOSIS — Z86.718: ICD-10-CM

## 2021-07-05 DIAGNOSIS — Z79.899: ICD-10-CM

## 2021-07-05 DIAGNOSIS — Z95.818: ICD-10-CM

## 2021-07-05 DIAGNOSIS — Z79.82: ICD-10-CM

## 2021-07-05 DIAGNOSIS — I10: ICD-10-CM

## 2021-07-05 DIAGNOSIS — Z96.41: ICD-10-CM

## 2021-07-05 DIAGNOSIS — E87.6: ICD-10-CM

## 2021-07-05 DIAGNOSIS — Z82.49: ICD-10-CM

## 2021-07-05 DIAGNOSIS — E11.9: ICD-10-CM

## 2021-07-05 DIAGNOSIS — F41.9: ICD-10-CM

## 2021-07-05 DIAGNOSIS — E66.9: ICD-10-CM

## 2021-07-05 DIAGNOSIS — Z87.891: ICD-10-CM

## 2021-07-05 LAB
BUN/CREATININE RATIO: 17 (ref 0–10)
HGB BLD-MCNC: 12.8 GM/DL (ref 14–17.5)
RED BLOOD COUNT: 4.63 M/UL (ref 4.2–5.5)
WHITE BLOOD COUNT: 10.5 K/UL (ref 4.5–11)

## 2021-07-05 PROCEDURE — G0378 HOSPITAL OBSERVATION PER HR: HCPCS

## 2021-07-05 NOTE — NUR
Patient requested to have IV taken out and leave. Patient stated "they can not
do anything for me here." Notified Dr. Doss and cardiology PA reguarding
patients request. IV taken out at this time. AMA papers signed by patient and
placed in chart. Education performed reguarding the importance of following up
with his primary cardiologist.

## 2021-07-07 ENCOUNTER — TRANSCRIBE ORDERS (OUTPATIENT)
Dept: ADMINISTRATIVE | Facility: HOSPITAL | Age: 51
End: 2021-07-07

## 2021-07-07 DIAGNOSIS — Z01.818 OTHER SPECIFIED PRE-OPERATIVE EXAMINATION: Primary | ICD-10-CM

## 2021-07-07 RX ORDER — NITROGLYCERIN 0.4 MG/1
TABLET SUBLINGUAL
Qty: 25 TABLET | Refills: 0 | OUTPATIENT
Start: 2021-07-07

## 2021-07-08 ENCOUNTER — OFFICE VISIT (OUTPATIENT)
Dept: UROLOGY | Facility: CLINIC | Age: 51
End: 2021-07-08

## 2021-07-08 ENCOUNTER — HOSPITAL ENCOUNTER (OUTPATIENT)
Dept: GENERAL RADIOLOGY | Facility: HOSPITAL | Age: 51
Discharge: HOME | End: 2021-07-08
Admitting: UROLOGY

## 2021-07-08 ENCOUNTER — HOSPITAL ENCOUNTER (OUTPATIENT)
Dept: HOSPITAL 79 - US | Age: 51
End: 2021-07-08
Attending: INTERNAL MEDICINE
Payer: COMMERCIAL

## 2021-07-08 VITALS — BODY MASS INDEX: 36.7 KG/M2 | HEIGHT: 72 IN | WEIGHT: 271 LBS

## 2021-07-08 DIAGNOSIS — K75.81: Primary | ICD-10-CM

## 2021-07-08 DIAGNOSIS — N20.9 URIC ACID UROLITHIASIS: ICD-10-CM

## 2021-07-08 DIAGNOSIS — N20.0 KIDNEY STONE: Primary | ICD-10-CM

## 2021-07-08 DIAGNOSIS — N20.0 KIDNEY STONE: ICD-10-CM

## 2021-07-08 DIAGNOSIS — N20.1 LEFT URETERAL CALCULUS: Primary | ICD-10-CM

## 2021-07-08 PROCEDURE — 74018 RADEX ABDOMEN 1 VIEW: CPT

## 2021-07-08 PROCEDURE — 74018 RADEX ABDOMEN 1 VIEW: CPT | Performed by: RADIOLOGY

## 2021-07-08 PROCEDURE — 99213 OFFICE O/P EST LOW 20 MIN: CPT | Performed by: UROLOGY

## 2021-07-08 RX ORDER — OXYCODONE AND ACETAMINOPHEN 10; 325 MG/1; MG/1
1 TABLET ORAL EVERY 6 HOURS PRN
Qty: 16 TABLET | Refills: 0 | Status: SHIPPED | OUTPATIENT
Start: 2021-07-08 | End: 2021-09-16

## 2021-07-08 RX ORDER — SILDENAFIL 100 MG/1
TABLET, FILM COATED ORAL
COMMUNITY
Start: 2021-04-23 | End: 2023-01-24 | Stop reason: ALTCHOICE

## 2021-07-08 RX ORDER — NITROGLYCERIN 0.4 MG/1
TABLET SUBLINGUAL
COMMUNITY
Start: 2021-04-30 | End: 2021-12-24

## 2021-07-08 RX ORDER — PROMETHAZINE HYDROCHLORIDE 25 MG/1
25 TABLET ORAL EVERY 6 HOURS PRN
Qty: 21 TABLET | Refills: 2 | Status: SHIPPED | OUTPATIENT
Start: 2021-07-08 | End: 2021-09-16

## 2021-07-08 RX ORDER — FERROUS SULFATE 325(65) MG
TABLET ORAL
COMMUNITY
Start: 2021-04-23 | End: 2021-09-16

## 2021-07-08 NOTE — PROGRESS NOTES
Chief Complaint:          Chief Complaint   Patient presents with   • Flank Pain       HPI:   50 y.o. male turns today with flank pain KUB was negative.  He has had 14 previous stones there was uric acid he is always able to pass he is can to give me a call back in this regard if it continues he will need a CT scan      Past Medical History:        Past Medical History:   Diagnosis Date   • Abnormal ECG    • Alcohol liver damage (CMS/HCC)    • Anxiety    • Arrhythmia    • Atrial fibrillation (CMS/HCC)    • Benign hypertension    • Bipolar depression (CMS/HCC)    • CHF (congestive heart failure) (CMS/HCC)    • Cirrhosis of liver (CMS/HCC)    • Clotting disorder (CMS/HCC)    • Coronary artery disease    • DVT (deep venous thrombosis) (CMS/HCC)     Questionable history of deep venous thrombosis.   • Dyslipidemia    • Hyperlipidemia    • Myocardial infarction (CMS/HCC)     x 2   • Obesity    • JIM on CPAP     Obstructive sleep apnea, compliant with CPAP.    • Type 2 diabetes mellitus (CMS/HCC)    • Valvular disease          Current Meds:     Current Outpatient Medications   Medication Sig Dispense Refill   • apixaban (ELIQUIS) 5 MG tablet tablet Take 5 mg by mouth 2 (Two) Times a Day.     • aspirin 325 MG tablet Take 325 mg by mouth Daily.     • dronedarone (Multaq) 400 MG tablet Take 1 tablet by mouth 2 (Two) Times a Day With Meals. 60 tablet 6   • FeroSul 325 (65 Fe) MG tablet      • furosemide (LASIX) 40 MG tablet Take 40 mg by mouth 2 (Two) Times a Day.     • Insulin Glargine (BASAGLAR KWIKPEN) 100 UNIT/ML injection pen Inject 40 Units under the skin into the appropriate area as directed Daily. Per SS  3   • insulin lispro (humaLOG, ADMELOG) 100 UNIT/ML injection Inject 2-12 Units under the skin into the appropriate area as directed 3 (Three) Times a Day Before Meals.     • lithium carbonate 300 MG capsule Take 900 mg by mouth Every Morning.  2   • lithium carbonate 300 MG capsule Take 600 mg by mouth Every Night.      • nitroglycerin (NITROSTAT) 0.4 MG SL tablet      • ranolazine (RANEXA) 1000 MG 12 hr tablet Take 1,000 mg by mouth 2 (Two) Times a Day.     • sertraline (ZOLOFT) 100 MG tablet Take 200 mg by mouth Daily.  2   • sildenafil (VIAGRA) 100 MG tablet Take 1/4 TO 1 TABLET DAILY AS NEEDED 30 minutes prior TO intercourse       No current facility-administered medications for this visit.        Allergies:      Allergies   Allergen Reactions   • Beta Adrenergic Blockers Other (See Comments)     profound blood pressure drops.   • Calcium Channel Blockers Other (See Comments)     profound blood pressure drops.   • Imdur [Isosorbide Dinitrate] Other (See Comments)     Headaches  Patient states his blood pressure drops profoundly?        Past Surgical History:     Past Surgical History:   Procedure Laterality Date   • ANAL FISTULA REPAIR     • CARDIAC CATHETERIZATION     • CARDIAC CATHETERIZATION     • CARDIAC CATHETERIZATION N/A 2021    Procedure: Left Heart Cath;  Surgeon: Adam Cortez MD;  Location: [facility] CATH INVAS[facility];  Service: Cardiology;  Laterality: N/A;   • CAROTID STENT     • CHOLECYSTECTOMY     • CORONARY ANGIOPLASTY WITH STENT PLACEMENT Left 2015    Persistent cardiac symptoms with cardiac catheterization, Dr. Moss, with PTCA and stenting of the mid-LAD, 2015.   • GANGLION CYST EXCISION      left knee   • PYLOROMYOTOMY           Social History:     Social History     Socioeconomic History   • Marital status:      Spouse name: Not on file   • Number of children: Not on file   • Years of education: Not on file   • Highest education level: Not on file   Tobacco Use   • Smoking status: Former Smoker     Packs/day: 1.00     Years: 15.00     Pack years: 15.00     Types: Cigarettes     Start date: 1985     Quit date:      Years since quittin.5   • Smokeless tobacco: Never Used   • Tobacco comment: Quit 12 years ago; smoked 17 years at 1 PPD   Substance and  Sexual Activity   • Alcohol use: No     Comment: Quit 12 years ago; drank a fifth a day for 20 years   • Drug use: No   • Sexual activity: Yes     Partners: Female     Birth control/protection: None       Family History:     Family History   Problem Relation Age of Onset   • Heart disease Father         CAD, 25 year old   • Nephrolithiasis Father    • Heart attack Father    • Atrial fibrillation Mother    • Arrhythmia Mother    • No Known Problems Sister        Review of Systems:     Review of Systems   Constitutional: Negative.    HENT: Negative.    Eyes: Negative.    Respiratory: Negative.    Cardiovascular: Negative.    Gastrointestinal: Negative.    Endocrine: Negative.    Genitourinary: Positive for flank pain.   Allergic/Immunologic: Negative.    Neurological: Negative.    Hematological: Negative.    Psychiatric/Behavioral: Negative.        Physical Exam:     Physical Exam  Vitals and nursing note reviewed.   Constitutional:       Appearance: He is well-developed.   HENT:      Head: Normocephalic and atraumatic.   Eyes:      Conjunctiva/sclera: Conjunctivae normal.      Pupils: Pupils are equal, round, and reactive to light.   Cardiovascular:      Rate and Rhythm: Normal rate and regular rhythm.      Heart sounds: Normal heart sounds.   Pulmonary:      Effort: Pulmonary effort is normal.      Breath sounds: Normal breath sounds.   Abdominal:      General: Bowel sounds are normal.      Palpations: Abdomen is soft.   Musculoskeletal:         General: Normal range of motion.      Cervical back: Normal range of motion.   Skin:     General: Skin is warm and dry.   Neurological:      Mental Status: He is alert and oriented to person, place, and time.      Deep Tendon Reflexes: Reflexes are normal and symmetric.   Psychiatric:         Behavior: Behavior normal.         Thought Content: Thought content normal.         Judgment: Judgment normal.         I have reviewed the following portions of the patient's history:  allergies, current medications, past family history, past medical history, past social history, past surgical history, problem list and ROS and confirm it's accurate.      Procedure:       Assessment/Plan:   Uric acid urolithiasis-KUB negative but it would be given the radiolucent nature of these types of stones I have recommended a CT if the pain continues for definitive localization of the stones                This document has been electronically signed by LUIS ANTONIO DUVAL MD July 8, 2021 10:55 EDT

## 2021-07-15 NOTE — TELEPHONE ENCOUNTER
"Subjective   Nery Vegas is a 16 y.o. female who presents today for initial evaluation     Chief Complaint:  Anxiety and depression    History of Present Illness:   Here for a follow up visit, accompanied by Dolores, mother.  He is taking her medication as prescribed, denies side effects.  States things have improved, sleeping has much improved. States her ADHD symptoms rated 7/10, anxiety rated 5/10, depression 4/10 with 10 being the worst.  Sleeping is improved, denies NM. Appetite is good, continues to eat when \"stressed\". Denies SI/HI/AVH.  Denies thoughts of self-harm.            The following portions of the patient's history were reviewed and updated as appropriate: allergies, current medications, past family history, past medical history, past social history, past surgical history and problem list.      Past Medical History:  Past Medical History:   Diagnosis Date   • Seasonal allergies        Social History:  Social History     Socioeconomic History   • Marital status: Single     Spouse name: Not on file   • Number of children: Not on file   • Years of education: Not on file   • Highest education level: Not on file   Tobacco Use   • Smoking status: Never Smoker   • Smokeless tobacco: Never Used   Vaping Use   • Vaping Use: Never used   Substance and Sexual Activity   • Alcohol use: No   • Drug use: No   • Sexual activity: Never       Family History:  Family History   Problem Relation Age of Onset   • No Known Problems Mother    • No Known Problems Father    • No Known Problems Brother    • No Known Problems Maternal Grandmother    • No Known Problems Maternal Grandfather    • No Known Problems Paternal Grandmother    • No Known Problems Paternal Grandfather        Past Surgical History:  Past Surgical History:   Procedure Laterality Date   • TONSILLECTOMY AND ADENOIDECTOMY         Problem List:  There is no problem list on file for this patient.      Allergy:   Allergies   Allergen Reactions   • Flonase " Received pt's [employer] message stating that her had CP this morning w/ pain scale 8/10.     Per chart review,  Nitro is on pt's rx list. Last saw Dr. Ramirez on 2/25/19, scheduled w/ Dr. Ramirez on 8/26/19.           Called pt:  Explained to pt that if he has CP in the future he needs to take nitro and go to ER if it has no effect. Stated not to send [employer] messages regd something as severe as CP, because we don't always see them right away. Pt verbalized understanding.  Pt states the CP started at 2am, describes as a progressive squeezing/cramping pain. Three fingers on left hand are numb. Left arm is weak. C/o SoA. I stopped pt and informed him that he needs to go to the ER. Pt stated he doesn't want to go to ER and would like to be seen next week. I informed pt that w/ the sx he's describing he needs to proceed to the ER. Pt stated he really didn't want to. I asked if he had taken nitro, he stated he's taken 5. I informed pt that he's only supposed to take 3 nitro and that if that doesn't work he's supposed to go to the ER.   Sarah took over call, as pt knows her well. She re-iterated what I had explained to pt. He agreed to proceed to ER.   (sent FYI to Jing, so she's aware)    "[Fluticasone]      Migraine headache    • Keflex [Cephalexin] Rash     ? PCN        Current Medications:   Current Outpatient Medications   Medication Sig Dispense Refill   • levonorgestrel (KYLEENA) 19.5 MG intrauterine device IUD 1 each by Intrauterine route 1 (One) Time.     • prazosin (MINIPRESS) 1 MG capsule Take 1 capsule by mouth Every Night. 30 capsule 0   • venlafaxine (EFFEXOR) 37.5 MG tablet Take 1 tablet by mouth Daily. 30 tablet 0   • atomoxetine (Strattera) 10 MG capsule Take 1 capsule by mouth Daily. 30 capsule 0   • drospirenone-ethinyl estradiol (CHALO,GIANVI) 3-0.02 MG per tablet Take  by mouth Daily.  3     No current facility-administered medications for this visit.       Review of Symptoms:    Review of Systems   Constitutional: Negative.    HENT: Negative.    Eyes: Negative.    Respiratory: Negative.    Cardiovascular: Negative.    Musculoskeletal: Negative.    Neurological: Negative.    Psychiatric/Behavioral: Positive for decreased concentration, sleep disturbance and depressed mood. The patient is nervous/anxious.        Objective   Physical Exam:   Blood pressure 112/69, pulse 69, height 172.7 cm (67.99\"), weight 88.9 kg (196 lb).  Body mass index is 29.81 kg/m².    Appearance:  female appears stated age, no acute distress noted.    Gait, Station, Strength: Steady, posture erect, WNL      Mental Status Exam:   Hygiene:   good  Cooperation:  Cooperative  Eye Contact:  Good  Psychomotor Behavior:  Appropriate  Affect:  Appropriate  Mood: normal  Hopelessness: Denies  Speech:  Normal  Thought Process:  Goal directed and Linear  Thought Content:  Normal  Suicidal:  None  Homicidal:  None  Hallucinations:  None  Delusion:  None  Memory:  Intact  Orientation:  Person, Place, Time and Situation  Reliability:  good  Insight:  Good  Judgement:  Good  Impulse Control:  Good  Physical/Medical Issues:  No            Lab Results:   No visits with results within 1 Month(s) from this visit. "   Latest known visit with results is:   Office Visit on 07/09/2019   Component Date Value Ref Range Status   • External Amphetamine Screen Urine 07/09/2019 Negative   Final   • Amphetamine Cut-Off 07/09/2019 1000mg/ml   Final   • External Benzodiazepine Screen Uri* 07/09/2019 Negative   Final   • Benzodiazipine Cut-Off 07/09/2019 300mg/ml   Final   • External Cocaine Screen Urine 07/09/2019 Negative   Final   • Cocaine Cut-Off 07/09/2019 300mg/ml   Final   • External THC Screen Urine 07/09/2019 Negative   Final   • THC Cut-Off 07/09/2019 50mg/ml   Final   • External Methadone Screen Urine 07/09/2019 Negative   Final   • Methadone Cut-Off 07/09/2019 300mg/ml   Final   • External Methamphetamine Screen Ur* 07/09/2019 Negative   Final   • Methamphetamine Cut-Off 07/09/2019 1000mg/ml   Final   • External Oxycodone Screen Urine 07/09/2019 Negative   Final   • Oxycodone Cut-Off 07/09/2019 100mg/ml   Final   • External Buprenorphine Screen Urine 07/09/2019 Negative   Final   • Buprenorphine Cut-Off 07/09/2019 10mg/ml   Final   • External MDMA 07/09/2019 Negative   Final   • MDMA Cut-Off 07/09/2019 500mg/ml   Final   • External Opiates Screen Urine 07/09/2019 Negative   Final   • Opiates Cut-Off 07/09/2019 300mg/ml   Final       Assessment/Plan   Problems Addressed this Visit     None      Visit Diagnoses     Generalized anxiety disorder    -  Primary    Relevant Medications    atomoxetine (Strattera) 10 MG capsule    prazosin (MINIPRESS) 1 MG capsule    venlafaxine (EFFEXOR) 37.5 MG tablet    Social anxiety disorder        Relevant Medications    atomoxetine (Strattera) 10 MG capsule    prazosin (MINIPRESS) 1 MG capsule    venlafaxine (EFFEXOR) 37.5 MG tablet    Current moderate episode of major depressive disorder without prior episode (CMS/HCC)        Relevant Medications    atomoxetine (Strattera) 10 MG capsule    prazosin (MINIPRESS) 1 MG capsule    venlafaxine (EFFEXOR) 37.5 MG tablet    Medication management         ADHD, predominantly inattentive type        Relevant Medications    atomoxetine (Strattera) 10 MG capsule    venlafaxine (EFFEXOR) 37.5 MG tablet      Diagnoses       Codes Comments    Generalized anxiety disorder    -  Primary ICD-10-CM: F41.1  ICD-9-CM: 300.02     Social anxiety disorder     ICD-10-CM: F40.10  ICD-9-CM: 300.23     Current moderate episode of major depressive disorder without prior episode (CMS/[facility])     ICD-10-CM: F32.1  ICD-9-CM: 296.22     Medication management     ICD-10-CM: Z79.899  ICD-9-CM: V58.69     ADHD, predominantly inattentive type     ICD-10-CM: F90.0  ICD-9-CM: 314.00           Social History     Tobacco Use   Smoking Status Never Smoker   Smokeless Tobacco Never Used     ANGELIKA reviewed and appropriate. Patient counseled on use of controlled substances.     -The benefits of a healthy diet and exercise were discussed with patient, especially the positive effects they have on mental health. Patient encouraged to consider lifestyle modification regarding  diet and exercise patterns to maximize results of mental health treatment.  -Reviewed previous available documentation  -Reviewed most recent available labs   -Medication options for treatment of ADHD discussed including Alpha 2 agonists, Strattera, Wellbutrin, Methylphenidate and Amphetamine options. Extensive education is provided regarding stimulants. Cardiac risk, risk of growth delay, weight loss, and cardiac issues. Patient is being prescribed a controlled substance as part of treatment plan. Patient and guardian have been educated of appropriate use of the medications and potential side effects of: weight loss, anorexia, dry mouth, abdominal pain,  insomnia, dizziness and potential for dependence. Controlled substance agreement obtained.Patient is also informed that the medication are to be used by the patient only- avoid any combined use of ETOH or other substances unless prescribed.      Visit Diagnoses:    ICD-10-CM ICD-9-CM    1. Generalized anxiety disorder  F41.1 300.02   2. Social anxiety disorder  F40.10 300.23   3. Current moderate episode of major depressive disorder without prior episode (CMS/HCC)  F32.1 296.22   4. Medication management  Z79.899 V58.69   5. ADHD, predominantly inattentive type  F90.0 314.00         TREATMENT PLAN/GOALS: Continue supportive psychotherapy efforts and medications as indicated. Treatment and medication options discussed during today's visit. Patient acknowledged and verbally consented to continue with current treatment plan and was educated on the importance of compliance with treatment and follow-up appointments.    MEDICATION ISSUES:  Discussed medication options and treatment plan of prescribed medication as well as the risks, benefits, and side effects including potential falls, possible impaired driving and metabolic adversities among others. Patient is agreeable to call the office with any worsening of symptoms or onset of side effects. Patient is agreeable to call 911 or go to the nearest ER should he/she begin having SI/HI.     MEDS ORDERED DURING VISIT:  New Medications Ordered This Visit   Medications   • atomoxetine (Strattera) 10 MG capsule     Sig: Take 1 capsule by mouth Daily.     Dispense:  30 capsule     Refill:  0   • prazosin (MINIPRESS) 1 MG capsule     Sig: Take 1 capsule by mouth Every Night.     Dispense:  30 capsule     Refill:  0   • venlafaxine (EFFEXOR) 37.5 MG tablet     Sig: Take 1 tablet by mouth Daily.     Dispense:  30 tablet     Refill:  0       Return in about 1 month (around 8/28/2021) for Recheck.  -Continue Effexor 37.5 mg tablet once daily for anxiety.  -Continue  prazosin 1 mg capsule take 1 capsule by mouth every night for nightmares and anxiety.  -Added Strattera 10 mg, take one daily for ADHD.  -D/C clonidine.  -Did not complete lab orders.         Prognosis: Guarded dependent on medication/follow up and treatment plan compliance.  Functionality: pt showing  improvements in important areas of daily functioning.     Short-term goals: Patient will adhere to medication regimen and note continued improvement in symptoms over the next 3 months.   Long-term goals: Patient will be adherent to medication management and psychotherapy with continued improvement in symptoms over the next 6 months    I spent 30 minutes caring for Nery on this date of service. This time includes time spent by me in the following activities: preparing for the visit, obtaining and/or reviewing a separately obtained history, performing a medically appropriate examination and/or evaluation, counseling and educating the patient/family/caregiver, ordering medications, tests, or procedures and documenting information in the medical record        This document has been electronically signed by KYLE Carolina   July 28, 2021 09:17 EDT    Part of this note may be an electronic transcription/translation of spoken language to printed text using the Dragon Dictation System.

## 2021-07-22 PROBLEM — N20.9 URIC ACID UROLITHIASIS: Status: ACTIVE | Noted: 2021-07-22

## 2021-09-13 ENCOUNTER — TELEPHONE (OUTPATIENT)
Dept: CARDIOLOGY | Facility: CLINIC | Age: 51
End: 2021-09-13

## 2021-09-13 NOTE — TELEPHONE ENCOUNTER
Received a call on the triage line stating the pt was in the hospital.     Called and spoke to CARINA Mancera. He stated that the pt was admitted with cp and tightness, 9 stents. Stated EKG was good, (-) Tropinin, (-) 3 hour Tropinin. Prescribed medication. Pt left AMA but did state he was going to call his cardiologist and make an appt.

## 2021-09-16 ENCOUNTER — OFFICE VISIT (OUTPATIENT)
Dept: CARDIOLOGY | Facility: CLINIC | Age: 51
End: 2021-09-16

## 2021-09-16 VITALS
DIASTOLIC BLOOD PRESSURE: 70 MMHG | WEIGHT: 292 LBS | HEART RATE: 89 BPM | HEIGHT: 72 IN | SYSTOLIC BLOOD PRESSURE: 97 MMHG | BODY MASS INDEX: 39.55 KG/M2 | OXYGEN SATURATION: 98 %

## 2021-09-16 DIAGNOSIS — R06.02 SHORTNESS OF BREATH: ICD-10-CM

## 2021-09-16 DIAGNOSIS — R00.2 PALPITATIONS: ICD-10-CM

## 2021-09-16 DIAGNOSIS — I25.119 CORONARY ARTERY DISEASE INVOLVING NATIVE CORONARY ARTERY OF NATIVE HEART WITH ANGINA PECTORIS (HCC): ICD-10-CM

## 2021-09-16 DIAGNOSIS — R07.2 PRECORDIAL PAIN: Primary | ICD-10-CM

## 2021-09-16 PROCEDURE — 99214 OFFICE O/P EST MOD 30 MIN: CPT | Performed by: PHYSICIAN ASSISTANT

## 2021-09-16 RX ORDER — CLOPIDOGREL BISULFATE 75 MG/1
75 TABLET ORAL DAILY
Qty: 30 TABLET | Refills: 11 | Status: SHIPPED | OUTPATIENT
Start: 2021-09-16 | End: 2021-11-15

## 2021-09-16 NOTE — PATIENT INSTRUCTIONS

## 2021-09-16 NOTE — PROGRESS NOTES
Problem list     Subjective   Ricardo Crespo is a 51 y.o. male     Chief Complaint   Patient presents with   • Follow-up     [facility] records in chart    PROBLEM LIST:      1. Coronary artery disease  1.1. Stenting of the LAD, 11/2015, per Dr. Moss.  1.2. Repeat catheterization 2-3 weeks after stenting as above, indicating patent stent with 40% PDA stenosis. Medical management was recommended.  1.3. Repeat catheterization, 01/2016, indicating nonobstructive disease with patent stent. Medical management was recommended.  1.4.  Stenting of the PDA, 03/2017, in the setting of low-level symptoms and abnormal stress test.  Previous stent to the LAD was patent.  The patient had nonobstructive disease otherwise.  Medical management was recommended.  1.5.  Stenting of the mid RCA and PTCA only of the distal LAD, 09/17.  1.6.  Repeat LHC, 11/17, in the setting of acute chest pain.  The patient had 50% LAD stenosis, patent stents, and non-obstructive CAD otherwise.  1. 7 repeat left heart catheter March 2018 with stenting of the LAD ×2.  Nonobstructive disease otherwise and patent stent with medical management recommended  1.8 left heart cath 10/16/18-30-40% LAD, 40% apical branch, 10% circumflex left, right coronary artery 10%, EF 60%  1.9 LHC, 2-8-19, EF 55-60%, demonstrated widely patent epicardial coronary arteries with diffuse non-obstructive disease with empiric therapy for ischemia and risk factor manag.  1.10 Stress test 2/28/20, normal EF  2. Preserved systolic function  3. Hypertension  4. Dyslipidemia  5. Diabetes mellitus  6. Sleep apnea  7. Reported history of DVT   8. Atrial Fibrillation      HPI  The patient presents into the clinic today for routine evaluation and follow-up.  He is very concerned with symptoms.  He has low level symptoms which are severe at times.  He reports chest tightness with any exertion almost at all.  He has also reproduction of symptoms with stress.  His dyspnea has became  significant.  His dyspnea and chest pain is rate limiting.  He denies current failure or dysrhythmic symptoms.  He has experienced no significant neck, arm, or jaw discomfort with his symptoms.  He does take nitro when able, and when blood pressure allows.  This does alleviate his discomfort.  He wanted to have an appointment today just to evaluate symptoms.  He was recently hospitalized and ischemia assessment was recommended.  He felt that he likely would have catheterization and wanted to return to his primary cardiac team and wants to discuss catheterization with us.  He really wants to avoid stress testing as a stress test has been inaccurate in the past.  He tells me that his catheterization is likely indicated at this time because of his symptoms exactly like he had prior to previous stenting procedures.    Current Outpatient Medications on File Prior to Visit   Medication Sig Dispense Refill   • aspirin 325 MG tablet Take 325 mg by mouth Daily.     • furosemide (LASIX) 40 MG tablet Take 40 mg by mouth 2 (Two) Times a Day.     • insulin lispro (humaLOG, ADMELOG) 100 UNIT/ML injection Inject 2-12 Units under the skin into the appropriate area as directed 3 (Three) Times a Day Before Meals.     • lithium carbonate 300 MG capsule Take 900 mg by mouth Every Morning.  2   • lithium carbonate 300 MG capsule Take 600 mg by mouth Every Night.     • nitroglycerin (NITROSTAT) 0.4 MG SL tablet      • ranolazine (RANEXA) 1000 MG 12 hr tablet Take 1,000 mg by mouth 2 (Two) Times a Day.     • sertraline (ZOLOFT) 100 MG tablet Take 200 mg by mouth Daily.  2   • sildenafil (VIAGRA) 100 MG tablet Take 1/4 TO 1 TABLET DAILY AS NEEDED 30 minutes prior TO intercourse     • [DISCONTINUED] apixaban (ELIQUIS) 5 MG tablet tablet Take 5 mg by mouth 2 (Two) Times a Day.     • [DISCONTINUED] dronedarone (Multaq) 400 MG tablet Take 1 tablet by mouth 2 (Two) Times a Day With Meals. 60 tablet 6   • [DISCONTINUED] FeroSul 325 (65 Fe) MG  tablet      • [DISCONTINUED] Insulin Glargine (BASAGLAR KWIKPEN) 100 UNIT/ML injection pen Inject 40 Units under the skin into the appropriate area as directed Daily. Per SS  3   • [DISCONTINUED] oxyCODONE-acetaminophen (Percocet)  MG per tablet Take 1 tablet by mouth Every 6 (Six) Hours As Needed for Moderate Pain . 16 tablet 0   • [DISCONTINUED] promethazine (PHENERGAN) 25 MG tablet Take 1 tablet by mouth Every 6 (Six) Hours As Needed for Nausea or Vomiting. 21 tablet 2     No current facility-administered medications on file prior to visit.       Beta adrenergic blockers, Calcium channel blockers, and Imdur [isosorbide dinitrate]    Past Medical History:   Diagnosis Date   • Abnormal ECG    • Alcohol liver damage (CMS/HCC)    • Anxiety    • Arrhythmia    • Atrial fibrillation (CMS/HCC)    • Benign hypertension    • Bipolar depression (CMS/HCC)    • CHF (congestive heart failure) (CMS/HCC)    • Cirrhosis of liver (CMS/HCC)    • Clotting disorder (CMS/HCC)    • Coronary artery disease    • DVT (deep venous thrombosis) (CMS/HCC)     Questionable history of deep venous thrombosis.   • Dyslipidemia    • Hyperlipidemia    • Myocardial infarction (CMS/HCC)     x 2   • Obesity    • JIM on CPAP     Obstructive sleep apnea, compliant with CPAP.    • Type 2 diabetes mellitus (CMS/HCC)    • Valvular disease        Social History     Socioeconomic History   • Marital status:      Spouse name: Not on file   • Number of children: Not on file   • Years of education: Not on file   • Highest education level: Not on file   Tobacco Use   • Smoking status: Former Smoker     Packs/day: 1.00     Years: 15.00     Pack years: 15.00     Types: Cigarettes     Start date: 1985     Quit date:      Years since quittin.7   • Smokeless tobacco: Never Used   • Tobacco comment: Quit 12 years ago; smoked 17 years at 1 PPD   Substance and Sexual Activity   • Alcohol use: No     Comment: Quit 12 years ago; drank a fifth a  "day for 20 years   • Drug use: No   • Sexual activity: Yes     Partners: Female     Birth control/protection: None       Family History   Problem Relation Age of Onset   • Heart disease Father         CAD, 25 year old   • Nephrolithiasis Father    • Heart attack Father    • Atrial fibrillation Mother    • Arrhythmia Mother    • No Known Problems Sister        Review of Systems   Constitutional: Positive for fatigue. Negative for chills and fever.   HENT: Negative.  Negative for congestion, rhinorrhea and sore throat.    Eyes: Positive for visual disturbance (glasses).   Respiratory: Positive for chest tightness and shortness of breath. Negative for wheezing.    Cardiovascular: Positive for chest pain and leg swelling. Negative for palpitations.   Gastrointestinal: Negative.    Endocrine: Negative.    Genitourinary: Negative.    Musculoskeletal: Positive for neck pain. Negative for arthralgias and back pain.   Skin: Negative.  Negative for rash and wound.   Allergic/Immunologic: Negative.  Negative for environmental allergies.   Neurological: Positive for dizziness, weakness (left side ) and numbness (lefut side ). Negative for headaches.   Hematological: Bruises/bleeds easily (bleeds).   Psychiatric/Behavioral: Negative.  Negative for sleep disturbance.       Objective   Vitals:    09/16/21 1544   BP: 97/70   BP Location: Left arm   Patient Position: Sitting   Pulse: 89   SpO2: 98%   Weight: 132 kg (292 lb)   Height: 182.9 cm (72\")      BP 97/70 (BP Location: Left arm, Patient Position: Sitting)   Pulse 89   Ht 182.9 cm (72\")   Wt 132 kg (292 lb)   SpO2 98%   BMI 39.60 kg/m²    Lab Results (most recent)     None        Physical Exam  Vitals and nursing note reviewed.   Constitutional:       General: He is not in acute distress.     Appearance: He is well-developed.   HENT:      Head: Normocephalic and atraumatic.   Eyes:      Conjunctiva/sclera: Conjunctivae normal.      Pupils: Pupils are equal, round, and " reactive to light.   Neck:      Vascular: No JVD.      Trachea: No tracheal deviation.   Cardiovascular:      Rate and Rhythm: Normal rate and regular rhythm.      Heart sounds: Normal heart sounds.   Pulmonary:      Effort: Pulmonary effort is normal.      Breath sounds: Normal breath sounds.   Abdominal:      General: Bowel sounds are normal. There is no distension.      Palpations: Abdomen is soft. There is no mass.      Tenderness: There is no abdominal tenderness. There is no guarding or rebound.   Musculoskeletal:         General: No tenderness or deformity. Normal range of motion.      Cervical back: Normal range of motion and neck supple.   Skin:     General: Skin is warm and dry.      Coloration: Skin is not pale.      Findings: No erythema or rash.   Neurological:      Mental Status: He is alert and oriented to person, place, and time.   Psychiatric:         Behavior: Behavior normal.         Thought Content: Thought content normal.         Judgment: Judgment normal.           Procedure   Procedures       Assessment/Plan      Diagnosis Plan   1. Precordial pain  [facility]    CBC & Differential    Basic Metabolic Panel    [facility]   2. Palpitations  [facility]    CBC & Differential    Basic Metabolic Panel    [facility]   3. Shortness of breath  [facility]    CBC & Differential    Basic Metabolic Panel    [facility]   4. Coronary artery disease involving native coronary artery of native heart with angina pectoris (CMS/HCC)  [facility]    CBC & Differential    Basic Metabolic Panel    [facility]       1.  The patient has low level symptoms compatible with angina.  He tells me that this is exactly like he had prior to stenting procedures.  He does not want a perform stress test as that has been an accurate for his coronary artery disease in the past.  He tells me to his symptoms are exact to what he has had with stenting and he  feels he needs to proceed on with catheterization.  He will be scheduled accordingly.    2.  He will need laboratories in the precath setting.    3.  It appears that the majority of his medications have changed since his last evaluation here.  His Multaq has been discontinued because of her reported cirrhotic status.  He has Eliquis for A. fib has been discontinued as well, again because of recurrent and severe anemia in the past.  We have reviewed CVA risk.  He still would want to hold on Eliquis for now.  He would be open to discussing Eliquis in the future.  I advised we would need to see a CBC and likely results of catheterization to be knowledgeable of further need for antiplatelet therapy at that time with concurrent anticoagulation therapy.    4.  The patient cannot tolerate statin therapy again with cirrhotic status by his report.  Alternately, I would recommend consideration for PCSK9 inhibitor therapy.  He will be started accordingly.    5.  He will continue aspirin for now.  We will add Plavix 75 mg at least until we know results of catheterization.  We cannot augment or adjust antianginal medications for now further as he has recurrent symptomatic hypotension.  For low level symptoms or unstable angina otherwise, he will proceed on to the emergency room.  Further pending results of all the above.                 Electronically signed by:

## 2021-09-20 ENCOUNTER — TRANSCRIBE ORDERS (OUTPATIENT)
Dept: ADMINISTRATIVE | Facility: HOSPITAL | Age: 51
End: 2021-09-20

## 2021-09-20 ENCOUNTER — HOSPITAL ENCOUNTER (OUTPATIENT)
Facility: HOSPITAL | Age: 51
Setting detail: HOSPITAL OUTPATIENT SURGERY
End: 2021-09-20
Attending: INTERNAL MEDICINE | Admitting: INTERNAL MEDICINE

## 2021-09-20 DIAGNOSIS — R07.2 CHEST PAIN, PRECORDIAL: Primary | ICD-10-CM

## 2021-09-20 DIAGNOSIS — R07.2 CHEST PAIN, PRECORDIAL: ICD-10-CM

## 2021-09-24 ENCOUNTER — HOSPITAL ENCOUNTER (OUTPATIENT)
Facility: HOSPITAL | Age: 51
Discharge: HOME | End: 2021-09-24
Attending: INTERNAL MEDICINE | Admitting: INTERNAL MEDICINE

## 2021-09-24 VITALS
HEIGHT: 72 IN | DIASTOLIC BLOOD PRESSURE: 86 MMHG | WEIGHT: 295.64 LBS | OXYGEN SATURATION: 95 % | TEMPERATURE: 97.2 F | BODY MASS INDEX: 40.04 KG/M2 | HEART RATE: 62 BPM | SYSTOLIC BLOOD PRESSURE: 115 MMHG | RESPIRATION RATE: 16 BRPM

## 2021-09-24 PROBLEM — I20.0 UNSTABLE ANGINA (HCC): Status: ACTIVE | Noted: 2021-09-24

## 2021-09-24 LAB
ANION GAP SERPL CALCULATED.3IONS-SCNC: 13 MMOL/L (ref 5–15)
BUN SERPL-MCNC: 17 MG/DL (ref 6–20)
BUN/CREAT SERPL: 14.9 (ref 7–25)
CALCIUM SPEC-SCNC: 8.6 MG/DL (ref 8.6–10.5)
CHLORIDE SERPL-SCNC: 102 MMOL/L (ref 98–107)
CO2 SERPL-SCNC: 26 MMOL/L (ref 22–29)
CREAT BLDA-MCNC: 1.1 MG/DL (ref 0.6–1.3)
CREAT SERPL-MCNC: 1.14 MG/DL (ref 0.76–1.27)
DEPRECATED RDW RBC AUTO: 39.1 FL (ref 37–54)
ERYTHROCYTE [DISTWIDTH] IN BLOOD BY AUTOMATED COUNT: 13.7 % (ref 12.3–15.4)
GFR SERPL CREATININE-BSD FRML MDRD: 67 ML/MIN/1.73
GLUCOSE BLDC GLUCOMTR-MCNC: 191 MG/DL (ref 70–130)
GLUCOSE BLDC GLUCOMTR-MCNC: 193 MG/DL (ref 70–130)
GLUCOSE SERPL-MCNC: 205 MG/DL (ref 65–99)
HCT VFR BLD AUTO: 40.1 % (ref 37.5–51)
HGB BLD-MCNC: 13.7 G/DL (ref 13–17.7)
MCH RBC QN AUTO: 27.4 PG (ref 26.6–33)
MCHC RBC AUTO-ENTMCNC: 34.2 G/DL (ref 31.5–35.7)
MCV RBC AUTO: 80.2 FL (ref 79–97)
PLATELET # BLD AUTO: 111 10*3/MM3 (ref 140–450)
PMV BLD AUTO: 10.4 FL (ref 6–12)
POTASSIUM SERPL-SCNC: 3 MMOL/L (ref 3.5–5.2)
RBC # BLD AUTO: 5 10*6/MM3 (ref 4.14–5.8)
SODIUM SERPL-SCNC: 141 MMOL/L (ref 136–145)
WBC # BLD AUTO: 8.46 10*3/MM3 (ref 3.4–10.8)

## 2021-09-24 PROCEDURE — 0 IOPAMIDOL PER 1 ML: Performed by: INTERNAL MEDICINE

## 2021-09-24 PROCEDURE — 25010000002 HEPARIN (PORCINE) PER 1000 UNITS: Performed by: INTERNAL MEDICINE

## 2021-09-24 PROCEDURE — 25010000002 MIDAZOLAM PER 1 MG: Performed by: INTERNAL MEDICINE

## 2021-09-24 PROCEDURE — 85027 COMPLETE CBC AUTOMATED: CPT | Performed by: INTERNAL MEDICINE

## 2021-09-24 PROCEDURE — 82962 GLUCOSE BLOOD TEST: CPT

## 2021-09-24 PROCEDURE — 25010000002 FENTANYL CITRATE (PF) 50 MCG/ML SOLUTION: Performed by: INTERNAL MEDICINE

## 2021-09-24 PROCEDURE — 80048 BASIC METABOLIC PNL TOTAL CA: CPT | Performed by: INTERNAL MEDICINE

## 2021-09-24 PROCEDURE — 82565 ASSAY OF CREATININE: CPT

## 2021-09-24 PROCEDURE — 93458 L HRT ARTERY/VENTRICLE ANGIO: CPT | Performed by: INTERNAL MEDICINE

## 2021-09-24 PROCEDURE — 25010000002 ONDANSETRON PER 1 MG: Performed by: INTERNAL MEDICINE

## 2021-09-24 PROCEDURE — C1894 INTRO/SHEATH, NON-LASER: HCPCS | Performed by: INTERNAL MEDICINE

## 2021-09-24 PROCEDURE — C1769 GUIDE WIRE: HCPCS | Performed by: INTERNAL MEDICINE

## 2021-09-24 RX ORDER — NALOXONE HCL 0.4 MG/ML
0.4 VIAL (ML) INJECTION
Status: DISCONTINUED | OUTPATIENT
Start: 2021-09-24 | End: 2021-09-24 | Stop reason: HOSPADM

## 2021-09-24 RX ORDER — ONDANSETRON 2 MG/ML
INJECTION INTRAMUSCULAR; INTRAVENOUS AS NEEDED
Status: DISCONTINUED | OUTPATIENT
Start: 2021-09-24 | End: 2021-09-24 | Stop reason: HOSPADM

## 2021-09-24 RX ORDER — MORPHINE SULFATE 2 MG/ML
1 INJECTION, SOLUTION INTRAMUSCULAR; INTRAVENOUS EVERY 4 HOURS PRN
Status: DISCONTINUED | OUTPATIENT
Start: 2021-09-24 | End: 2021-09-24 | Stop reason: HOSPADM

## 2021-09-24 RX ORDER — SODIUM CHLORIDE 9 MG/ML
250 INJECTION, SOLUTION INTRAVENOUS CONTINUOUS
Status: ACTIVE | OUTPATIENT
Start: 2021-09-24 | End: 2021-09-24

## 2021-09-24 RX ORDER — POTASSIUM CHLORIDE 1.5 G/1.77G
40 POWDER, FOR SOLUTION ORAL AS NEEDED
Status: DISCONTINUED | OUTPATIENT
Start: 2021-09-24 | End: 2021-09-24 | Stop reason: HOSPADM

## 2021-09-24 RX ORDER — TEMAZEPAM 7.5 MG/1
7.5 CAPSULE ORAL NIGHTLY PRN
Status: DISCONTINUED | OUTPATIENT
Start: 2021-09-24 | End: 2021-09-24 | Stop reason: HOSPADM

## 2021-09-24 RX ORDER — FENTANYL CITRATE 50 UG/ML
INJECTION, SOLUTION INTRAMUSCULAR; INTRAVENOUS AS NEEDED
Status: DISCONTINUED | OUTPATIENT
Start: 2021-09-24 | End: 2021-09-24 | Stop reason: HOSPADM

## 2021-09-24 RX ORDER — POTASSIUM CHLORIDE 7.45 MG/ML
10 INJECTION INTRAVENOUS
Status: DISCONTINUED | OUTPATIENT
Start: 2021-09-24 | End: 2021-09-24 | Stop reason: HOSPADM

## 2021-09-24 RX ORDER — LIDOCAINE HYDROCHLORIDE 10 MG/ML
INJECTION, SOLUTION EPIDURAL; INFILTRATION; INTRACAUDAL; PERINEURAL AS NEEDED
Status: DISCONTINUED | OUTPATIENT
Start: 2021-09-24 | End: 2021-09-24 | Stop reason: HOSPADM

## 2021-09-24 RX ORDER — NICOTINE POLACRILEX 4 MG
15 LOZENGE BUCCAL
Status: DISCONTINUED | OUTPATIENT
Start: 2021-09-24 | End: 2021-09-24 | Stop reason: HOSPADM

## 2021-09-24 RX ORDER — POTASSIUM CHLORIDE 750 MG/1
40 CAPSULE, EXTENDED RELEASE ORAL AS NEEDED
Status: DISCONTINUED | OUTPATIENT
Start: 2021-09-24 | End: 2021-09-24 | Stop reason: HOSPADM

## 2021-09-24 RX ORDER — ASPIRIN 325 MG
325 TABLET, DELAYED RELEASE (ENTERIC COATED) ORAL DAILY
Status: DISCONTINUED | OUTPATIENT
Start: 2021-09-24 | End: 2021-09-24 | Stop reason: HOSPADM

## 2021-09-24 RX ORDER — ALPRAZOLAM 0.25 MG/1
0.25 TABLET ORAL 3 TIMES DAILY PRN
Status: DISCONTINUED | OUTPATIENT
Start: 2021-09-24 | End: 2021-09-24 | Stop reason: HOSPADM

## 2021-09-24 RX ORDER — HYDROCODONE BITARTRATE AND ACETAMINOPHEN 5; 325 MG/1; MG/1
1 TABLET ORAL EVERY 4 HOURS PRN
Status: DISCONTINUED | OUTPATIENT
Start: 2021-09-24 | End: 2021-09-24 | Stop reason: HOSPADM

## 2021-09-24 RX ORDER — MIDAZOLAM HYDROCHLORIDE 1 MG/ML
INJECTION INTRAMUSCULAR; INTRAVENOUS AS NEEDED
Status: DISCONTINUED | OUTPATIENT
Start: 2021-09-24 | End: 2021-09-24 | Stop reason: HOSPADM

## 2021-09-24 RX ORDER — ACETAMINOPHEN 325 MG/1
650 TABLET ORAL EVERY 4 HOURS PRN
Status: DISCONTINUED | OUTPATIENT
Start: 2021-09-24 | End: 2021-09-24 | Stop reason: HOSPADM

## 2021-09-24 RX ORDER — DEXTROSE MONOHYDRATE 25 G/50ML
25 INJECTION, SOLUTION INTRAVENOUS
Status: DISCONTINUED | OUTPATIENT
Start: 2021-09-24 | End: 2021-09-24 | Stop reason: HOSPADM

## 2021-09-24 RX ADMIN — POTASSIUM CHLORIDE 40 MEQ: 1.5 POWDER, FOR SOLUTION ORAL at 09:42

## 2021-09-24 RX ADMIN — ASPIRIN 325 MG: 325 TABLET, COATED ORAL at 07:50

## 2021-11-10 ENCOUNTER — OFFICE VISIT (OUTPATIENT)
Dept: UROLOGY | Facility: CLINIC | Age: 51
End: 2021-11-10

## 2021-11-10 ENCOUNTER — HOSPITAL ENCOUNTER (OUTPATIENT)
Dept: GENERAL RADIOLOGY | Facility: HOSPITAL | Age: 51
Discharge: HOME | End: 2021-11-10
Admitting: NURSE PRACTITIONER

## 2021-11-10 VITALS — WEIGHT: 295 LBS | HEIGHT: 72 IN | BODY MASS INDEX: 39.96 KG/M2

## 2021-11-10 DIAGNOSIS — N20.1 LEFT URETERAL CALCULUS: ICD-10-CM

## 2021-11-10 DIAGNOSIS — N20.1 LEFT URETERAL CALCULUS: Primary | ICD-10-CM

## 2021-11-10 DIAGNOSIS — N20.9 URIC ACID UROLITHIASIS: ICD-10-CM

## 2021-11-10 DIAGNOSIS — R10.9 FLANK PAIN: Primary | ICD-10-CM

## 2021-11-10 DIAGNOSIS — R10.9 BILATERAL FLANK PAIN: ICD-10-CM

## 2021-11-10 LAB
BILIRUB BLD-MCNC: NEGATIVE MG/DL
CLARITY, POC: CLEAR
COLOR UR: YELLOW
EXPIRATION DATE: ABNORMAL
GLUCOSE UR STRIP-MCNC: NEGATIVE MG/DL
KETONES UR QL: NEGATIVE
LEUKOCYTE EST, POC: NEGATIVE
Lab: ABNORMAL
NITRITE UR-MCNC: NEGATIVE MG/ML
PH UR: 6 [PH] (ref 5–8)
PROT UR STRIP-MCNC: NEGATIVE MG/DL
RBC # UR STRIP: ABNORMAL /UL
SP GR UR: 1.01 (ref 1–1.03)
UROBILINOGEN UR QL: NORMAL

## 2021-11-10 PROCEDURE — 74018 RADEX ABDOMEN 1 VIEW: CPT

## 2021-11-10 PROCEDURE — 74018 RADEX ABDOMEN 1 VIEW: CPT | Performed by: RADIOLOGY

## 2021-11-10 PROCEDURE — 99214 OFFICE O/P EST MOD 30 MIN: CPT | Performed by: NURSE PRACTITIONER

## 2021-11-10 NOTE — PROGRESS NOTES
"Chief Complaint  Nephrolithiasis (follow up )    Subjective          Ricardo Crespo presents to [facility]  History of Present Illness     MR RICARDO CRESPO IS A VERY PLEASANT 51-year-old male with a significant history of recurrent kidney stones who presents to clinic today for evaluation. This is his 3-month follow-up.  Patient reports bilateral flank pain, lower abdominal pain and discomfort, that has remained very bothersome to him, worsening since last night.      He describes his discomfort as colicky in nature, initially was intermittent but recently constant keeping him awake most of the last night , With urinary symptoms of frequency, urgency, and dysuria.  Patient has uric acid stones due to gout, he reports always able to pass the stones. He has passed over 14 previous stones. He however denies any burning on urination, pelvic pressure, suprapubic discomfort.  He denies any/V/D, he denies any recent chills or fevers.  His urine dipstick is completely negative for any infection, is negative for gross hematuria.  He has 3+ microscopic hematuria.    Patient was last seen in clinic in July by Dr. Baez.  He had a negative KUB at that time.  He was recommended to get a CT scan if his pain and discomfort persisted for definitive localization of the stones, given the radiolucent nature of uric acid stones.  His repeat KUB today is also unremarkable, with no acute findings. IT IS also negative for any stones.    Offered patient IM Toradol in clinic for his pain and discomfort, patient is very adamant and declined medication at this time.  However requesting PO Narcotics which I explained to him to get his CT scan as recommended by Dr. Baez for definitive diagnosis and follow-up ASAP.    Objective   Vital Signs:   Ht 182.9 cm (72\")   Wt 134 kg (295 lb)   BMI 40.01 kg/m²     Physical Exam  Constitutional:       General: He is not in acute distress.     " Appearance: He is well-developed. He is obese.   HENT:      Head: Normocephalic and atraumatic.      Right Ear: External ear normal.      Left Ear: External ear normal.   Eyes:      General:         Right eye: No discharge.         Left eye: No discharge.      Conjunctiva/sclera: Conjunctivae normal.      Pupils: Pupils are equal, round, and reactive to light.   Neck:      Thyroid: No thyromegaly.      Trachea: No tracheal deviation.   Cardiovascular:      Rate and Rhythm: Normal rate and regular rhythm.      Heart sounds: No murmur heard.  No friction rub.   Pulmonary:      Effort: Pulmonary effort is normal. No respiratory distress.      Breath sounds: Normal breath sounds. No stridor.   Abdominal:      General: Bowel sounds are normal. There is distension.      Palpations: Abdomen is soft.      Tenderness: There is abdominal tenderness. There is no guarding.   Genitourinary:     Penis: Normal and uncircumcised. No tenderness or discharge.       Testes: Normal.      Rectum: Normal. Guaiac result negative.   Musculoskeletal:         General: Tenderness present. No deformity. Normal range of motion.      Cervical back: Normal range of motion and neck supple.   Skin:     General: Skin is warm and dry.      Capillary Refill: Capillary refill takes less than 2 seconds.      Coloration: Skin is not pale.   Neurological:      Mental Status: He is alert and oriented to person, place, and time.      Cranial Nerves: No cranial nerve deficit.      Coordination: Coordination normal.   Psychiatric:         Behavior: Behavior normal.         Thought Content: Thought content normal.         Judgment: Judgment normal.        Result Review :{Labs  Result Review  Imaging  Med Tab  Media  Procedures :23}     CMP    CMP 1/28/21 1/28/21 1/29/21 1/29/21 9/24/21 9/24/21    0315 0315 0115 1402 0712 0720   Glucose 335 (A)    205 (A)    BUN 14    17    Creatinine 0.87    1.14 1.10   eGFR Non African Am 92    67    Sodium 132 (A)     141    Potassium 3.0 (A) 3.0 (A) 3.3 (A) 3.6 3.0 (A)    Chloride 95 (A)    102    Calcium 8.4 (A)    8.6    (A) Abnormal value       Comments are available for some flowsheets but are not being displayed.           CBC w/diff    CBC w/Diff 1/27/21 1/28/21 9/24/21   WBC 11.75 (A) 9.25 8.46   RBC 5.48 4.66 5.00   Hemoglobin 15.0 12.5 (A) 13.7   Hematocrit 41.4 36.3 (A) 40.1   MCV 75.5 (A) 77.9 (A) 80.2   MCH 27.4 26.8 27.4   MCHC 36.2 (A) 34.4 34.2   RDW 13.5 13.8 13.7   Platelets 103 (A) 75 (A) 111 (A)   Neutrophil Rel % 74.5 70.5    Immature Granulocyte Rel % 0.3 0.3    Lymphocyte Rel % 14.8 (A) 17.8 (A)    Monocyte Rel % 5.7 6.2    Eosinophil Rel % 3.4 4.2    Basophil Rel % 1.3 1.0    (A) Abnormal value            Renal Profile    Renal Profile 1/27/21 1/28/21 9/24/21 9/24/21      0712 0720   BUN 16 14 17    Creatinine 1.01 0.87 1.14 1.10   eGFR Non  Am 78 92 67       Comments are available for some flowsheets but are not being displayed.           UA    Urinalysis 1/27/21 11/10/21   Specific [place], UA 1.018    Ketones, UA Negative Negative   Blood, UA Negative    Leukocytes, UA Negative Negative   Nitrite, UA Negative                Data reviewed: Radiologic studies KUB done 10/11/2021 - for any stones.  No acute findings noted          Assessment and Plan    Diagnoses and all orders for this visit:    1. Flank pain (Primary)  -     POC Urinalysis Dipstick, Automated  -     ketorolac (TORADOL) 10 MG tablet; Take 1 tablet by mouth Every 6 (Six) Hours As Needed for Moderate Pain .  Dispense: 16 tablet; Refill: 0  -     promethazine (PHENERGAN) 25 MG tablet; Take 1 tablet by mouth Every 12 (Twelve) Hours As Needed for Nausea.  Dispense: 20 tablet; Refill: 0  -     tamsulosin (FLOMAX) 0.4 MG capsule 24 hr capsule; Take 1 capsule by mouth Daily.  Dispense: 30 capsule; Refill: 1    2. Left ureteral calculus  -     CT Abdomen Pelvis Without Contrast; Future  -     tamsulosin (FLOMAX) 0.4 MG capsule 24 hr  capsule; Take 1 capsule by mouth Daily.  Dispense: 30 capsule; Refill: 1    3. Uric acid urolithiasis  -     CT Abdomen Pelvis Without Contrast; Future  -     ketorolac (TORADOL) 10 MG tablet; Take 1 tablet by mouth Every 6 (Six) Hours As Needed for Moderate Pain .  Dispense: 16 tablet; Refill: 0  -     promethazine (PHENERGAN) 25 MG tablet; Take 1 tablet by mouth Every 12 (Twelve) Hours As Needed for Nausea.  Dispense: 20 tablet; Refill: 0  -     tamsulosin (FLOMAX) 0.4 MG capsule 24 hr capsule; Take 1 capsule by mouth Daily.  Dispense: 30 capsule; Refill: 1    4. Bilateral flank pain  -     CT Abdomen Pelvis Without Contrast; Future  -     ketorolac (TORADOL) 10 MG tablet; Take 1 tablet by mouth Every 6 (Six) Hours As Needed for Moderate Pain .  Dispense: 16 tablet; Refill: 0  -     promethazine (PHENERGAN) 25 MG tablet; Take 1 tablet by mouth Every 12 (Twelve) Hours As Needed for Nausea.  Dispense: 20 tablet; Refill: 0  -     tamsulosin (FLOMAX) 0.4 MG capsule 24 hr capsule; Take 1 capsule by mouth Daily.  Dispense: 30 capsule; Refill: 1                    BILATERAL FLANK PAIN/KIDNEY STONES/MICROHEMATURIA  Pleasant patient evaluated in clinic today for bilateral flank pain.  He has a significant history of kidney stones.  We both reviewed/discussed his KUB results today which are unremarkable, patient has been scheduled for CT abdomen and pelvis kidney stone protocol.      We discussed treatment options for his flank pain with patient encouraged to continue conservative therapy.  He is to alternate NSAIDs/Tylenol for his pain and discomfort, increase ambulation, hot baths, hot showers as tolerated.  Rest is the most important treatment in the case of flank pain. Rest and physical therapy are enough to improve minor pain.  Discussed to monitor her daily routine with prevention of flank pain by: At least Drinking 8 glasses of water per day, Limiting your alcohol consumption.  Having a healthy diet containing  fruits, vegetables, and a lot of fluids, Practicing safe sex.  Also maintaining proper hygiene of your body as well as the environment.    Also reviewed his prior KUB which is also negative for stones.  We further discussed a uric acid kidney stone prevention diet to include increasing p.o. fluid intake, to at least 1 to 2 L of water daily.  He is to avoid caffeine products such as cola, coffee, and to avoid soft or soda drinks. We also discussed the importance of decreasing his  sodium consumption as in  Fast foods, valencia, salted nuts, canned foods, and smoked/cured foods.  He is also to decrease his oxalate consumption, as in spinach, Johanna greens, and Rhubarb.  Also important is to decrease protein intake, as in red meats, peanut butter, and also avoid nuts.    Follow-up post CT abdomen and pelvis for definitive plan of care with Dr. Baez    Continue all other medications as prescribed    Patient is agreeable to plan of care,    Follow Up   Return in about 6 days (around 11/16/2021) for Next scheduled follow up, With Dr. Baez, Review CT Scan/GO TO ER IF WORSENING.     Patient was given instructions and counseling regarding his condition or for health maintenance advice. Please see specific information pulled into the AVS if appropriate.

## 2021-11-11 ENCOUNTER — TELEPHONE (OUTPATIENT)
Dept: UROLOGY | Facility: CLINIC | Age: 51
End: 2021-11-11

## 2021-11-11 NOTE — TELEPHONE ENCOUNTER
The pt has a follow up on the 16th and there is no evidence of a stone. So we have to wait on the CT.

## 2021-11-11 NOTE — TELEPHONE ENCOUNTER
PT CALLED TO SEE IF WE CAN GIVE PAIN MEDS UNTIL HIS APPT. PT STATES HE WAS IN ER LAST NIGHT AND WAS ADVISED TO CALL HIS UROLOGIST.

## 2021-11-15 ENCOUNTER — OFFICE VISIT (OUTPATIENT)
Dept: CARDIOLOGY | Facility: CLINIC | Age: 51
End: 2021-11-15

## 2021-11-15 VITALS
SYSTOLIC BLOOD PRESSURE: 105 MMHG | OXYGEN SATURATION: 97 % | WEIGHT: 299 LBS | BODY MASS INDEX: 40.5 KG/M2 | HEART RATE: 82 BPM | HEIGHT: 72 IN | DIASTOLIC BLOOD PRESSURE: 74 MMHG

## 2021-11-15 DIAGNOSIS — I25.119 CORONARY ARTERY DISEASE INVOLVING NATIVE CORONARY ARTERY OF NATIVE HEART WITH ANGINA PECTORIS (HCC): Primary | ICD-10-CM

## 2021-11-15 DIAGNOSIS — R06.02 SHORTNESS OF BREATH: ICD-10-CM

## 2021-11-15 DIAGNOSIS — I48.0 PAROXYSMAL ATRIAL FIBRILLATION (HCC): ICD-10-CM

## 2021-11-15 PROCEDURE — 99213 OFFICE O/P EST LOW 20 MIN: CPT | Performed by: PHYSICIAN ASSISTANT

## 2021-11-15 NOTE — PATIENT INSTRUCTIONS

## 2021-11-15 NOTE — PROGRESS NOTES
Problem list     Subjective   Ricardo Crespo is a 51 y.o. male     Chief Complaint   Patient presents with   • Follow-up     cath [facility] 9/24/21 report in chart   PROBLEM LIST:      1. Coronary artery disease  1.1. Stenting of the LAD, 11/2015, per Dr. Moss.  1.2. Repeat catheterization 2-3 weeks after stenting as above, indicating patent stent with 40% PDA stenosis. Medical management was recommended.  1.3. Repeat catheterization, 01/2016, indicating nonobstructive disease with patent stent. Medical management was recommended.  1.4.  Stenting of the PDA, 03/2017, in the setting of low-level symptoms and abnormal stress test.  Previous stent to the LAD was patent.  The patient had nonobstructive disease otherwise.  Medical management was recommended.  1.5.  Stenting of the mid RCA and PTCA only of the distal LAD, 09/17.  1.6.  Repeat LHC, 11/17, in the setting of acute chest pain.  The patient had 50% LAD stenosis, patent stents, and non-obstructive CAD otherwise.  1. 7 repeat left heart catheter March 2018 with stenting of the LAD ×2.  Nonobstructive disease otherwise and patent stent with medical management recommended  1.8 left heart cath 10/16/18-30-40% LAD, 40% apical branch, 10% circumflex left, right coronary artery 10%, EF 60%  1.9 LHC, 2-8-19, EF 55-60%, demonstrated widely patent epicardial coronary arteries with diffuse non-obstructive disease with empiric therapy for ischemia and risk factor manag.  1.10 Repeat left heart catheterization, 9/2021, in the setting of low level symptoms compatible with angina/ischemia.  The patient had small vessel disease with nonobstructive disease noted otherwise.  No targets for intervention were noted.  Medical management was recommended.  Previously placed stents were patent.  2. Preserved systolic function  3. Hypertension  4. Dyslipidemia  5. Diabetes mellitus  6. Sleep apnea  7. Reported history of DVT   8. Atrial Fibrillation      HPI  The patient  presents into the clinic today for follow-up post catheterization.  He was scheduled for catheterization because of significant low level symptoms.  Catheterization supported stable anatomy with patent previously placed stents.  At this time, the patient is doing well.  Cath site is healed well.  He reports still chest pain, mostly reported as chest tightness with exertion or when stressed.  He takes nitro which alleviates his chest discomfort now almost immediately.  His dyspnea is at baseline.  He has no failure or dysrhythmic symptoms.  He has no further complaints at this time.      Current Outpatient Medications on File Prior to Visit   Medication Sig Dispense Refill   • aspirin 325 MG tablet Take 325 mg by mouth Every Morning.     • Evolocumab (REPATHA) solution auto-injector SureClick injection Inject 1 mL under the skin into the appropriate area as directed Every 14 (Fourteen) Days. 2 pen 11   • furosemide (LASIX) 40 MG tablet Take 40 mg by mouth 2 (Two) Times a Day.     • Insulin Infusion Pump device humalog     • lithium carbonate 300 MG capsule Take  by mouth 2 (two) times a day. 900 mg po qam and 600 mg qpm  2   • nitroglycerin (NITROSTAT) 0.4 MG SL tablet      • ranolazine (RANEXA) 1000 MG 12 hr tablet Take 1,000 mg by mouth 2 (Two) Times a Day.     • sertraline (ZOLOFT) 100 MG tablet Take 200 mg by mouth Daily.  2   • sildenafil (VIAGRA) 100 MG tablet Take 1/4 TO 1 TABLET DAILY AS NEEDED 30 minutes prior TO intercourse     • [DISCONTINUED] clopidogrel (PLAVIX) 75 MG tablet Take 1 tablet by mouth Daily. 30 tablet 11     No current facility-administered medications on file prior to visit.       Beta adrenergic blockers, Calcium channel blockers, and Imdur [isosorbide dinitrate]    Past Medical History:   Diagnosis Date   • Abnormal ECG    • Alcohol liver damage (HCC)    • Anxiety    • Arrhythmia    • Atrial fibrillation (HCC)    • Benign hypertension    • Bipolar depression (HCC)    • CHF (congestive  heart failure) (HCC)    • Cirrhosis of liver (HCC)    • Clotting disorder (HCC)    • Coronary artery disease    • DVT (deep venous thrombosis) (HCC)     Questionable history of deep venous thrombosis.   • Dyslipidemia    • Hyperlipidemia    • Myocardial infarction (HCC)     x 2   • Obesity    • JIM on CPAP     Obstructive sleep apnea, compliant with CPAP.    • Type 2 diabetes mellitus (HCC)    • Valvular disease        Social History     Socioeconomic History   • Marital status:    Tobacco Use   • Smoking status: Former Smoker     Packs/day: 1.00     Years: 15.00     Pack years: 15.00     Types: Cigarettes     Start date: 1985     Quit date: 2008     Years since quittin.8   • Smokeless tobacco: Never Used   • Tobacco comment: Quit 12 years ago; smoked 17 years at 1 PPD   Substance and Sexual Activity   • Alcohol use: No     Comment: Quit 12 years ago; drank a fifth a day for 20 years   • Drug use: No   • Sexual activity: Yes     Partners: Female     Birth control/protection: None       Family History   Problem Relation Age of Onset   • Heart disease Father         CAD, 25 year old   • Nephrolithiasis Father    • Heart attack Father    • Atrial fibrillation Mother    • Arrhythmia Mother    • No Known Problems Sister        Review of Systems   Constitutional: Negative.  Negative for chills and fatigue.   HENT: Negative.  Negative for congestion, rhinorrhea and sore throat.    Eyes: Positive for visual disturbance (glasses).   Respiratory: Positive for chest tightness and shortness of breath. Negative for wheezing.    Cardiovascular: Positive for palpitations and leg swelling. Negative for chest pain.   Gastrointestinal: Negative.    Endocrine: Negative.    Genitourinary: Negative.    Musculoskeletal: Positive for arthralgias and back pain. Negative for neck pain.   Skin: Negative.  Negative for rash and wound.   Allergic/Immunologic: Negative.  Negative for environmental allergies.   Neurological:  "Negative.  Negative for dizziness, weakness, numbness and headaches.   Hematological: Bruises/bleeds easily (bruises/ bleeds).   Psychiatric/Behavioral: Negative.  Negative for sleep disturbance.       Objective   Vitals:    11/15/21 1632   BP: 105/74   BP Location: Left arm   Patient Position: Sitting   Pulse: 82   SpO2: 97%   Weight: 136 kg (299 lb)   Height: 182.9 cm (72\")      /74 (BP Location: Left arm, Patient Position: Sitting)   Pulse 82   Ht 182.9 cm (72\")   Wt 136 kg (299 lb)   SpO2 97%   BMI 40.55 kg/m²    Lab Results (most recent)     None        Physical Exam  Vitals and nursing note reviewed.   Constitutional:       General: He is not in acute distress.     Appearance: He is well-developed.   HENT:      Head: Normocephalic and atraumatic.   Eyes:      Conjunctiva/sclera: Conjunctivae normal.      Pupils: Pupils are equal, round, and reactive to light.   Neck:      Vascular: No JVD.      Trachea: No tracheal deviation.   Cardiovascular:      Rate and Rhythm: Normal rate and regular rhythm.      Heart sounds: Normal heart sounds.   Pulmonary:      Effort: Pulmonary effort is normal.      Breath sounds: Normal breath sounds.   Abdominal:      General: Bowel sounds are normal. There is no distension.      Palpations: Abdomen is soft. There is no mass.      Tenderness: There is no abdominal tenderness. There is no guarding or rebound.   Musculoskeletal:         General: No tenderness or deformity. Normal range of motion.      Cervical back: Normal range of motion and neck supple.   Skin:     General: Skin is warm and dry.      Coloration: Skin is not pale.      Findings: No erythema or rash.   Neurological:      Mental Status: He is alert and oriented to person, place, and time.   Psychiatric:         Behavior: Behavior normal.         Thought Content: Thought content normal.         Judgment: Judgment normal.           Procedure   Procedures       Assessment/Plan      Diagnosis Plan   1. " Coronary artery disease involving native coronary artery of native heart with angina pectoris (HCC)     2. Shortness of breath     3. Paroxysmal atrial fibrillation (HCC)       1.  The patient has stable anatomy by catheterization results as above.  He has no targets for intervention.  We will continue to treat him medically.    2.  He may have a component of vasospastic angina.  We have discussed the importance of sublingual nitro to take as needed for chest discomfort.  We have also discussed the possibility of intensified medical regimen directed at potential vasospastic angina.  He feels well enough and is doing well enough with sublingual nitro that he does not want to institute any further therapies.  We would have limitations with his normotensive to hypotensive status as baseline.    3.  In that setting, nothing further for now.  We had to discontinue Multaq because of baseline liver status.  I would hold on further antidysrhythmic therapy for now as he appears to be mostly asymptomatic with A. fib.  We have discussed consideration for referral on to EP services for consideration of possible ablation if felt to be a candidate.  He would like to hold on that for now but will be open to discussing that in the future.    4.  For now, no changes in medications.  We will continue to see him on routine 6-month intervals.  I have asked that he follow-up with his primary care provider routinely for laboratories.                     Electronically signed by:

## 2021-11-18 RX ORDER — KETOROLAC TROMETHAMINE 10 MG/1
10 TABLET, FILM COATED ORAL EVERY 6 HOURS PRN
Qty: 16 TABLET | Refills: 0 | COMMUNITY
Start: 2021-11-18 | End: 2023-01-24 | Stop reason: ALTCHOICE

## 2021-11-18 RX ORDER — TAMSULOSIN HYDROCHLORIDE 0.4 MG/1
1 CAPSULE ORAL DAILY
Qty: 30 CAPSULE | Refills: 1 | COMMUNITY
Start: 2021-11-18 | End: 2023-01-24 | Stop reason: ALTCHOICE

## 2021-11-18 RX ORDER — PROMETHAZINE HYDROCHLORIDE 25 MG/1
25 TABLET ORAL EVERY 12 HOURS PRN
Qty: 20 TABLET | Refills: 0 | COMMUNITY
Start: 2021-11-18 | End: 2023-01-24 | Stop reason: ALTCHOICE

## 2021-11-19 NOTE — PATIENT INSTRUCTIONS
Low-Purine Eating Plan  A low-purine eating plan involves making food choices to limit your intake of purine. Purine is a kind of uric acid. Too much uric acid in your blood can cause certain conditions, such as gout and kidney stones. Eating a low-purine diet can help control these conditions.  What are tips for following this plan?  Reading food labels  · Avoid foods with saturated or Trans fat.  · Check the ingredient list of grains-based foods, such as bread and cereal, to make sure that they contain whole grains.  · Check the ingredient list of sauces or soups to make sure they do not contain meat or fish.  · When choosing soft drinks, check the ingredient list to make sure they do not contain high-fructose corn syrup.  Shopping    · Buy plenty of fresh fruits and vegetables.  · Avoid buying canned or fresh fish.  · Buy dairy products labeled as low-fat or nonfat.  · Avoid buying premade or processed foods. These foods are often high in fat, salt (sodium), and added sugar.    Cooking  · Use olive oil instead of butter when cooking. Oils like olive oil, canola oil, and sunflower oil contain healthy fats.  Meal planning  · Learn which foods do or do not affect you. If you find out that a food tends to cause your gout symptoms to flare up, avoid eating that food. You can enjoy foods that do not cause problems. If you have any questions about a food item, talk with your dietitian or health care provider.  · Limit foods high in fat, especially saturated fat. Fat makes it harder for your body to get rid of uric acid.  · Choose foods that are lower in fat and are lean sources of protein.  General guidelines  · Limit alcohol intake to no more than 1 drink a day for nonpregnant women and 2 drinks a day for men. One drink equals 12 oz of beer, 5 oz of wine, or 1½ oz of hard liquor. Alcohol can affect the way your body gets rid of uric acid.  · Drink plenty of water to keep your urine clear or pale yellow. Fluids can help  remove uric acid from your body.  · If directed by your health care provider, take a vitamin C supplement.  · Work with your health care provider and dietitian to develop a plan to achieve or maintain a healthy weight. Losing weight can help reduce uric acid in your blood.  What foods are recommended?  The items listed may not be a complete list. Talk with your dietitian about what dietary choices are best for you.  Foods low in purines  Foods low in purines do not need to be limited. These include:  · All fruits.  · All low-purine vegetables, pickles, and olives.  · Breads, pasta, rice, cornbread, and popcorn. Cake and other baked goods.  · All dairy foods.  · Eggs, nuts, and nut butters.  · Spices and condiments, such as salt, herbs, and vinegar.  · Plant oils, butter, and margarine.  · Water, sugar-free soft drinks, tea, coffee, and cocoa.  · Vegetable-based soups, broths, sauces, and gravies.  Foods moderate in purines  Foods moderate in purines should be limited to the amounts listed.  · ½ cup of asparagus, cauliflower, spinach, mushrooms, or green peas, each day.  · 2/3 cup uncooked oatmeal, each day.  · ¼ cup dry wheat bran or wheat germ, each day.  · 2-3 ounces of meat or poultry, each day.  · 4-6 ounces of shellfish, such as crab, lobster, oysters, or shrimp, each day.  · 1 cup cooked beans, peas, or lentils, each day.  · Soup, broths, or bouillon made from meat or fish. Limit these foods as much as possible.  What foods are not recommended?  The items listed may not be a complete list. Talk with your dietitian about what dietary choices are best for you.  Limit your intake of foods high in purines, including:  · Beer and other alcohol.  · Meat-based gravy or sauce.  · Canned or fresh fish, such as:  ? Anchovies, sardines, herring, and tuna.  ? Mussels and scallops.  ? Codfish, trout, and chanel.  · [place].  · Organ meats, such as:  ? Liver or kidney.  ? Tripe.  ? Sweetbreads (thymus gland or  pancreas).  · Wild game or goose.  · Yeast or yeast extract supplements.  · Drinks sweetened with high-fructose corn syrup.  Summary  · Eating a low-purine diet can help control conditions caused by too much uric acid in the body, such as gout or kidney stones.  · Choose low-purine foods, limit alcohol, and limit foods high in fat.  · You will learn over time which foods do or do not affect you. If you find out that a food tends to cause your gout symptoms to flare up, avoid eating that food.  This information is not intended to replace advice given to you by your health care provider. Make sure you discuss any questions you have with your health care provider.  Document Revised: 04/01/2021 Document Reviewed: 04/01/2021  [employer] Patient Education © 2021 [employer]      Flank Pain, Adult  Flank pain is pain in your side. The flank is the area of your side between your upper belly (abdomen) and your back. The pain may occur over a short time (acute), or it may be long-term or come back often (chronic). It may be mild or very bad. Pain in this area can be caused by many different things.  Follow these instructions at home:    · Drink enough fluid to keep your pee (urine) clear or pale yellow.  · Rest as told by your doctor.  · Take over-the-counter and prescription medicines only as told by your doctor.  · Keep a journal to keep track of:  ? What has caused your flank pain.  ? What has made it feel better.  · Keep all follow-up visits as told by your doctor. This is important.  Contact a doctor if:  · Medicine does not help your pain.  · You have new symptoms.  · Your pain gets worse.  · You have a fever.  · Your symptoms last longer than 2-3 days.  · You have trouble peeing.  · You are peeing more often than normal.  Get help right away if:  · You have trouble breathing.  · You are short of breath.  · Your belly hurts, or it is swollen or red.  · You feel sick to your stomach (nauseous).  · You throw up  (vomit).  · You feel like you will pass out, or you do pass out (faint).  · You have blood in your pee.  Summary  · Flank pain is pain in your side. The flank is the area of your side between your upper belly (abdomen) and your back.  · Flank pain may occur over a short time (acute), or it may be long-term or come back often (chronic). It may be mild or very bad.  · Pain in this area can be caused by many different things.  · Contact your doctor if your symptoms get worse or they last longer than 2-3 days.  This information is not intended to replace advice given to you by your health care provider. Make sure you discuss any questions you have with your health care provider.  Document Revised: 11/30/2018 Document Reviewed: 04/09/2018  [employer] Patient Education © 2021 [employer]  Uric Acid Nephropathy    Uric acid is a chemical compound that is made when your body digests some kinds of food and also when your body breaks down dead cells. It is a waste product that is normally removed from your body by your kidneys. If you have too much uric acid in your blood, it can build up in your kidneys and cause damage (nephropathy).  There are two types of uric acid nephropathy:  · Sudden (acute) uric acid nephropathy results from a sudden buildup of uric acid.  · Long-term (chronic) uric acid nephropathy results from a slow buildup of uric acid over a long period of time.  What are the causes?  The exact cause of this condition may depend on the type of uric acid nephropathy:  · Acute uric acid nephropathy may be caused by:  ? Receiving medicines for the treatment of cancer (chemotherapy). Use of these medicines causes rapid breakdown of cells. The cell breakdown produces excess uric acid. As uric acid builds up in your kidneys, it causes an increase of pressure and a loss of blood supply. This makes your kidneys less able to filter blood and make urine.  ? A tumor (cancer) in the body.  ? Seizures.  ? Taking medicines  that can cause excess uric acid. Examples are aspirin, water pills (diuretics), and medicines that are prescribed after an organ transplant.  ? Severe diarrhea, which causes fluid loss (dehydration).  · Chronic uric acid nephropathy may happen if you have high levels of uric acid in your body on a regular basis. One reason you may have high levels of uric acid is gout. With gout, excess uric acid forms into crystals. These crystals can get stuck inside joints and cause painful swelling. They may also build up in your kidneys and cause long-term damage.  What increases the risk?  You may be more likely to develop this condition if you:  · Are male.  · Are 50 years old or older.  · Have gout.  · Eat a lot of foods that are high in certain natural chemical compounds (purines). Shellfish and red meat contain a lot of purines.  · Drink alcohol.  · Have recently had heart surgery.  What are the signs or symptoms?  Signs and symptoms depend on the type of nephropathy that you have. They may include:  · Decreased urine output.  · Nausea and vomiting.  · Lack of energy.  · Seizures.  · Blood-tinged urine.  · Pain when passing urine.  · Pain in the sides of the lower back (flank pain).  In some cases, there are no symptoms.  How is this diagnosed?  Your health care provider may suspect uric acid nephropathy from your signs and symptoms, especially if you have gout. He or she may:  · Do a physical exam.  · Order tests to confirm the diagnosis. Tests may include:  ? Blood and urine tests. This is the best way to measure high levels of uric acid.  ? Imaging studies to check for kidney stones or kidney damage. These may include:  § X-rays.  § Ultrasound.  § CT scan.  § MRI.  How is this treated?  The goal of treatment is to lower the level of uric acid in your body and prevent kidney damage. This can be done by:  · Taking medicines that block the production of uric acid. The most commonly used medicine is allopurinol. If you are  starting chemotherapy, ask your health care provider if you should start taking a medicine to prevent high uric acid.  · Starting a diet plan that lowers your intake of purines. Work with a diet and nutrition specialist (dietitian) to limit your intake of foods and drinks that increase uric acid.  · Preventing uric acid buildup. Drink plenty of water to maintain a good flow of urine and to lower the acidity of your urine. You may also need to take a medicine called bicarbonate.  · Resting the kidneys. This can be done by using a machine to clean your blood (hemodialysis), if necessary. In hemodialysis, your blood is removed, passed through a filtering machine, and then returned to your body. Several sessions of hemodialysis usually improve kidney function by removing uric acid.  Follow these instructions at home:  Eating and drinking         · Drink enough fluid to keep your urine pale yellow.  · Do not drink alcohol.  · Do not drink beverages that contain a type of sugar called fructose.  · Limit how much red meat and shellfish you eat.  · Include plenty of low-fat dairy foods in your diet.  General instructions  · Take over-the-counter and prescription medicines only as told by your health care provider.  · Maintain a healthy weight. Lose weight as directed by your health care provider.  · Keep all follow-up visits as told by your health care provider. This is important.  Contact a health care provider if you:  · Feel tired and have low energy, even when you get enough sleep.  · Have pain when passing urine.  · Have nausea or vomiting.  Get help right away if you:  · Produce very little urine, even when you drink enough fluids.  · Have blood in your urine.  · Have a seizure.  Summary  · Uric acid is a waste product that is normally removed from your body by your kidneys. If you have too much uric acid in your blood, it can build up in your kidneys and cause damage (nephropathy).  · Sudden (acute) uric acid  nephropathy results from a sudden buildup of uric acid. Long-term (chronic) uric acid nephropathy results from a slow buildup of uric acid over a long period of time. This may happen if you have gout.  · The goal of treatment is to lower the level of uric acid in your body and prevent kidney damage.  This information is not intended to replace advice given to you by your health care provider. Make sure you discuss any questions you have with your health care provider.  Document Revised: 04/07/2020 Document Reviewed: 01/08/2019  [employer] Patient Education © 2021 [employer]

## 2021-12-24 DIAGNOSIS — R07.2 PRECORDIAL PAIN: Primary | ICD-10-CM

## 2021-12-24 RX ORDER — NITROGLYCERIN 0.4 MG/1
TABLET SUBLINGUAL
Qty: 25 TABLET | Refills: 1 | Status: SHIPPED | OUTPATIENT
Start: 2021-12-24 | End: 2022-09-14

## 2022-01-20 ENCOUNTER — HOSPITAL ENCOUNTER (OUTPATIENT)
Dept: HOSPITAL 79 - ER1 | Age: 52
Setting detail: OBSERVATION
LOS: 2 days | Discharge: HOME | End: 2022-01-22
Attending: STUDENT IN AN ORGANIZED HEALTH CARE EDUCATION/TRAINING PROGRAM | Admitting: STUDENT IN AN ORGANIZED HEALTH CARE EDUCATION/TRAINING PROGRAM
Payer: COMMERCIAL

## 2022-01-20 VITALS — HEIGHT: 72 IN | WEIGHT: 280 LBS | BODY MASS INDEX: 37.93 KG/M2

## 2022-01-20 DIAGNOSIS — Z79.899: ICD-10-CM

## 2022-01-20 DIAGNOSIS — F32.A: ICD-10-CM

## 2022-01-20 DIAGNOSIS — E11.9: ICD-10-CM

## 2022-01-20 DIAGNOSIS — F41.9: ICD-10-CM

## 2022-01-20 DIAGNOSIS — Z79.82: ICD-10-CM

## 2022-01-20 DIAGNOSIS — K74.60: ICD-10-CM

## 2022-01-20 DIAGNOSIS — D69.6: ICD-10-CM

## 2022-01-20 DIAGNOSIS — Z87.891: ICD-10-CM

## 2022-01-20 DIAGNOSIS — I25.10: ICD-10-CM

## 2022-01-20 DIAGNOSIS — Z79.4: ICD-10-CM

## 2022-01-20 DIAGNOSIS — U07.1: Primary | ICD-10-CM

## 2022-01-20 DIAGNOSIS — Z79.01: ICD-10-CM

## 2022-01-20 DIAGNOSIS — I48.0: ICD-10-CM

## 2022-01-20 DIAGNOSIS — F31.9: ICD-10-CM

## 2022-01-20 DIAGNOSIS — Z88.8: ICD-10-CM

## 2022-01-20 DIAGNOSIS — Z95.5: ICD-10-CM

## 2022-01-20 DIAGNOSIS — I25.2: ICD-10-CM

## 2022-01-20 LAB
BUN/CREATININE RATIO: 19 (ref 0–10)
HGB BLD-MCNC: 15.3 GM/DL (ref 14–17.5)
RED BLOOD COUNT: 5.44 M/UL (ref 4.2–5.5)
WHITE BLOOD COUNT: 15.6 K/UL (ref 4.5–11)

## 2022-01-20 PROCEDURE — G0378 HOSPITAL OBSERVATION PER HR: HCPCS

## 2022-01-21 LAB
BUN/CREATININE RATIO: 17 (ref 0–10)
HGB BLD-MCNC: 13.7 GM/DL (ref 14–17.5)
RED BLOOD COUNT: 4.95 M/UL (ref 4.2–5.5)
WHITE BLOOD COUNT: 13.9 K/UL (ref 4.5–11)

## 2022-03-27 ENCOUNTER — HOSPITAL ENCOUNTER (OUTPATIENT)
Dept: HOSPITAL 79 - ER1 | Age: 52
Setting detail: OBSERVATION
LOS: 1 days | Discharge: HOME | End: 2022-03-28
Attending: INTERNAL MEDICINE | Admitting: INTERNAL MEDICINE
Payer: COMMERCIAL

## 2022-03-27 VITALS — BODY MASS INDEX: 41.58 KG/M2 | WEIGHT: 307 LBS | HEIGHT: 72 IN

## 2022-03-27 DIAGNOSIS — I50.30: ICD-10-CM

## 2022-03-27 DIAGNOSIS — E87.6: ICD-10-CM

## 2022-03-27 DIAGNOSIS — D69.6: ICD-10-CM

## 2022-03-27 DIAGNOSIS — Z79.01: ICD-10-CM

## 2022-03-27 DIAGNOSIS — Z95.5: ICD-10-CM

## 2022-03-27 DIAGNOSIS — Z79.82: ICD-10-CM

## 2022-03-27 DIAGNOSIS — Z87.891: ICD-10-CM

## 2022-03-27 DIAGNOSIS — I11.0: ICD-10-CM

## 2022-03-27 DIAGNOSIS — G47.33: ICD-10-CM

## 2022-03-27 DIAGNOSIS — Z79.899: ICD-10-CM

## 2022-03-27 DIAGNOSIS — I25.119: Primary | ICD-10-CM

## 2022-03-27 DIAGNOSIS — Z82.49: ICD-10-CM

## 2022-03-27 DIAGNOSIS — I48.0: ICD-10-CM

## 2022-03-27 DIAGNOSIS — Z88.8: ICD-10-CM

## 2022-03-27 DIAGNOSIS — E78.5: ICD-10-CM

## 2022-03-27 DIAGNOSIS — E11.9: ICD-10-CM

## 2022-03-27 DIAGNOSIS — Z20.822: ICD-10-CM

## 2022-03-27 DIAGNOSIS — E66.9: ICD-10-CM

## 2022-03-27 DIAGNOSIS — Z99.89: ICD-10-CM

## 2022-03-27 DIAGNOSIS — Z79.4: ICD-10-CM

## 2022-03-27 DIAGNOSIS — Z86.16: ICD-10-CM

## 2022-03-27 LAB
BUN/CREATININE RATIO: 14 (ref 0–10)
HGB BLD-MCNC: 12.6 GM/DL (ref 14–17.5)
RED BLOOD COUNT: 4.5 M/UL (ref 4.2–5.5)
WHITE BLOOD COUNT: 8.6 K/UL (ref 4.5–11)

## 2022-03-27 PROCEDURE — U0002 COVID-19 LAB TEST NON-CDC: HCPCS

## 2022-03-27 PROCEDURE — G0378 HOSPITAL OBSERVATION PER HR: HCPCS

## 2022-03-27 NOTE — NUR
PATIENT DOES NOT HAVE MEDICATION BOTTLES WITH HIM CURRENTLY WILL HAVE PHARMAC
ADDRESS MEDS IN AM OR FAMILY BRING PATIENT MEDICATION FROM HOME.

## 2022-03-28 LAB
BUN/CREATININE RATIO: 13 (ref 0–10)
BUN/CREATININE RATIO: 14 (ref 0–10)

## 2022-04-04 ENCOUNTER — OFFICE VISIT (OUTPATIENT)
Dept: CARDIOLOGY | Facility: CLINIC | Age: 52
End: 2022-04-04

## 2022-04-04 VITALS
HEIGHT: 72 IN | HEART RATE: 83 BPM | BODY MASS INDEX: 40.88 KG/M2 | WEIGHT: 301.8 LBS | OXYGEN SATURATION: 97 % | SYSTOLIC BLOOD PRESSURE: 119 MMHG | DIASTOLIC BLOOD PRESSURE: 77 MMHG

## 2022-04-04 DIAGNOSIS — I48.0 PAROXYSMAL ATRIAL FIBRILLATION: ICD-10-CM

## 2022-04-04 DIAGNOSIS — R00.2 PALPITATIONS: ICD-10-CM

## 2022-04-04 DIAGNOSIS — R06.02 SHORTNESS OF BREATH: ICD-10-CM

## 2022-04-04 DIAGNOSIS — I25.119 CORONARY ARTERY DISEASE INVOLVING NATIVE CORONARY ARTERY OF NATIVE HEART WITH ANGINA PECTORIS: Primary | ICD-10-CM

## 2022-04-04 PROCEDURE — 99214 OFFICE O/P EST MOD 30 MIN: CPT | Performed by: PHYSICIAN ASSISTANT

## 2022-04-04 NOTE — PROGRESS NOTES
Problem list     Subjective   Ricardo Crespo is a 51 y.o. male     Chief Complaint   Patient presents with   • Follow-up     [facility] records in chart    • Coronary Artery Disease   • Palpitations   • Chest Pain   PROBLEM LIST:      1. Coronary artery disease  1.1. Stenting of the LAD, 11/2015, per Dr. Moss.  1.2. Repeat catheterization 2-3 weeks after stenting as above, indicating patent stent with 40% PDA stenosis. Medical management was recommended.  1.3. Repeat catheterization, 01/2016, indicating nonobstructive disease with patent stent. Medical management was recommended.  1.4.  Stenting of the PDA, 03/2017, in the setting of low-level symptoms and abnormal stress test.  Previous stent to the LAD was patent.  The patient had nonobstructive disease otherwise.  Medical management was recommended.  1.5.  Stenting of the mid RCA and PTCA only of the distal LAD, 09/17.  1.6.  Repeat LHC, 11/17, in the setting of acute chest pain.  The patient had 50% LAD stenosis, patent stents, and non-obstructive CAD otherwise.  1. 7 repeat left heart catheter March 2018 with stenting of the LAD ×2.  Nonobstructive disease otherwise and patent stent with medical management recommended  1.8 left heart cath 10/16/18-30-40% LAD, 40% apical branch, 10% circumflex left, right coronary artery 10%, EF 60%  1.9 LHC, 2-8-19, EF 55-60%, demonstrated widely patent epicardial coronary arteries with diffuse non-obstructive disease with empiric therapy for ischemia and risk factor manag.  1.10 Repeat left heart catheterization, 9/2021, in the setting of low level symptoms compatible with angina/ischemia.  The patient had small vessel disease with nonobstructive disease noted otherwise.  No targets for intervention were noted.  Medical management was recommended.  Previously placed stents were patent.  2. Preserved systolic function  3. Hypertension  4. Dyslipidemia  5. Diabetes mellitus  6. Sleep apnea  7. Reported history of  DVT   8. Atrial Fibrillation         HPI    The patient presents back to the clinic today after ER presentation recently.  He has had numerous ER evaluations recently.  He mostly presents to the ER with significant chest pain and even palpitations at times.  He was recommended to have a stress test apparently after recent ER evaluation.  He tried to do treadmill protocol but was unable to complete that and that was discontinued by his report.  He was advised to follow-up in the clinic today to consider catheterization.  His last cath which would have been just 6 months ago, supported nonobstructive disease which we have opted to manage medically.  He tells me that all of his chest pain most often as of recent occurs with palpitations.  He will have onset of tachycardia, which is very irregular and forceful.  He he feels that this is mostly A. fib, as this feels exactly what he had with A. fib in the past.  He tells me that his chest pain occurs while experiencing dysrhythmic activity and resolves once the palpitations stopped.  He has no dizziness or syncope at that time.  His dyspnea does intensify as well.  He has no further complaints at this time.  Current Outpatient Medications on File Prior to Visit   Medication Sig Dispense Refill   • aspirin 325 MG tablet Take 325 mg by mouth Every Morning.     • furosemide (LASIX) 40 MG tablet Take 40 mg by mouth 2 (Two) Times a Day.     • Insulin Infusion Pump device humalog     • ketorolac (TORADOL) 10 MG tablet Take 1 tablet by mouth Every 6 (Six) Hours As Needed for Moderate Pain . 16 tablet 0   • lithium carbonate 300 MG capsule Take  by mouth 2 (two) times a day. 900 mg po qam and 600 mg qpm  2   • nitroglycerin (NITROSTAT) 0.4 MG SL tablet PLACE 1 TABLET UNDER THE TONGUE EVERY 5 MINUTES UP TO 3 TABLETS IN 15 MINUTES FOR CHEST PAIN. IF NO RELIEF GO TO THE EMERGENCY ROOM OR CALL 25 tablet 1   • promethazine (PHENERGAN) 25 MG tablet Take 1 tablet by mouth Every 12  (Twelve) Hours As Needed for Nausea. 20 tablet 0   • ranolazine (RANEXA) 1000 MG 12 hr tablet Take 1,000 mg by mouth 2 (Two) Times a Day.     • sertraline (ZOLOFT) 100 MG tablet Take 200 mg by mouth Daily.  2   • sildenafil (VIAGRA) 100 MG tablet Take 1/4 TO 1 TABLET DAILY AS NEEDED 30 minutes prior TO intercourse     • tamsulosin (FLOMAX) 0.4 MG capsule 24 hr capsule Take 1 capsule by mouth Daily. 30 capsule 1   • [DISCONTINUED] Evolocumab (REPATHA) solution auto-injector SureClick injection Inject 1 mL under the skin into the appropriate area as directed Every 14 (Fourteen) Days. 2 pen 11     No current facility-administered medications on file prior to visit.       Beta adrenergic blockers, Calcium channel blockers, and Imdur [isosorbide dinitrate]    Past Medical History:   Diagnosis Date   • Abnormal ECG    • Alcohol liver damage (HCC)    • Anxiety    • Arrhythmia    • Atrial fibrillation (HCC)    • Benign hypertension    • Bipolar depression (HCC)    • CHF (congestive heart failure) (HCC)    • Cirrhosis of liver (HCC)    • Clotting disorder (HCC)    • Coronary artery disease    • DVT (deep venous thrombosis) (HCC)     Questionable history of deep venous thrombosis.   • Dyslipidemia    • Hyperlipidemia    • Myocardial infarction (HCC)     x 2   • Obesity    • JIM on CPAP     Obstructive sleep apnea, compliant with CPAP.    • Type 2 diabetes mellitus (HCC)    • Valvular disease        Social History     Socioeconomic History   • Marital status:    Tobacco Use   • Smoking status: Former Smoker     Packs/day: 1.00     Years: 15.00     Pack years: 15.00     Types: Cigarettes     Start date: 1985     Quit date:      Years since quittin.2   • Smokeless tobacco: Never Used   • Tobacco comment: Quit 12 years ago; smoked 17 years at 1 PPD   Substance and Sexual Activity   • Alcohol use: No     Comment: Quit 12 years ago; drank a fifth a day for 20 years   • Drug use: No   • Sexual activity: Yes      "Partners: Female     Birth control/protection: None       Family History   Problem Relation Age of Onset   • Heart disease Father         CAD, 25 year old   • Nephrolithiasis Father    • Heart attack Father    • Atrial fibrillation Mother    • Arrhythmia Mother    • No Known Problems Sister        Review of Systems   Constitutional: Positive for fatigue. Negative for chills and fever.   HENT: Negative.  Negative for congestion, rhinorrhea and sore throat.    Eyes: Positive for visual disturbance (glasses).   Respiratory: Positive for chest tightness and shortness of breath. Negative for wheezing.    Cardiovascular: Positive for chest pain, palpitations and leg swelling (right lg ).   Gastrointestinal: Negative.    Endocrine: Negative.    Genitourinary: Negative.    Musculoskeletal: Negative.  Negative for arthralgias, back pain and neck pain.   Skin: Negative.  Negative for rash and wound.   Allergic/Immunologic: Negative.  Negative for environmental allergies.   Neurological: Positive for dizziness. Negative for weakness, numbness and headaches.   Hematological: Negative.  Does not bruise/bleed easily.   Psychiatric/Behavioral: Negative.  Negative for sleep disturbance.       Objective   Vitals:    04/04/22 0935   BP: 119/77   BP Location: Left arm   Patient Position: Sitting   Pulse: 83   SpO2: 97%   Weight: (!) 137 kg (301 lb 12.8 oz)   Height: 182.9 cm (72\")      /77 (BP Location: Left arm, Patient Position: Sitting)   Pulse 83   Ht 182.9 cm (72\")   Wt (!) 137 kg (301 lb 12.8 oz)   SpO2 97%   BMI 40.93 kg/m²    Lab Results (most recent)     None        Physical Exam  Vitals and nursing note reviewed.   Constitutional:       General: He is not in acute distress.     Appearance: He is well-developed.   HENT:      Head: Normocephalic and atraumatic.   Eyes:      Conjunctiva/sclera: Conjunctivae normal.      Pupils: Pupils are equal, round, and reactive to light.   Neck:      Vascular: No JVD.      " Trachea: No tracheal deviation.   Cardiovascular:      Rate and Rhythm: Normal rate and regular rhythm.      Heart sounds: Normal heart sounds.   Pulmonary:      Effort: Pulmonary effort is normal.      Breath sounds: Normal breath sounds.   Abdominal:      General: Bowel sounds are normal. There is no distension.      Palpations: Abdomen is soft. There is no mass.      Tenderness: There is no abdominal tenderness. There is no guarding or rebound.   Musculoskeletal:         General: No tenderness or deformity. Normal range of motion.      Cervical back: Normal range of motion and neck supple.   Skin:     General: Skin is warm and dry.      Coloration: Skin is not pale.      Findings: No erythema or rash.   Neurological:      Mental Status: He is alert and oriented to person, place, and time.   Psychiatric:         Behavior: Behavior normal.         Thought Content: Thought content normal.         Judgment: Judgment normal.           Procedure   Procedures       Assessment/Plan      Diagnosis Plan   1. Coronary artery disease involving native coronary artery of native heart with angina pectoris (HCC)     2. Paroxysmal atrial fibrillation (HCC)     3. Palpitations     4. Shortness of breath       1.  The patient presents in for ER evaluation.  He recently presented to the ER because of increasing chest pain and also increasing palpitations.  He apparently was attempted with a treadmill stress test, but could not complete that study has he could not cooperate with treadmill protocol.  That was discontinued and he was advised to follow-up with us today to consider catheterization.  He had a catheterization just 6 months ago which indicated nonobstructive disease.  I do not feel he needs repeat ischemia assessment at this time.    2.  Alternately, the patient feels that all of his symptoms are directly related to A. fib.  He feels that with A. fib with rapid ventricular response rates, he has onset of chest pain,  increasing dyspnea, and issues otherwise.  I have offered consideration for an event monitor to confirm this.  He is so sure that he wants to forego evaluation of the event monitor.  I would go and start him on sotalol 80 mg 1 p.o. twice daily.  I will restart Eliquis at 5 mg 1 p.o. twice daily.  He will continue medications as is and as dosed as above.    3.  I would like to schedule for EKG daily x3 for QT/QTc interval, which will be scheduled next Monday, Tuesday, and Wednesday.  We can recommend him further once we can review results of echo and see response to medication change.    4.  Once therapeutic on Eliquis, likely would decrease aspirin from 325 mg daily to 81 mg daily.    5.  No further cardiac evaluation.  We will await response to institution of medications, in particular rhythm control medication, as above.  If symptoms are persistent after that, I would push the patient to consider an event monitor.                      Electronically signed by:

## 2022-04-11 ENCOUNTER — CLINICAL SUPPORT (OUTPATIENT)
Dept: CARDIOLOGY | Facility: CLINIC | Age: 52
End: 2022-04-11

## 2022-04-11 VITALS
WEIGHT: 302.2 LBS | OXYGEN SATURATION: 98 % | BODY MASS INDEX: 40.93 KG/M2 | HEIGHT: 72 IN | SYSTOLIC BLOOD PRESSURE: 123 MMHG | DIASTOLIC BLOOD PRESSURE: 74 MMHG | HEART RATE: 56 BPM

## 2022-04-11 DIAGNOSIS — I48.0 PAROXYSMAL ATRIAL FIBRILLATION: Primary | ICD-10-CM

## 2022-04-11 PROCEDURE — 93000 ELECTROCARDIOGRAM COMPLETE: CPT | Performed by: PHYSICIAN ASSISTANT

## 2022-04-11 NOTE — PROGRESS NOTES
Ricardo Crespo  1970 4/11/2022   ?   Chief Complaint   Patient presents with   • Atrial Fibrillation     Nurse visit for EKG, Sotalol therapy      ?   HPI:   ? Patient presents as nurse visit for EKG due to atrial fibrillation. He started Sotalol 80 BID with first dose Saturday evening, 4/9/22. He has experienced chest pressure but no palpitations or increase in shortness of breath. He reports heart rate was at 48 bpm last night. EKG done as ordered and to be reviewed by Grzegorz Daugherty PA-C. Lynn Rangel MA    ?   ?     Current Outpatient Medications:   •  apixaban (ELIQUIS) 5 MG tablet tablet, Take 1 tablet by mouth 2 (Two) Times a Day., Disp: 60 tablet, Rfl: 11  •  aspirin 325 MG tablet, Take 325 mg by mouth Every Morning., Disp: , Rfl:   •  furosemide (LASIX) 40 MG tablet, Take 40 mg by mouth 2 (Two) Times a Day., Disp: , Rfl:   •  Insulin Infusion Pump device, humalog, Disp: , Rfl:   •  ketorolac (TORADOL) 10 MG tablet, Take 1 tablet by mouth Every 6 (Six) Hours As Needed for Moderate Pain ., Disp: 16 tablet, Rfl: 0  •  lithium carbonate 300 MG capsule, Take  by mouth 2 (two) times a day. 900 mg po qam and 600 mg qpm, Disp: , Rfl: 2  •  nitroglycerin (NITROSTAT) 0.4 MG SL tablet, PLACE 1 TABLET UNDER THE TONGUE EVERY 5 MINUTES UP TO 3 TABLETS IN 15 MINUTES FOR CHEST PAIN. IF NO RELIEF GO TO THE EMERGENCY ROOM OR CALL, Disp: 25 tablet, Rfl: 1  •  promethazine (PHENERGAN) 25 MG tablet, Take 1 tablet by mouth Every 12 (Twelve) Hours As Needed for Nausea., Disp: 20 tablet, Rfl: 0  •  sertraline (ZOLOFT) 100 MG tablet, Take 200 mg by mouth Daily., Disp: , Rfl: 2  •  sildenafil (VIAGRA) 100 MG tablet, Take 1/4 TO 1 TABLET DAILY AS NEEDED 30 minutes prior TO intercourse, Disp: , Rfl:   •  Sotalol HCl AF 80 MG tablet, Take 1 tablet by mouth 2 (Two) Times a Day., Disp: 60 tablet, Rfl: 11  •  tamsulosin (FLOMAX) 0.4 MG capsule 24 hr capsule, Take 1 capsule by mouth Daily., Disp: 30 capsule, Rfl: 1   ?   ?    Beta adrenergic blockers, Calcium channel blockers, and Imdur [isosorbide dinitrate]         ECG 12 Lead    Date/Time: 4/11/2022 9:54 AM  Performed by: Grzegorz Daugherty PA  Authorized by: Grzegorz Daugherty PA   Comparison: compared with previous ECG from 9/14/2021               ?   Assessment/Plan    ?   ?   ?  1. Atrial Fibrillation    EKG, vitals and symptoms reviewed by Grzegorz Daugherty PA-C with verbal order to decrease Sotalol to 40 mg BID. Patient verbalized understanding and will keep nurse visits scheduled the next 2 days for EKG.  Lynn Rangel MA

## 2022-04-12 ENCOUNTER — CLINICAL SUPPORT (OUTPATIENT)
Dept: CARDIOLOGY | Facility: CLINIC | Age: 52
End: 2022-04-12

## 2022-04-12 VITALS
BODY MASS INDEX: 40.8 KG/M2 | WEIGHT: 301.2 LBS | OXYGEN SATURATION: 98 % | HEART RATE: 56 BPM | HEIGHT: 72 IN | DIASTOLIC BLOOD PRESSURE: 77 MMHG | SYSTOLIC BLOOD PRESSURE: 124 MMHG

## 2022-04-12 DIAGNOSIS — I48.0 PAROXYSMAL ATRIAL FIBRILLATION: Primary | ICD-10-CM

## 2022-04-12 PROCEDURE — 93000 ELECTROCARDIOGRAM COMPLETE: CPT | Performed by: PHYSICIAN ASSISTANT

## 2022-04-12 NOTE — PROGRESS NOTES
Ricardo Crespo  1970 4/12/2022   ?   No chief complaint on file.     ?   HPI:   ?   Patient presents as nurse visit for day #2 EKG due to atrial fibrillation. He started Sotalol 80 BID with first dose Saturday evening, 4/9/22 and decreased to 40 mg BID on 4/11/22 (evening). Patient is doing better with decrease. He denies palpitations but still has occasional chest pressure on exertion. EKG done as ordered and to be reviewed by Grzegorz Daugherty PA-C. Lynn Rangel MA    ?     Current Outpatient Medications:   •  apixaban (ELIQUIS) 5 MG tablet tablet, Take 1 tablet by mouth 2 (Two) Times a Day., Disp: 60 tablet, Rfl: 11  •  aspirin 325 MG tablet, Take 325 mg by mouth Every Morning., Disp: , Rfl:   •  furosemide (LASIX) 40 MG tablet, Take 40 mg by mouth 2 (Two) Times a Day., Disp: , Rfl:   •  Insulin Infusion Pump device, humalog, Disp: , Rfl:   •  ketorolac (TORADOL) 10 MG tablet, Take 1 tablet by mouth Every 6 (Six) Hours As Needed for Moderate Pain ., Disp: 16 tablet, Rfl: 0  •  lithium carbonate 300 MG capsule, Take  by mouth 2 (two) times a day. 900 mg po qam and 600 mg qpm, Disp: , Rfl: 2  •  nitroglycerin (NITROSTAT) 0.4 MG SL tablet, PLACE 1 TABLET UNDER THE TONGUE EVERY 5 MINUTES UP TO 3 TABLETS IN 15 MINUTES FOR CHEST PAIN. IF NO RELIEF GO TO THE EMERGENCY ROOM OR CALL, Disp: 25 tablet, Rfl: 1  •  promethazine (PHENERGAN) 25 MG tablet, Take 1 tablet by mouth Every 12 (Twelve) Hours As Needed for Nausea., Disp: 20 tablet, Rfl: 0  •  sertraline (ZOLOFT) 100 MG tablet, Take 200 mg by mouth Daily., Disp: , Rfl: 2  •  sildenafil (VIAGRA) 100 MG tablet, Take 1/4 TO 1 TABLET DAILY AS NEEDED 30 minutes prior TO intercourse, Disp: , Rfl:   •  Sotalol HCl AF 80 MG tablet, Take 0.5 tablets by mouth 2 (Two) Times a Day., Disp: 30 tablet, Rfl: 11  •  tamsulosin (FLOMAX) 0.4 MG capsule 24 hr capsule, Take 1 capsule by mouth Daily., Disp: 30 capsule, Rfl: 1   ?   ?   Beta adrenergic blockers, Calcium channel  blockers, and Imdur [isosorbide dinitrate]         ECG 12 Lead    Date/Time: 4/12/2022 8:03 AM  Performed by: Grzegorz Daugherty PA  Authorized by: Grzegorz Daugherty PA   Comparison: compared with previous ECG from 4/11/2022               ?   Assessment/Plan    ?   ?   ? 1. Atrial Fibrillation, Sotalol therapy    EKG, vitals and symptoms reviewed by Grzegorz Daugherty PA-C with no new orders at this time. Patient scheduled for day #3 EKG tomorrow. Lynn Rangel MA

## 2022-04-13 ENCOUNTER — CLINICAL SUPPORT (OUTPATIENT)
Dept: CARDIOLOGY | Facility: CLINIC | Age: 52
End: 2022-04-13

## 2022-04-13 VITALS
WEIGHT: 298.6 LBS | HEIGHT: 72 IN | OXYGEN SATURATION: 99 % | HEART RATE: 61 BPM | DIASTOLIC BLOOD PRESSURE: 77 MMHG | SYSTOLIC BLOOD PRESSURE: 133 MMHG | BODY MASS INDEX: 40.44 KG/M2

## 2022-04-13 DIAGNOSIS — I48.0 PAROXYSMAL ATRIAL FIBRILLATION: Primary | ICD-10-CM

## 2022-04-13 PROCEDURE — 93000 ELECTROCARDIOGRAM COMPLETE: CPT | Performed by: PHYSICIAN ASSISTANT

## 2022-04-13 NOTE — PROGRESS NOTES
Ricardo Crespo  1970 4/13/2022   ?   No chief complaint on file.     ?   HPI:   ?   Patient presents as nurse visit for day #3 EKG due to atrial fibrillation. He started Sotalol 80 BID with first dose Saturday evening, 4/9/22 and decreased to 40 mg BID on 4/11/22 (evening). Patient is doing better with decrease. He experienced some dizziness last night but no other complaints. EKG done as ordered and to be reviewed by Grzegorz Daugherty PA-C. Lynn Rangel MA?   ?     Current Outpatient Medications:   •  apixaban (ELIQUIS) 5 MG tablet tablet, Take 1 tablet by mouth 2 (Two) Times a Day., Disp: 60 tablet, Rfl: 11  •  aspirin 325 MG tablet, Take 325 mg by mouth Every Morning., Disp: , Rfl:   •  furosemide (LASIX) 40 MG tablet, Take 40 mg by mouth 2 (Two) Times a Day., Disp: , Rfl:   •  Insulin Infusion Pump device, humalog, Disp: , Rfl:   •  ketorolac (TORADOL) 10 MG tablet, Take 1 tablet by mouth Every 6 (Six) Hours As Needed for Moderate Pain ., Disp: 16 tablet, Rfl: 0  •  lithium carbonate 300 MG capsule, Take  by mouth 2 (two) times a day. 900 mg po qam and 600 mg qpm, Disp: , Rfl: 2  •  nitroglycerin (NITROSTAT) 0.4 MG SL tablet, PLACE 1 TABLET UNDER THE TONGUE EVERY 5 MINUTES UP TO 3 TABLETS IN 15 MINUTES FOR CHEST PAIN. IF NO RELIEF GO TO THE EMERGENCY ROOM OR CALL, Disp: 25 tablet, Rfl: 1  •  promethazine (PHENERGAN) 25 MG tablet, Take 1 tablet by mouth Every 12 (Twelve) Hours As Needed for Nausea., Disp: 20 tablet, Rfl: 0  •  sertraline (ZOLOFT) 100 MG tablet, Take 200 mg by mouth Daily., Disp: , Rfl: 2  •  sildenafil (VIAGRA) 100 MG tablet, Take 1/4 TO 1 TABLET DAILY AS NEEDED 30 minutes prior TO intercourse, Disp: , Rfl:   •  Sotalol HCl AF 80 MG tablet, Take 0.5 tablets by mouth 2 (Two) Times a Day., Disp: 30 tablet, Rfl: 11  •  tamsulosin (FLOMAX) 0.4 MG capsule 24 hr capsule, Take 1 capsule by mouth Daily., Disp: 30 capsule, Rfl: 1   ?   ?   Beta adrenergic blockers, Calcium channel blockers, and  Imdur [isosorbide dinitrate]         ECG 12 Lead    Date/Time: 4/13/2022 8:08 AM  Performed by: Grzegorz Daugherty PA  Authorized by: Grzegorz Daugherty PA   Comparison: compared with previous ECG from 4/12/2022               ?   Assessment/Plan    ?   ?   ?  1. Atrial Fibrillation, Sotalol therapy     EKG, vitals and symptoms reviewed by Grzegorz Daugherty PA-C with verbal order for EKG in 2 weeks. Nurse visit scheduled 4/27 and follow up scheduled on 5/17/22. He will call with any concerns. Lynn Rangel MA

## 2022-04-27 ENCOUNTER — TELEPHONE (OUTPATIENT)
Dept: CARDIOLOGY | Facility: CLINIC | Age: 52
End: 2022-04-27

## 2022-04-27 ENCOUNTER — CLINICAL SUPPORT (OUTPATIENT)
Dept: CARDIOLOGY | Facility: CLINIC | Age: 52
End: 2022-04-27

## 2022-04-27 DIAGNOSIS — R07.2 PRECORDIAL PAIN: Primary | ICD-10-CM

## 2022-04-27 PROCEDURE — 93000 ELECTROCARDIOGRAM COMPLETE: CPT | Performed by: PHYSICIAN ASSISTANT

## 2022-04-27 NOTE — TELEPHONE ENCOUNTER
----- Message from Alicia Hull MA sent at 4/27/2022 10:58 AM EDT -----  Regarding: FW: Sotalol  I have been talking with the patient . He was supposed to have a nurse visit today but I do not see that he came. He is a [facility] patient.   ----- Message -----  From: Ricardo Crespo  Sent: 4/26/2022   2:34 PM EDT  To: [facility]  Subject: Sotalol                                          i dont have a blood pressure monitor.

## 2022-04-27 NOTE — PROGRESS NOTES
Ricardo Crespo  1970 4/27/2022   ?   No chief complaint on file.     ?   HPI: EKG, vitals and symptoms reviewed by Grzegorz Daugherty PA-C with verbal order for EKG in 2 weeks. Nurse visit scheduled 4/27 .  Started Sotalol 04/11     Pt states he is feeling very tired x 4 days, states he thinks the Sotalol is causing these symptoms. Has  SOB x 3 days with any exertion, does has some sob but notices more severe the last few days.     Patient states he had to take 6 nitros due to chest pain, center of chest/pain in left side of jaw. Patient started having pain in chest, while  ?   ?   ?     Current Outpatient Medications:   •  apixaban (ELIQUIS) 5 MG tablet tablet, Take 1 tablet by mouth 2 (Two) Times a Day., Disp: 60 tablet, Rfl: 11  •  aspirin 325 MG tablet, Take 325 mg by mouth Every Morning., Disp: , Rfl:   •  furosemide (LASIX) 40 MG tablet, Take 40 mg by mouth 2 (Two) Times a Day., Disp: , Rfl:   •  Insulin Infusion Pump device, humalog, Disp: , Rfl:   •  ketorolac (TORADOL) 10 MG tablet, Take 1 tablet by mouth Every 6 (Six) Hours As Needed for Moderate Pain ., Disp: 16 tablet, Rfl: 0  •  lithium carbonate 300 MG capsule, Take  by mouth 2 (two) times a day. 900 mg po qam and 600 mg qpm, Disp: , Rfl: 2  •  nitroglycerin (NITROSTAT) 0.4 MG SL tablet, PLACE 1 TABLET UNDER THE TONGUE EVERY 5 MINUTES UP TO 3 TABLETS IN 15 MINUTES FOR CHEST PAIN. IF NO RELIEF GO TO THE EMERGENCY ROOM OR CALL, Disp: 25 tablet, Rfl: 1  •  promethazine (PHENERGAN) 25 MG tablet, Take 1 tablet by mouth Every 12 (Twelve) Hours As Needed for Nausea., Disp: 20 tablet, Rfl: 0  •  sertraline (ZOLOFT) 100 MG tablet, Take 200 mg by mouth Daily., Disp: , Rfl: 2  •  sildenafil (VIAGRA) 100 MG tablet, Take 1/4 TO 1 TABLET DAILY AS NEEDED 30 minutes prior TO intercourse, Disp: , Rfl:   •  Sotalol HCl AF 80 MG tablet, Take 0.5 tablets by mouth 2 (Two) Times a Day., Disp: 30 tablet, Rfl: 11  •  tamsulosin (FLOMAX) 0.4 MG capsule 24 hr capsule, Take  1 capsule by mouth Daily., Disp: 30 capsule, Rfl: 1   ?   ?   Beta adrenergic blockers, Calcium channel blockers, and Imdur [isosorbide dinitrate]         ECG 12 Lead    Date/Time: 4/27/2022 9:00 AM  Performed by: Grzegorz Daugherty PA  Authorized by: Grzegorz Daugherty PA   Comparison: compared with previous ECG from 4/13/2022             EKG performed and reviewed by DEMETRIUS Terrell    ?   Assessment/Plan    Patient started having active chest pain, pain in the left side of Jaw.   Per DEMETRIUS Terrell   Stop Sotalol   Patient is directed to go to ER for further evaluation, was not able to obtain further evaluation for pt while in office..  Stop sotalol. EKG has been faxed to ER.    ?   ?

## 2022-05-01 ENCOUNTER — OUTSIDE FACILITY SERVICE (OUTPATIENT)
Dept: CARDIOLOGY | Facility: CLINIC | Age: 52
End: 2022-05-01

## 2022-05-01 PROCEDURE — 99232 SBSQ HOSP IP/OBS MODERATE 35: CPT | Performed by: INTERNAL MEDICINE

## 2022-05-16 ENCOUNTER — OFFICE VISIT (OUTPATIENT)
Dept: CARDIOLOGY | Facility: CLINIC | Age: 52
End: 2022-05-16

## 2022-05-16 VITALS
BODY MASS INDEX: 40.61 KG/M2 | SYSTOLIC BLOOD PRESSURE: 116 MMHG | WEIGHT: 299.8 LBS | OXYGEN SATURATION: 98 % | HEART RATE: 93 BPM | HEIGHT: 72 IN | DIASTOLIC BLOOD PRESSURE: 82 MMHG

## 2022-05-16 DIAGNOSIS — I48.0 PAROXYSMAL ATRIAL FIBRILLATION: ICD-10-CM

## 2022-05-16 DIAGNOSIS — R06.02 SHORTNESS OF BREATH: ICD-10-CM

## 2022-05-16 DIAGNOSIS — R00.2 PALPITATIONS: ICD-10-CM

## 2022-05-16 DIAGNOSIS — I25.119 CORONARY ARTERY DISEASE INVOLVING NATIVE CORONARY ARTERY OF NATIVE HEART WITH ANGINA PECTORIS: Primary | ICD-10-CM

## 2022-05-16 PROCEDURE — 99213 OFFICE O/P EST LOW 20 MIN: CPT | Performed by: PHYSICIAN ASSISTANT

## 2022-05-16 RX ORDER — CLOPIDOGREL BISULFATE 75 MG/1
75 TABLET ORAL DAILY
COMMUNITY
End: 2022-10-17

## 2022-05-16 NOTE — PROGRESS NOTES
Problem list     Subjective   Ricardo Crespo is a 51 y.o. male     Chief Complaint   Patient presents with   • Follow-up     Cath - done by Maye - records have been requested - stents placed    • Coronary Artery Disease   PROBLEM LIST:      1. Coronary artery disease  1.1. Stenting of the LAD, 11/2015, per Dr. Moss.  1.2. Repeat catheterization 2-3 weeks after stenting as above, indicating patent stent with 40% PDA stenosis. Medical management was recommended.  1.3. Repeat catheterization, 01/2016, indicating nonobstructive disease with patent stent. Medical management was recommended.  1.4.  Stenting of the PDA, 03/2017, in the setting of low-level symptoms and abnormal stress test.  Previous stent to the LAD was patent.  The patient had nonobstructive disease otherwise.  Medical management was recommended.  1.5.  Stenting of the mid RCA and PTCA only of the distal LAD, 09/17.  1.6.  Repeat LHC, 11/17, in the setting of acute chest pain.  The patient had 50% LAD stenosis, patent stents, and non-obstructive CAD otherwise.  1. 7 repeat left heart catheter March 2018 with stenting of the LAD ×2.  Nonobstructive disease otherwise and patent stent with medical management recommended  1.8 left heart cath 10/16/18-30-40% LAD, 40% apical branch, 10% circumflex left, right coronary artery 10%, EF 60%  1.9 LHC, 2-8-19, EF 55-60%, demonstrated widely patent epicardial coronary arteries with diffuse non-obstructive disease with empiric therapy for ischemia and risk factor manag.  1.10 Repeat left heart catheterization, 9/2021, in the setting of low level symptoms compatible with angina/ischemia.  The patient had small vessel disease with nonobstructive disease noted otherwise.  No targets for intervention were noted.  Medical management was recommended.  Previously placed stents were patent.  1.11 Repeat heart catheterization, 4/2022 with eventual PTCA of the second diagonal of the LAD and stenting of the recurrent  apical branch of the LAD.  His previous catheterization had demonstrated the segments of disease, but because of persistent chest pain he eventually had to be returned for intervention.  2. Preserved systolic function  3. Hypertension  4. Dyslipidemia  5. Diabetes mellitus  6. Sleep apnea  7. Reported history of DVT   8. Atrial Fibrillation      HPI  The patient presents back to the clinic today for routine evaluation and follow-up.  He had catheterization demonstrating severe small vessel LAD disease.  Medical management was recommended but he had persistent chest pain and was returned with Dr. Bee and had stenting of the distal LAD and PTCA only of the diagonal.  He tells me today that he feels much improved.  Still the majority of his symptoms occur with A. fib.  He reports frequent episodes of tachypalpitations with associated chest tightness, shortness of air, and fatigue.  We had attempted sotalol and even Multaq to treat this historically, the patient could not tolerate this.  He has failed numerous other traditional/conventional medications otherwise.  He would like consideration for EP evaluation for persistent symptoms.  The patient has no further complaints otherwise at this time.    Current Outpatient Medications on File Prior to Visit   Medication Sig Dispense Refill   • apixaban (ELIQUIS) 5 MG tablet tablet Take 1 tablet by mouth 2 (Two) Times a Day. 60 tablet 11   • clopidogrel (PLAVIX) 75 MG tablet Take 75 mg by mouth Daily.     • furosemide (LASIX) 40 MG tablet Take 40 mg by mouth 2 (Two) Times a Day.     • Insulin Infusion Pump device humalog     • ketorolac (TORADOL) 10 MG tablet Take 1 tablet by mouth Every 6 (Six) Hours As Needed for Moderate Pain . 16 tablet 0   • lithium carbonate 300 MG capsule Take  by mouth 2 (two) times a day. 900 mg po qam and 600 mg qpm  2   • nitroglycerin (NITROSTAT) 0.4 MG SL tablet PLACE 1 TABLET UNDER THE TONGUE EVERY 5 MINUTES UP TO 3 TABLETS IN 15 MINUTES FOR  CHEST PAIN. IF NO RELIEF GO TO THE EMERGENCY ROOM OR CALL 25 tablet 1   • promethazine (PHENERGAN) 25 MG tablet Take 1 tablet by mouth Every 12 (Twelve) Hours As Needed for Nausea. 20 tablet 0   • sertraline (ZOLOFT) 100 MG tablet Take 200 mg by mouth Daily.  2   • sildenafil (VIAGRA) 100 MG tablet Take 1/4 TO 1 TABLET DAILY AS NEEDED 30 minutes prior TO intercourse     • tamsulosin (FLOMAX) 0.4 MG capsule 24 hr capsule Take 1 capsule by mouth Daily. 30 capsule 1     No current facility-administered medications on file prior to visit.       Beta adrenergic blockers, Calcium channel blockers, and Imdur [isosorbide dinitrate]    Past Medical History:   Diagnosis Date   • Abnormal ECG    • Alcohol liver damage (HCC)    • Anxiety    • Arrhythmia    • Atrial fibrillation (HCC)    • Benign hypertension    • Bipolar depression (HCC)    • CHF (congestive heart failure) (HCC)    • Cirrhosis of liver (HCC)    • Clotting disorder (HCC)    • Coronary artery disease    • DVT (deep venous thrombosis) (HCC)     Questionable history of deep venous thrombosis.   • Dyslipidemia    • Hyperlipidemia    • Myocardial infarction (HCC)     x 2   • Obesity    • JIM on CPAP     Obstructive sleep apnea, compliant with CPAP.    • Type 2 diabetes mellitus (HCC)    • Valvular disease        Social History     Socioeconomic History   • Marital status:    Tobacco Use   • Smoking status: Former Smoker     Packs/day: 1.00     Years: 15.00     Pack years: 15.00     Types: Cigarettes     Start date: 1985     Quit date:      Years since quittin.4   • Smokeless tobacco: Never Used   • Tobacco comment: Quit 12 years ago; smoked 17 years at 1 PPD   Substance and Sexual Activity   • Alcohol use: No     Comment: Quit 12 years ago; drank a fifth a day for 20 years   • Drug use: No   • Sexual activity: Yes     Partners: Female     Birth control/protection: None       Family History   Problem Relation Age of Onset   • Heart disease Father   "       CAD, 25 year old   • Nephrolithiasis Father    • Heart attack Father    • Atrial fibrillation Mother    • Arrhythmia Mother    • No Known Problems Sister        Review of Systems   Constitutional: Positive for fatigue. Negative for chills and fever.   HENT: Negative.  Negative for congestion, rhinorrhea and sore throat.    Eyes: Positive for visual disturbance (glasses).   Respiratory: Positive for shortness of breath. Negative for chest tightness.    Cardiovascular: Negative.  Negative for chest pain, palpitations and leg swelling.   Gastrointestinal: Negative.    Endocrine: Negative.    Genitourinary: Negative.    Musculoskeletal: Negative.  Negative for arthralgias, back pain and neck pain.   Skin: Negative.  Negative for rash and wound.   Allergic/Immunologic: Negative.  Negative for environmental allergies.   Neurological: Negative.  Negative for dizziness, weakness, numbness and headaches.   Hematological: Bruises/bleeds easily (bruises/ bleeds).   Psychiatric/Behavioral: Negative.  Negative for sleep disturbance.       Objective   Vitals:    05/16/22 1605   BP: 116/82   BP Location: Left arm   Patient Position: Sitting   Pulse: 93   SpO2: 98%   Weight: 136 kg (299 lb 12.8 oz)   Height: 182.9 cm (72.01\")      /82 (BP Location: Left arm, Patient Position: Sitting)   Pulse 93   Ht 182.9 cm (72.01\")   Wt 136 kg (299 lb 12.8 oz)   SpO2 98%   BMI 40.65 kg/m²    Lab Results (most recent)     None        Physical Exam  Vitals and nursing note reviewed.   Constitutional:       General: He is not in acute distress.     Appearance: He is well-developed.   HENT:      Head: Normocephalic and atraumatic.   Eyes:      Conjunctiva/sclera: Conjunctivae normal.      Pupils: Pupils are equal, round, and reactive to light.   Neck:      Vascular: No JVD.      Trachea: No tracheal deviation.   Cardiovascular:      Rate and Rhythm: Normal rate and regular rhythm.      Heart sounds: Normal heart sounds. "   Pulmonary:      Effort: Pulmonary effort is normal.      Breath sounds: Normal breath sounds.   Abdominal:      General: Bowel sounds are normal. There is no distension.      Palpations: Abdomen is soft. There is no mass.      Tenderness: There is no abdominal tenderness. There is no guarding or rebound.   Musculoskeletal:         General: No tenderness or deformity. Normal range of motion.      Cervical back: Normal range of motion and neck supple.   Skin:     General: Skin is warm and dry.      Coloration: Skin is not pale.      Findings: No erythema or rash.   Neurological:      Mental Status: He is alert and oriented to person, place, and time.   Psychiatric:         Behavior: Behavior normal.         Thought Content: Thought content normal.         Judgment: Judgment normal.           Procedure   Procedures       Assessment & Plan      Diagnosis Plan   1. Coronary artery disease involving native coronary artery of native heart with angina pectoris (HCC)     2. Palpitations     3. Shortness of breath       1.  The patient is much improved status post recent stenting and PTCA of the diagonal.  Now his biggest issue is with what he feels is A. fib.  He has palpitations/tachycardia with associated mild chest tightness, dyspnea, and symptoms otherwise.  He would like consideration for referral to EP services.  I feel this is appropriate, but I would like to schedule for an event monitor first.  We can evaluate for rate or rhythm disturbances and recommended further at that time.    2.  For now, we will continue medications without change.    3.  He will monitor clinical course and called us for any issues.    4.  With overall improvement post recent intervention, we will continue to monitor the patient closely.  Further pending results of event monitor.                      Electronically signed by:

## 2022-05-26 ENCOUNTER — TELEPHONE (OUTPATIENT)
Dept: CARDIOLOGY | Facility: CLINIC | Age: 52
End: 2022-05-26

## 2022-05-26 NOTE — TELEPHONE ENCOUNTER
Spoke to patient on wearing monitor before being sent to EP.      Patient verbalized understanding     AT [facility]

## 2022-06-10 NOTE — H&P
Patient cleared for cataract surgery, [place] Focal Only. Pre-Cardiac Catheterization Report  Cardiovascular Laboratory  [facility]      Patient:  Ricardo Crespo  :  1970  PCP:  Rex Bush MD  PHONE:  [phone]    DATE: 2021    BRIEF HPI:  Ricardo Crespo is a 51 y.o. male with hypertension, hypercholesterolemia, diabetes mellitus, former tobacco abuse, obesity, family history of coronary artery disease, history of DVT, and known coronary artery disease.  He is post previous coronary stents.  He is complaining of a several month history of chest pain which he describes as a tightness.  He states it is moderate to severe in nature lasting minutes with associated shortness of breath, dyspnea on exertion, and fatigue.  His symptoms increased with any exertion and are relieved with rest and sublingual nitroglycerin.  He was recently evaluated by his primary cardiologist and now presents for left heart catheterization with possible intervention.    Cardiac Risk Factors:  diabetes mellitus, dyslipidemia, family history of premature cardiovascular disease, hypertension, male gender, obesity (BMI >= 30 kg/m2), smoking/ tobacco exposure    Anginal class in last 2 weeks:  CCS class III    CHF Class in last 2 weeks:  NYHA Class II    Cardiogenic shock:  no    Cardiac arrest <24 hours:  no    Stress test within last 6 months:   no   Details:    Previous cardiac catheterization:  yes  Details:     Previous CABG:  no  Details:      Allergies:     IV contrast allergy:  no  Allergies   Allergen Reactions   • Beta Adrenergic Blockers Other (See Comments)     profound blood pressure drops.   • Calcium Channel Blockers Other (See Comments)     profound blood pressure drops.   • Imdur [Isosorbide Dinitrate] Other (See Comments)     Headaches  Patient states his blood pressure drops profoundly?       MEDICATIONS:  Prior to Admission medications    Medication Sig Start Date End Date Taking? Authorizing Provider   aspirin 325 MG tablet Take 325 mg  by mouth Every Morning.   Yes Roger Bhatia MD   furosemide (LASIX) 40 MG tablet Take 40 mg by mouth 2 (Two) Times a Day.   Yes Roger Bhatia MD   Insulin Infusion Pump device humalog   Yes Roger Bhatia MD   lithium carbonate 300 MG capsule Take  by mouth 2 (two) times a day. 900 mg po qam and 600 mg qpm 12/27/16  Yes Roger Bhatia MD   nitroglycerin (NITROSTAT) 0.4 MG SL tablet  4/30/21  Yes Roger Bhatia MD   ranolazine (RANEXA) 1000 MG 12 hr tablet Take 1,000 mg by mouth 2 (Two) Times a Day.   Yes Roger Bhatia MD   sertraline (ZOLOFT) 100 MG tablet Take 200 mg by mouth Daily. 12/27/16  Yes Roger Bhatia MD   sildenafil (VIAGRA) 100 MG tablet Take 1/4 TO 1 TABLET DAILY AS NEEDED 30 minutes prior TO intercourse 4/23/21  Yes Roger Bhatia MD   clopidogrel (PLAVIX) 75 MG tablet Take 1 tablet by mouth Daily. 9/16/21   Grzegorz Daugherty PA   Evolocumab (REPATHA) solution auto-injector SureClick injection Inject 1 mL under the skin into the appropriate area as directed Every 14 (Fourteen) Days. 9/16/21   Grzegorz Daugherty PA   insulin lispro (humaLOG, ADMELOG) 100 UNIT/ML injection Inject 2-12 Units under the skin into the appropriate area as directed 3 (Three) Times a Day Before Meals.  9/24/21  Roger Bhatia MD   lithium carbonate 300 MG capsule Take 600 mg by mouth Every Night.  9/24/21  Roger Bhatia MD       Past medical & surgical history, social and family history reviewed in the electronic medical record.    ROS:  Cardiovascular ROS: positive for - chest pain, dyspnea on exertion, palpitations and shortness of breath    Physical Exam:    Vitals: There were no vitals filed for this visit. There were no vitals filed for this visit.    General Appearance:    Alert, cooperative, in no acute distress   Head:    Normocephalic, without obvious abnormality, atraumatic   Eyes:            Lids and lashes normal, conjunctivae and sclerae  normal, no   icterus, no pallor, corneas clear, PERRLA   Ears:    Ears appear intact with no abnormalities noted   Neck:   No adenopathy, supple, trachea midline, no thyromegaly, no   carotid bruit, no JVD   Back:     No kyphosis present, no scoliosis present, range of motion normal   Lungs:     Clear to auscultation,respirations regular, even and                unlabored    Heart:    Regular rhythm and normal rate, normal S1 and S2, no         murmur, no gallop, no rub, no click   Chest Wall:    No abnormalities observed   Abdomen:     Normal bowel sounds, no masses, no organomegaly, soft     nontender, nondistended, no guarding, no rebound                tenderness   Rectal:     Deferred   Extremities:   Moves all extremities well, no edema, no cyanosis, no           redness   Pulses:   Pulses palpable and equal bilaterally   Skin:   No bleeding, bruising or rash   Neurologic:   Cranial nerves 2 - 12 grossly intact, sensation intact     Barbaeu Test:  Left: Normal  (oxymetric [place]) Right: Not Assessed             Lab Results   Component Value Date    CHLPL 166 01/08/2016    TRIG 194 (H) 01/28/2021    HDL 34 (L) 01/28/2021    AST 32 01/27/2021    ALT 28 01/27/2021           Impression      · Unstable angina    Plan     · Procedure to perform: [facility]  · Planned access: Left radial artery              CARINA Duran   09/24/21  07:23 EDT

## 2022-08-23 NOTE — NUR
2300-WENT TO GIVE PATIENT HIS PERCOCET AND CHECK OUT HIS IV. HE SAID HIS HAND
WAS SWOLLEN. I DIDN'T NOTE ANY SWELLING BUT HIS IV WAS SORE UPON FLUSHING. I
TOLD HIM WE WOULD GET HIM ANOTHER ONE. I ATTEMPTED TO STICK PATIENT AND HE
SAID IT HURT TOO BAD TO LET SOMEONE ELSE TRY IT. THEN HE STARTED CUSSING ABOUT
HIS TELE MONITOR BEING OFF WHICH I WASN'T AWARE OF. I NOTIFIED HOUSE
SUPERVISOR TO COME TALK TO PATIENT AND ASKED SHANNAN BROWN TO START ANOTHER IV
ON PATIENT FOR ME. I NOTIFIED TELE WHO SAID THAT THEY HAD BEEN TOLD THAT
PATIENT WAS NON COMPLIANT AND THAT HE HAD KEPT PULLING IT OFF MULTIPLE TIMES
DURING DAYSHIFT AND HAD THREW IT AT THE TECH ON DAYSHIFT. SHANNAN BROWN WENT TO
START IV FOR ME AND SHE SAID PATIENT STARTED CUSSING HER AS SOON AS SHE
ENTERED THE ROOM AS WELL. HOUSE SUPERVISOR WENT TO SPEAK TO PATIENT. SEE HOUSE
SUPERVISORS NOTE. Private car

## 2022-09-05 ENCOUNTER — HOSPITAL ENCOUNTER (EMERGENCY)
Dept: HOSPITAL 79 - ER1 | Age: 52
Discharge: HOME | End: 2022-09-05
Payer: COMMERCIAL

## 2022-09-05 VITALS — BODY MASS INDEX: 39.28 KG/M2 | WEIGHT: 290 LBS | HEIGHT: 72 IN

## 2022-09-05 DIAGNOSIS — Z90.49: ICD-10-CM

## 2022-09-05 DIAGNOSIS — I25.2: ICD-10-CM

## 2022-09-05 DIAGNOSIS — Z95.5: ICD-10-CM

## 2022-09-05 DIAGNOSIS — Z87.891: ICD-10-CM

## 2022-09-05 DIAGNOSIS — I10: ICD-10-CM

## 2022-09-05 DIAGNOSIS — Z86.718: ICD-10-CM

## 2022-09-05 DIAGNOSIS — K74.60: ICD-10-CM

## 2022-09-05 DIAGNOSIS — Z88.8: ICD-10-CM

## 2022-09-05 DIAGNOSIS — I48.0: ICD-10-CM

## 2022-09-05 DIAGNOSIS — Z79.01: ICD-10-CM

## 2022-09-05 DIAGNOSIS — I25.10: Primary | ICD-10-CM

## 2022-09-05 DIAGNOSIS — E78.5: ICD-10-CM

## 2022-09-05 DIAGNOSIS — Z79.82: ICD-10-CM

## 2022-09-05 DIAGNOSIS — G47.33: ICD-10-CM

## 2022-09-05 DIAGNOSIS — E11.9: ICD-10-CM

## 2022-09-05 DIAGNOSIS — Z79.899: ICD-10-CM

## 2022-09-05 DIAGNOSIS — Z96.41: ICD-10-CM

## 2022-09-05 LAB
BUN/CREATININE RATIO: 10 (ref 0–10)
HGB BLD-MCNC: 12.8 GM/DL (ref 14–17.5)
RED BLOOD COUNT: 4.73 M/UL (ref 4.2–5.5)
WHITE BLOOD COUNT: 8.5 K/UL (ref 4.5–11)

## 2022-09-14 DIAGNOSIS — R07.2 PRECORDIAL PAIN: ICD-10-CM

## 2022-09-14 RX ORDER — NITROGLYCERIN 0.4 MG/1
TABLET SUBLINGUAL
Qty: 25 TABLET | Refills: 3 | Status: SHIPPED | OUTPATIENT
Start: 2022-09-14

## 2022-09-22 ENCOUNTER — APPOINTMENT (OUTPATIENT)
Dept: GENERAL RADIOLOGY | Facility: HOSPITAL | Age: 52
End: 2022-09-22

## 2022-09-22 ENCOUNTER — APPOINTMENT (OUTPATIENT)
Dept: CT IMAGING | Facility: HOSPITAL | Age: 52
End: 2022-09-22

## 2022-09-22 ENCOUNTER — HOSPITAL ENCOUNTER (EMERGENCY)
Facility: HOSPITAL | Age: 52
Discharge: LEFT BEFORE DISCHARGE | End: 2022-09-22
Attending: STUDENT IN AN ORGANIZED HEALTH CARE EDUCATION/TRAINING PROGRAM | Admitting: STUDENT IN AN ORGANIZED HEALTH CARE EDUCATION/TRAINING PROGRAM

## 2022-09-22 VITALS
BODY MASS INDEX: 38.6 KG/M2 | HEART RATE: 65 BPM | TEMPERATURE: 98.7 F | WEIGHT: 285 LBS | OXYGEN SATURATION: 95 % | DIASTOLIC BLOOD PRESSURE: 79 MMHG | HEIGHT: 72 IN | RESPIRATION RATE: 18 BRPM | SYSTOLIC BLOOD PRESSURE: 121 MMHG

## 2022-09-22 DIAGNOSIS — R07.9 ACUTE CHEST PAIN: ICD-10-CM

## 2022-09-22 DIAGNOSIS — R29.90 STROKE-LIKE SYMPTOMS: Primary | ICD-10-CM

## 2022-09-22 LAB
ABO GROUP BLD: NORMAL
ABO GROUP BLD: NORMAL
ALBUMIN SERPL-MCNC: 4 G/DL (ref 3.5–5.2)
ALBUMIN/GLOB SERPL: 1.3 G/DL
ALP SERPL-CCNC: 78 U/L (ref 39–117)
ALT SERPL W P-5'-P-CCNC: 26 U/L (ref 1–41)
ANION GAP SERPL CALCULATED.3IONS-SCNC: 13.3 MMOL/L (ref 5–15)
APTT PPP: 31.7 SECONDS (ref 26.5–34.5)
AST SERPL-CCNC: 42 U/L (ref 1–40)
BASOPHILS # BLD AUTO: 0.11 10*3/MM3 (ref 0–0.2)
BASOPHILS NFR BLD AUTO: 1.3 % (ref 0–1.5)
BILIRUB SERPL-MCNC: 0.8 MG/DL (ref 0–1.2)
BLD GP AB SCN SERPL QL: NEGATIVE
BUN SERPL-MCNC: 11 MG/DL (ref 6–20)
BUN/CREAT SERPL: 11.2 (ref 7–25)
CALCIUM SPEC-SCNC: 8.3 MG/DL (ref 8.6–10.5)
CHLORIDE SERPL-SCNC: 101 MMOL/L (ref 98–107)
CO2 SERPL-SCNC: 25.7 MMOL/L (ref 22–29)
CREAT SERPL-MCNC: 0.98 MG/DL (ref 0.76–1.27)
DEPRECATED RDW RBC AUTO: 41.5 FL (ref 37–54)
EGFRCR SERPLBLD CKD-EPI 2021: 91.6 ML/MIN/1.73
EOSINOPHIL # BLD AUTO: 0.31 10*3/MM3 (ref 0–0.4)
EOSINOPHIL NFR BLD AUTO: 3.6 % (ref 0.3–6.2)
ERYTHROCYTE [DISTWIDTH] IN BLOOD BY AUTOMATED COUNT: 14.3 % (ref 12.3–15.4)
GLOBULIN UR ELPH-MCNC: 3.2 GM/DL
GLUCOSE BLDC GLUCOMTR-MCNC: 245 MG/DL (ref 70–130)
GLUCOSE SERPL-MCNC: 174 MG/DL (ref 65–99)
HCT VFR BLD AUTO: 38.7 % (ref 37.5–51)
HGB BLD-MCNC: 12.9 G/DL (ref 13–17.7)
HOLD SPECIMEN: NORMAL
HOLD SPECIMEN: NORMAL
IMM GRANULOCYTES # BLD AUTO: 0.02 10*3/MM3 (ref 0–0.05)
IMM GRANULOCYTES NFR BLD AUTO: 0.2 % (ref 0–0.5)
INR PPP: 1.14 (ref 0.9–1.1)
LYMPHOCYTES # BLD AUTO: 1.31 10*3/MM3 (ref 0.7–3.1)
LYMPHOCYTES NFR BLD AUTO: 15.2 % (ref 19.6–45.3)
MCH RBC QN AUTO: 26.9 PG (ref 26.6–33)
MCHC RBC AUTO-ENTMCNC: 33.3 G/DL (ref 31.5–35.7)
MCV RBC AUTO: 80.6 FL (ref 79–97)
MONOCYTES # BLD AUTO: 0.55 10*3/MM3 (ref 0.1–0.9)
MONOCYTES NFR BLD AUTO: 6.4 % (ref 5–12)
NEUTROPHILS NFR BLD AUTO: 6.33 10*3/MM3 (ref 1.7–7)
NEUTROPHILS NFR BLD AUTO: 73.3 % (ref 42.7–76)
NRBC BLD AUTO-RTO: 0 /100 WBC (ref 0–0.2)
PLATELET # BLD AUTO: 111 10*3/MM3 (ref 140–450)
PMV BLD AUTO: 10.5 FL (ref 6–12)
POTASSIUM SERPL-SCNC: 3.1 MMOL/L (ref 3.5–5.2)
PROT SERPL-MCNC: 7.2 G/DL (ref 6–8.5)
PROTHROMBIN TIME: 14.9 SECONDS (ref 12.1–14.7)
QT INTERVAL: 468 MS
QTC INTERVAL: 515 MS
RBC # BLD AUTO: 4.8 10*6/MM3 (ref 4.14–5.8)
RH BLD: POSITIVE
RH BLD: POSITIVE
SODIUM SERPL-SCNC: 140 MMOL/L (ref 136–145)
T&S EXPIRATION DATE: NORMAL
TROPONIN T SERPL-MCNC: <0.01 NG/ML (ref 0–0.03)
WBC NRBC COR # BLD: 8.63 10*3/MM3 (ref 3.4–10.8)
WHOLE BLOOD HOLD COAG: NORMAL
WHOLE BLOOD HOLD SPECIMEN: NORMAL

## 2022-09-22 PROCEDURE — 84484 ASSAY OF TROPONIN QUANT: CPT | Performed by: STUDENT IN AN ORGANIZED HEALTH CARE EDUCATION/TRAINING PROGRAM

## 2022-09-22 PROCEDURE — 80053 COMPREHEN METABOLIC PANEL: CPT | Performed by: STUDENT IN AN ORGANIZED HEALTH CARE EDUCATION/TRAINING PROGRAM

## 2022-09-22 PROCEDURE — 99283 EMERGENCY DEPT VISIT LOW MDM: CPT | Performed by: NURSE PRACTITIONER

## 2022-09-22 PROCEDURE — 25010000002 HEPARIN (PORCINE) 25000-0.45 UT/250ML-% SOLUTION: Performed by: STUDENT IN AN ORGANIZED HEALTH CARE EDUCATION/TRAINING PROGRAM

## 2022-09-22 PROCEDURE — 85610 PROTHROMBIN TIME: CPT | Performed by: STUDENT IN AN ORGANIZED HEALTH CARE EDUCATION/TRAINING PROGRAM

## 2022-09-22 PROCEDURE — 70496 CT ANGIOGRAPHY HEAD: CPT

## 2022-09-22 PROCEDURE — 71045 X-RAY EXAM CHEST 1 VIEW: CPT

## 2022-09-22 PROCEDURE — 82565 ASSAY OF CREATININE: CPT

## 2022-09-22 PROCEDURE — 86901 BLOOD TYPING SEROLOGIC RH(D): CPT

## 2022-09-22 PROCEDURE — 96365 THER/PROPH/DIAG IV INF INIT: CPT

## 2022-09-22 PROCEDURE — 85730 THROMBOPLASTIN TIME PARTIAL: CPT | Performed by: STUDENT IN AN ORGANIZED HEALTH CARE EDUCATION/TRAINING PROGRAM

## 2022-09-22 PROCEDURE — 0 POTASSIUM CHLORIDE 10 MEQ/100ML SOLUTION: Performed by: STUDENT IN AN ORGANIZED HEALTH CARE EDUCATION/TRAINING PROGRAM

## 2022-09-22 PROCEDURE — 25010000002 PROCHLORPERAZINE 10 MG/2ML SOLUTION: Performed by: STUDENT IN AN ORGANIZED HEALTH CARE EDUCATION/TRAINING PROGRAM

## 2022-09-22 PROCEDURE — 0042T HC CT CEREBRAL PERFUSION W/WO CONTRAST: CPT

## 2022-09-22 PROCEDURE — 86901 BLOOD TYPING SEROLOGIC RH(D): CPT | Performed by: STUDENT IN AN ORGANIZED HEALTH CARE EDUCATION/TRAINING PROGRAM

## 2022-09-22 PROCEDURE — 93010 ELECTROCARDIOGRAM REPORT: CPT | Performed by: INTERNAL MEDICINE

## 2022-09-22 PROCEDURE — 70498 CT ANGIOGRAPHY NECK: CPT

## 2022-09-22 PROCEDURE — 0 IOPAMIDOL PER 1 ML: Performed by: STUDENT IN AN ORGANIZED HEALTH CARE EDUCATION/TRAINING PROGRAM

## 2022-09-22 PROCEDURE — 82962 GLUCOSE BLOOD TEST: CPT

## 2022-09-22 PROCEDURE — 99284 EMERGENCY DEPT VISIT MOD MDM: CPT

## 2022-09-22 PROCEDURE — 70450 CT HEAD/BRAIN W/O DYE: CPT

## 2022-09-22 PROCEDURE — 86900 BLOOD TYPING SEROLOGIC ABO: CPT

## 2022-09-22 PROCEDURE — 93005 ELECTROCARDIOGRAM TRACING: CPT | Performed by: STUDENT IN AN ORGANIZED HEALTH CARE EDUCATION/TRAINING PROGRAM

## 2022-09-22 PROCEDURE — 86900 BLOOD TYPING SEROLOGIC ABO: CPT | Performed by: STUDENT IN AN ORGANIZED HEALTH CARE EDUCATION/TRAINING PROGRAM

## 2022-09-22 PROCEDURE — 85025 COMPLETE CBC W/AUTO DIFF WBC: CPT | Performed by: STUDENT IN AN ORGANIZED HEALTH CARE EDUCATION/TRAINING PROGRAM

## 2022-09-22 PROCEDURE — 70498 CT ANGIOGRAPHY NECK: CPT | Performed by: RADIOLOGY

## 2022-09-22 PROCEDURE — 96375 TX/PRO/DX INJ NEW DRUG ADDON: CPT

## 2022-09-22 PROCEDURE — 71045 X-RAY EXAM CHEST 1 VIEW: CPT | Performed by: RADIOLOGY

## 2022-09-22 PROCEDURE — 36415 COLL VENOUS BLD VENIPUNCTURE: CPT

## 2022-09-22 PROCEDURE — 0042T CT CEREBRAL PERFUSION W WO CONTRAST: CPT | Performed by: RADIOLOGY

## 2022-09-22 PROCEDURE — 86850 RBC ANTIBODY SCREEN: CPT | Performed by: STUDENT IN AN ORGANIZED HEALTH CARE EDUCATION/TRAINING PROGRAM

## 2022-09-22 PROCEDURE — 70496 CT ANGIOGRAPHY HEAD: CPT | Performed by: RADIOLOGY

## 2022-09-22 PROCEDURE — 99285 EMERGENCY DEPT VISIT HI MDM: CPT

## 2022-09-22 RX ORDER — HEPARIN SODIUM 5000 [USP'U]/ML
5000 INJECTION, SOLUTION INTRAVENOUS; SUBCUTANEOUS AS NEEDED
Status: DISCONTINUED | OUTPATIENT
Start: 2022-09-22 | End: 2022-09-22 | Stop reason: HOSPADM

## 2022-09-22 RX ORDER — HEPARIN SODIUM 10000 [USP'U]/100ML
7.75 INJECTION, SOLUTION INTRAVENOUS
Status: DISCONTINUED | OUTPATIENT
Start: 2022-09-22 | End: 2022-09-22 | Stop reason: HOSPADM

## 2022-09-22 RX ORDER — PROCHLORPERAZINE EDISYLATE 5 MG/ML
5 INJECTION INTRAMUSCULAR; INTRAVENOUS ONCE
Status: COMPLETED | OUTPATIENT
Start: 2022-09-22 | End: 2022-09-22

## 2022-09-22 RX ORDER — SODIUM CHLORIDE 0.9 % (FLUSH) 0.9 %
10 SYRINGE (ML) INJECTION AS NEEDED
Status: DISCONTINUED | OUTPATIENT
Start: 2022-09-22 | End: 2022-09-22 | Stop reason: HOSPADM

## 2022-09-22 RX ORDER — POTASSIUM CHLORIDE 7.45 MG/ML
10 INJECTION INTRAVENOUS
Status: DISCONTINUED | OUTPATIENT
Start: 2022-09-22 | End: 2022-09-22

## 2022-09-22 RX ORDER — HEPARIN SODIUM 5000 [USP'U]/ML
2500 INJECTION, SOLUTION INTRAVENOUS; SUBCUTANEOUS AS NEEDED
Status: DISCONTINUED | OUTPATIENT
Start: 2022-09-22 | End: 2022-09-22 | Stop reason: HOSPADM

## 2022-09-22 RX ORDER — POTASSIUM CHLORIDE 20 MEQ/1
40 TABLET, EXTENDED RELEASE ORAL ONCE
Status: DISCONTINUED | OUTPATIENT
Start: 2022-09-22 | End: 2022-09-22 | Stop reason: HOSPADM

## 2022-09-22 RX ADMIN — PROCHLORPERAZINE EDISYLATE 5 MG: 5 INJECTION INTRAMUSCULAR; INTRAVENOUS at 14:04

## 2022-09-22 RX ADMIN — HEPARIN SODIUM 7.75 UNITS/KG/HR: 10000 INJECTION, SOLUTION INTRAVENOUS at 13:42

## 2022-09-22 RX ADMIN — POTASSIUM CHLORIDE 10 MEQ: 7.46 INJECTION, SOLUTION INTRAVENOUS at 13:48

## 2022-09-22 RX ADMIN — IOPAMIDOL 100 ML: 755 INJECTION, SOLUTION INTRAVENOUS at 12:39

## 2022-09-22 NOTE — ED NOTES
"Spouse at bedside. 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   "

## 2022-09-22 NOTE — CONSULTS
[facility]   Teleneurology Note    Patient Name: Ricardo Crespo  : 1970  MRN: [id number]  Primary Care Physician: Rex Bush MD  Referring Site: [place]  Referring Provider: Dr. Arndt  Location of Neurologist: [place]    Subjective   Teleneurology Initial Data     Arrival Date Telestroke Site: 22 Arrival Time Telestroke Site: 1137   Neurologist Evaluation Date: 22 Neurologist Evaluation Time: 1228   Date Last Known Well: 22 Time Last Known Well: 1000     History     Chief Complaint: Speech difficulty/left-sided weakness/left facial droop  HPI: 52-year-old white male PMHx significant for diabetes, A. fib on Eliquis, CAD with stenting x12, hyperlipidemia, and obesity presented to [facility] ED on 2022 with complaints of speech difficulty, left-sided weakness, along with chest pain that started when he awoke from anesthesia.  Per surgery centers at half his last known well would have been 10 AM which was just before he was sedated for the procedure.  He did undergo colonoscopy and EGD, patient reports he was told he has polyps which were cauterized as well as ulcers.  Of note patient reports he has had intermittent blood in his stool for the prior 6 months.  He takes Plavix and Eliquis however his last dose of both was last Monday as he was told to hold them both prior to procedure.  Son at bedside tells us that he has noticed his father intermittently stuttering in the days leading up to today.    Stroke Risk Factors/ Pertinent Data     Stroke risk factors: coronary artery disease, diabetes, dyslipidemia, heart failure, myocardial infarction, obesity/ physical inactivity  Anticoagulants prior to arrival: none  Antiplatelets prior to arrival: none     Pre- Stroke Modified Mcbrides Scale     Pre-Stroke Modified Mcbrides Scale: 0 - No Symptoms at all.    NIH Stroke Scale     NIHSS Performed Date: 22 NIHSS Performed Time:    Interval: baseline  1a.  Level of Consciousness: 0-->Alert, keenly responsive  1b. LOC Questions: 1-->Answers one question correctly  1c. LOC Commands: 0-->Performs both tasks correctly  2. Best Gaze: 0-->Normal  3. Visual: 0-->No visual loss  4. Facial Palsy: 0-->Normal symmetrical movements  5a. Motor Arm, Left: 1-->Drift, limb holds 90 (or 45) degrees, but drifts down before full 10 seconds, does not hit bed or other support  5b. Motor Arm, Right: 0-->No drift, limb holds 90 (or 45) degrees for full 10 secs  6a. Motor Leg, Left: 1-->Drift, leg falls by the end of the 5-sec period but does not hit bed  6b. Motor Leg, Right: 0-->No drift, leg holds 30 degree position for full 5 secs  7. Limb Ataxia: 1-->Present in one limb  8. Sensory: 1-->Mild-to-moderate sensory loss, patient feels pinprick is less sharp or is dull on the affected side, or there is a loss of superficial pain with pinprick, but patient is aware of being touched  9. Best Language: 0-->No aphasia, normal  10. Dysarthria: 0-->Normal  11. Extinction and Inattention (formerly Neglect): 0-->No abnormality  Total (NIH Stroke Scale): 5     Review of Systems     Review of Systems   Constitutional: Negative for chills and fever.   Eyes: Negative.    Respiratory: Negative.    Cardiovascular: Positive for chest pain.   Gastrointestinal: Positive for blood in stool.   Skin: Negative.    Neurological: Positive for facial asymmetry, speech difficulty, weakness and numbness.   Psychiatric/Behavioral: Positive for confusion. The patient is nervous/anxious.        Objective   Exam     Exam performed with the help of support staff from the referring site  Neurological Exam  Mental Status  Awake and alert. Oriented only to person, place and situation. Speech is normal. Speech: Occasional stuttering, hesitancy when speaking. Language is fluent with no aphasia.    Cranial Nerves  CN II: Visual acuity is normal. Visual fields full to confrontation.  CN III, IV, VI: Extraocular movements intact  bilaterally. Normal lids and orbits bilaterally. Pupils equal round and reactive to light bilaterally.  CN V:  Right: Diminished sensation of the entire right side of the face.  CN VII: Full and symmetric facial movement.  CN IX, X: Palate elevates symmetrically  CN XI: Shoulder shrug strength is normal.  CN XII: Tongue midline without atrophy or fasciculations.    Motor  Normal muscle bulk throughout. Normal muscle tone. No abnormal involuntary movements.  Strength 4/5 in left extremities, 5/5 in right.    Sensory  Light touch abnormality:   Decreased sensation to light touch on the left side.    Coordination  Left: Finger-to-nose abnormality:  Mild difficulty with FNF using left arm.    Gait  Casual gait: Hesitant gait.  Patient was able to take a few steps around the room independently, gait was hesitant/slow.      Result Review    Results          Personal review of CNS imaging:(Official report by radiologist pending)  Imaging  CT Imaging Review: CT Imaging reviewed, NO acute infarct/ hemorrhage seen  CTA Imaging Review: CTA Imaging reviewed, NO large vessel occlusion or severe stenosis seen    Thrombolytic   Thrombolytics: tPA not given  Tissue Plasminogen Activator (tPA) Relative Exclusions for All Patients: Only minor non-disabling symptoms, Recent GI or urinary tract hemorrhage (within previous 21 days)     Risk/benefits of thrombolytics were discussed extensively with the patient and his family at bedside.  Advised patient he may have increased risk of major bleeding due to recent procedure polyp removal and cautery performed today.  Patient also reports blood in his stool intermittently for the past 6 months.  Also explained to him that if he is indeed having a stroke the use of thrombolytics could improve his symptoms or prevent worsening.  Patient's symptoms improving significantly since initial arrival, and he declined to receive thrombolytics.      Assessment & Plan   Assessment/ Plan      Assessment:  Acute Stroke Evaluation: Suspected TIA or non disabling stroke- the risks of IV thrombolytic outweigh the benefits of treatment     52-year-old white male with vascular risk factors including diabetes, A. fib, MI, and obesity presenting with left-sided deficits that were noted after awakening from anesthesia after having had a EGD/colonoscopy performed.  Patient was sedated at 10 AM this morning and that serves as his last known well.  Patient is within the window for TNKase, however after extensive discussion on risk/benefit discussion on risk/benefit the patient decided to not proceed with thrombolytics.  Of note patient's exam has improved since initial ED provider evaluation.  We recommend admission to telemetry for observation and stroke work-up.  CT head did not show any acute ischemic event.  CTA head/neck did not show any evidence of LVO, no hemodynamically significant stenosis.  No perfusion deficit on CTP.We recommend admission to telemetry for observation and stroke work-up.       Plan:  -Admit to telemetry  -MRI brain without contrast  -TTE with bubble study  -A1c/lipid profile in the a.m.  -SLP/PT/OT  -Start heparin drip, no bolus ever  -Atorvastatin 80 mg nightly  -Stroke education to patient/family    The case was discussed with the patient, his son at bedside, and the ED team.  Thank you for the consultation.           Disposition     Disposition: The patient will remain at the referring institution for further evaluation and management    Medical Decision Making  Medical Data Reviewed: Data reviewed including: clinical labs, radiology and/or medical tests, Independent review of CNS images    Vincent NGUYEN APRN along with Dr. Duran, saw the patient on 09/22/22 at 1228 for an initial in-patient or emergency room telememedicine face to face consult using interactive technology for. The location of the patient was [place].  Dr. Duran was located in [place].    I have proceeded  with this evaluation at the request of the referring practitioner as it is felt to be an emergency setting and no appropriate specialist is available to perform this evaluation. The [facility] has reported that this is the correct patient and has obtained consent from the patient/surrogate to perform this telemedicine evaluation(including obtaining history, performing examination and reviewing data provided by the patient an/or originating site of care provider)    I have introduced myself to the patient, provided my credentials, disclosed my location, and determined that, based on review of the patient's chart and discussion with the patient's primary team, telemedicine via a HIPAA compliant, real-time, face-to-face two-way, interactive audio and video platform is an appropriate and effective means of providing the service.    The patient/surrogate has a right to refuse this evaluation as they have been explained risks including potential loss of confidentiality, benefits, alternatives, and the potential need for subsequent face-to-face care. In this evaluation, we will be providing recommendations only.  The ultimate decision to follow or not to follow these recommendations will be left to the bedside treating/requesting practitioner.    The patient/surrogate has been notified that other healthcare professionals including technical person may be involved in this A/V evaluation.  All laws concerning confidentiality and patient access to medical records and copies of medical records apply to telemedicine.  The patient/surrogate has received the [facility]'s Health Notice of Privacy Practices.    KRISTEN Cortez

## 2022-09-22 NOTE — DISCHARGE INSTRUCTIONS
We recommended admission.  Please note that you may return anytime for completion of work-up or for new onset or worsening symptoms without repercussion.

## 2022-09-22 NOTE — ED NOTES
In to d/c pt AMA, pts IVs x 3 lying on counter. IV pump still on. Pts gown found on bed. AMA form not signed. Provider made aware, reports pt had expressed his wants to go home and that he felt like he had returned to his base line and would return to ED if condition worsened. Provider had educated pt on the risks of signing out AMA, including worsening of condition, or possible death. Pt was A&Ox4 according to provider and he verbalized understanding.

## 2022-09-22 NOTE — ED NOTES
Upon arrival pt reports when he awoke from procedure he started having cp and numbness to left side of face.

## 2022-09-22 NOTE — ED NOTES
Pt refusing bedpan and bedside commode. Attempted to educate pt the risks of getting up. He continues to refuse. Spoke with provider, Dr. Guidry. He reports ok for pt to go to restroom with staff and wheelchair.

## 2022-09-22 NOTE — ED PROVIDER NOTES
[facility]  emergency department encounter        Pt Name: Ricardo Crespo  MRN: [id number]  Birthdate 1970  Date of evaluation: 9/22/2022    Chief Complaint   Patient presents with   • Chest Pain             HISTORY OF PRESENT ILLNESS:   Ricardo Crespo is a 52 y.o. male presents for development of chest pain and left-sided numbness and weakness upon awakening after endoscopy.  Patient was put under at 10:00 which is his last known normal, woke up with symptoms around 1030.  Also endorses difficulty speaking, stuttering speech.  Symptoms constant, no progressive worsening or improvement since  onset.  On Eliquis, last dose 2 days prior.      No other exacerbating or alleviating factors other than as noted above.  Severity: Severe    PCP: Rex Bush MD          REVIEW OF SYSTEMS:     Review of Systems   Constitutional: Negative for fever.   HENT: Negative for congestion and rhinorrhea.    Eyes: Negative for visual disturbance.   Respiratory: Negative for cough and shortness of breath.    Cardiovascular: Positive for chest pain.   Gastrointestinal: Negative for abdominal pain, nausea and vomiting.   Genitourinary: Negative for dysuria.   Musculoskeletal: Negative for myalgias.   Skin: Negative for rash.   Neurological: Positive for facial asymmetry, speech difficulty and weakness. Negative for headaches.   Psychiatric/Behavioral: Negative for confusion.       Review of systems otherwise as per HPI.          PREVIOUS HISTORY:         Past Medical History:   Diagnosis Date   • Abnormal ECG    • Alcohol liver damage (HCC)    • Anxiety    • Arrhythmia    • Atrial fibrillation (HCC)    • Benign hypertension    • Bipolar depression (HCC)    • CHF (congestive heart failure) (HCC)    • Cirrhosis of liver (HCC)    • Clotting disorder (HCC)    • Coronary artery disease    • DVT (deep venous thrombosis) (HCC)     Questionable history of deep venous thrombosis.   • Dyslipidemia    •  Hyperlipidemia    • Myocardial infarction (HCC)     x 2   • Obesity    • JIM on CPAP     Obstructive sleep apnea, compliant with CPAP.    • Type 2 diabetes mellitus (HCC)    • Valvular disease            Past Surgical History:   Procedure Laterality Date   • ANAL FISTULA REPAIR     • CARDIAC CATHETERIZATION     • CARDIAC CATHETERIZATION     • CARDIAC CATHETERIZATION N/A 2021    Procedure: Left Heart Cath;  Surgeon: Adam Cortez MD;  Location:  [facility] INV[facility];  Service: Cardiology;  Laterality: N/A;   • CARDIAC CATHETERIZATION N/A 2021    Procedure: LEFT HEART CATH;  Surgeon: Juvencio Welch MD;  Location:  [facility];  Service: Cardiovascular;  Laterality: N/A;   • CAROTID STENT     • CHOLECYSTECTOMY     • CORONARY ANGIOPLASTY WITH STENT PLACEMENT Left 2015    Persistent cardiac symptoms with cardiac catheterization, Dr. Moss, with PTCA and stenting of the mid-LAD, 2015.   • GANGLION CYST EXCISION      left knee   • PYLOROMYOTOMY             Social History     Socioeconomic History   • Marital status:    Tobacco Use   • Smoking status: Former Smoker     Packs/day: 1.00     Years: 15.00     Pack years: 15.00     Types: Cigarettes     Start date: 1985     Quit date:      Years since quittin.7   • Smokeless tobacco: Never Used   • Tobacco comment: Quit 12 years ago; smoked 17 years at 1 PPD   Substance and Sexual Activity   • Alcohol use: No     Comment: Quit 12 years ago; drank a fifth a day for 20 years   • Drug use: No   • Sexual activity: Yes     Partners: Female     Birth control/protection: None           Family History   Problem Relation Age of Onset   • Heart disease Father         CAD, 25 year old   • Nephrolithiasis Father    • Heart attack Father    • Atrial fibrillation Mother    • Arrhythmia Mother    • No Known Problems Sister          Current Outpatient Medications   Medication Instructions   • apixaban  (ELIQUIS) 5 mg, Oral, 2 Times Daily   • clopidogrel (PLAVIX) 75 mg, Oral, Daily   • furosemide (LASIX) 40 mg, Oral, 2 Times Daily   • Insulin Infusion Pump device Does not apply, humalog    • ketorolac (TORADOL) 10 mg, Oral, Every 6 Hours PRN   • lithium carbonate 300 MG capsule Oral, 2 times daily, 900 mg po qam and 600 mg qpm   • nitroglycerin (NITROSTAT) 0.4 MG SL tablet PLACE 1 TABLET UNDER THE TONGUE EVERY 5 MINUTES UP TO 3 TABLETS IN 15 MINUTES FOR CHEST PAIN. IF NO RELIEF GO TO THE EMERGENCY ROOM OR CALL   • promethazine (PHENERGAN) 25 mg, Oral, Every 12 Hours PRN   • sertraline (ZOLOFT) 200 mg, Oral, Daily   • sildenafil (VIAGRA) 100 MG tablet Take 1/4 TO 1 TABLET DAILY AS NEEDED 30 minutes prior TO intercourse   • tamsulosin (FLOMAX) 0.4 mg, Oral, Daily         Allergies:  Beta adrenergic blockers, Calcium channel blockers, and Imdur [isosorbide dinitrate]    Medications, allergies and past medical, surgical, family, and social history reviewed.        PHYSICAL EXAM:     Physical Exam  Vitals and nursing note reviewed.   Constitutional:       General: He is not in acute distress.     Appearance: Normal appearance.   HENT:      Head: Normocephalic and atraumatic.   Eyes:      Extraocular Movements: Extraocular movements intact.      Conjunctiva/sclera: Conjunctivae normal.   Pulmonary:      Effort: Pulmonary effort is normal. No respiratory distress.   Abdominal:      General: Abdomen is flat. There is no distension.   Musculoskeletal:         General: No deformity. Normal range of motion.      Cervical back: Normal range of motion. No rigidity.   Neurological:      General: No focal deficit present.      Mental Status: He is alert. He is disoriented.      Sensory: Sensory deficit present.      Motor: Weakness present.      Comments: Not oriented to age or month or year.  Sided facial droop with subjective numbness throughout the left side, significant active axion left-sided FTN.  Stuttering speech  consistent with aphasia.  Extraocular movement intact without nystagmus, visual fields full.  Left facial droop.   Psychiatric:         Mood and Affect: Mood normal.         Behavior: Behavior normal.             COMPLETED DIAGNOSTIC STUDIES AND INTERVENTIONS:     Lab Results (last 24 hours)     Procedure Component Value Units Date/Time    POC Glucose Once [[id number]]  (Abnormal) Collected: 09/22/22 1146    Specimen: Blood Updated: 09/22/22 1211     Glucose 245 mg/dL      Comment: Meter: [id number] : [id number] anurag moore       CBC & Differential [[id number]]  (Abnormal) Collected: 09/22/22 1207    Specimen: Blood Updated: 09/22/22 1244    Narrative:      The following orders were created for panel order CBC & Differential.  Procedure                               Abnormality         Status                     ---------                               -----------         ------                     CBC Auto Differential[[id number]]        Abnormal            Final result               Scan Slide[[id number]]                                                                    Please view results for these tests on the individual orders.    Comprehensive Metabolic Panel [[id number]]  (Abnormal) Collected: 09/22/22 1207    Specimen: Blood Updated: 09/22/22 1301     Glucose 174 mg/dL      BUN 11 mg/dL      Creatinine 0.98 mg/dL      Sodium 140 mmol/L      Potassium 3.1 mmol/L      Comment: Slight hemolysis detected by analyzer. Results may be affected.  1+ HEMOLYSIS        Chloride 101 mmol/L      CO2 25.7 mmol/L      Calcium 8.3 mg/dL      Total Protein 7.2 g/dL      Albumin 4.00 g/dL      ALT (SGPT) 26 U/L      AST (SGOT) 42 U/L      Alkaline Phosphatase 78 U/L      Total Bilirubin 0.8 mg/dL      Globulin 3.2 gm/dL      A/G Ratio 1.3 g/dL      BUN/Creatinine Ratio 11.2     Anion Gap 13.3 mmol/L      eGFR 91.6 mL/min/1.73      Comment: National Kidney Foundation and American Society of Nephrology (ASN) Task Force  recommended calculation based on the Chronic Kidney Disease Epidemiology Collaboration (CKD-EPI) equation refit without adjustment for race.       Narrative:      GFR Normal >60  Chronic Kidney Disease <60  Kidney Failure <15      Protime-INR [[id number]]  (Abnormal) Collected: 09/22/22 1207    Specimen: Blood Updated: 09/22/22 1243     Protime 14.9 Seconds      INR 1.14    Narrative:      Suggested INR therapeutic range for stable oral anticoagulant therapy:    Low Intensity therapy:   1.5-2.0  Moderate Intensity therapy:   2.0-3.0  High Intensity therapy:   2.5-4.0    aPTT [[id number]]  (Normal) Collected: 09/22/22 1207    Specimen: Blood Updated: 09/22/22 1243     PTT 31.7 seconds     Narrative:      PTT Heparin Therapeutic Range:  59 - 95 seconds      CBC Auto Differential [[id number]]  (Abnormal) Collected: 09/22/22 1207    Specimen: Blood Updated: 09/22/22 1244     WBC 8.63 10*3/mm3      RBC 4.80 10*6/mm3      Hemoglobin 12.9 g/dL      Hematocrit 38.7 %      MCV 80.6 fL      MCH 26.9 pg      MCHC 33.3 g/dL      RDW 14.3 %      RDW-SD 41.5 fl      MPV 10.5 fL      Platelets 111 10*3/mm3      Neutrophil % 73.3 %      Lymphocyte % 15.2 %      Monocyte % 6.4 %      Eosinophil % 3.6 %      Basophil % 1.3 %      Immature Grans % 0.2 %      Neutrophils, Absolute 6.33 10*3/mm3      Lymphocytes, Absolute 1.31 10*3/mm3      Monocytes, Absolute 0.55 10*3/mm3      Eosinophils, Absolute 0.31 10*3/mm3      Basophils, Absolute 0.11 10*3/mm3      Immature Grans, Absolute 0.02 10*3/mm3      nRBC 0.0 /100 WBC     Troponin [[id number]]  (Normal) Collected: 09/22/22 1207    Specimen: Blood Updated: 09/22/22 1250     Troponin T <0.010 ng/mL     Narrative:      Troponin T Reference Range:  <= 0.03 ng/mL-   Negative for AMI  >0.03 ng/mL-     Abnormal for myocardial necrosis.  Clinicians would have to utilize clinical acumen, EKG, Troponin and serial changes to determine if it is an Acute Myocardial Infarction or myocardial injury due to  an underlying chronic condition.       Results may be falsely decreased if patient taking Biotin.              XR Chest 1 View   Final Result   No radiographic evidence of acute cardiac or pulmonary disease.       This report was finalized on 9/22/2022 12:59 PM by Dr. Satya Adhikari MD.          CT CEREBRAL PERFUSION WITH & WITHOUT CONTRAST   Final Result   No significant appearing focal perfusion abnormality on today's study.       This report was finalized on 9/22/2022 4:49 PM by Dr. Satya Adhikari MD.          CT Angiogram Head w AI Analysis of LVO   Final Result     No acute findings in the arteries of the head/brain.       This report was finalized on 9/22/2022 1:24 PM by Dr. Satya Adhikari MD.          CT Angiogram Neck   Final Result     No acute findings in the arteries of the neck.       This report was finalized on 9/22/2022 1:25 PM by Dr. Satya Adhikari MD.          CT Head Without Contrast Stroke Protocol   Final Result   1. No evidence of an acute ischemic event.   2. No parenchymal mass, hemorrhage, or midline shift.       Results were relayed to the emergency department at 11:53 AM.       This report was finalized on 9/22/2022 1:18 PM by Dr. Satya Adhikari MD.                New Medications Ordered This Visit   Medications   • iopamidol (ISOVUE-370) 76 % injection 100 mL   • prochlorperazine (COMPAZINE) injection 5 mg         Procedures            MEDICAL DECISION-MAKING AND ED COURSE:     ED Course as of 09/22/22 2038   Thu Sep 22, 2022   1207 EKG at 1143 NSR 73 bpm, , , QTc 515, left axis deviation, RBBB, Q waves in lead V1, poor R wave progression.  No acute ischemia by ST deviation or T wave normalities.  No STEMI. [KP]   2037 MDM: 52-year-old male presents with chest pain and strokelike symptoms.  Patient went straight to CT for stroke protocol.  Discussed case with Dr. Duran, neurology who evaluated patient.  Patient symptoms have significantly improved at that time.  Patient's wife later  came and said that he had had the exact same symptoms after a prior procedure.  CT imaging negative.  Patient reported his chest pain was much more severe but had significantly improved and was at its baseline.  Reported that his chest pain had not resolved however that it was his baseline and wanted to go home.  I strongly recommended patient be admitted for his continued chest pain as well as MRI to complete stroke work-up.  Patient declined opting to leave AMA. [KP]   2038 The patient has requested to leave, refusing further work-up, completion of ongoing work-up, and/or admission at this time. I assessed the patient and discussed the risks of premature discharge including missed diagnosis, misdiagnosis, worsening condition, disability, and/or death. The patient was alert and oriented, with organized thought process, and without distracting influence. The patient was clearly able to communicate understanding of their illness and the risks of premature discharge--including potential disability and death. The patient showed appreciation for the risks and was able to appropriately, in line with their stated goals, weigh them against the benefits of recommended continued medical treatment. The patient still chose to leave without further intervention, waiting for work-up completion, and/or admission. I pleaded with the patient to stay to complete the workup, and offered to discuss alternative treatments, therapies, and testing, but the patient declined and insisted on immediate discharge.    I recommended that should the patient change their mind and/or should symptoms worsen or new symptoms develop, they should return for re-evaluation, work-up, and treatment and that there would be no repercussion for doing so.  The patient expressed understanding. The patient displayed appropriate capacity for this decision and does not meet criteria for involuntary treatment and so was discharged, amicably, against medical  advice.   [KP]      ED Course User Index  [KP] Vincent Guidry MD       ?      FINAL IMPRESSION:       1. Stroke-like symptoms    2. Acute chest pain         The complaints listed here are new problems to this examiner.      FOLLOW-UP  Rex Bush MD  [address]  [facility] [address]  [phone]    Schedule an appointment as soon as possible for a visit       [facility] Emergency Department  [address] [facility] [address] [phone]  Schedule an appointment as soon as possible for a visit         DISPOSITION  ED Disposition     ED Disposition   AMA    Condition   --    Comment   --                   This care is provided during an unprecedented national emergency due to the Novel Coronavirus (COVID-19). COVID-19 infections and transmission risks place heavy strains on healthcare resources. As this pandemic evolves, the Hospital and providers strive to respond fluidly, to remain operational, and to provide care relative to available resources and information. Outcomes are unpredictable and treatments are without well-defined guidelines. Further, the impact of COVID-19 on all aspects of emergency care, including the impact to patients seeking care for reasons other than COVID-19, is unavoidable during this national emergency.    This note was dictated using a [id number] tool. Occasional wrong-word or 'sound-a-like' substitutions may have occurred due to the inherent limitations of voice recognition software. ?Read the chart carefully and recognize, using context, where substitutions have occurred.    Vincent Guidry MD  20:38 EDT  9/22/2022             Vincent Guidry MD  09/22/22 2038

## 2022-09-22 NOTE — ED NOTES
Pts facial droop improved. C/O IV K+ burning, provider made aware. Reports to recheck pts dysphagia screening and attempt PO K+.

## 2022-09-26 LAB — CREAT BLDA-MCNC: 1.1 MG/DL (ref 0.6–1.3)

## 2022-09-29 LAB — CREAT BLDA-MCNC: NORMAL MG/DL

## 2022-10-17 RX ORDER — CLOPIDOGREL BISULFATE 75 MG/1
TABLET ORAL
Qty: 30 TABLET | Refills: 11 | Status: SHIPPED | OUTPATIENT
Start: 2022-10-17 | End: 2023-01-24 | Stop reason: ALTCHOICE

## 2022-11-29 ENCOUNTER — OFFICE VISIT (OUTPATIENT)
Dept: CARDIOLOGY | Facility: CLINIC | Age: 52
End: 2022-11-29

## 2022-11-29 VITALS
WEIGHT: 292 LBS | SYSTOLIC BLOOD PRESSURE: 133 MMHG | HEART RATE: 101 BPM | HEIGHT: 72 IN | BODY MASS INDEX: 39.55 KG/M2 | DIASTOLIC BLOOD PRESSURE: 91 MMHG

## 2022-11-29 DIAGNOSIS — R07.2 PRECORDIAL PAIN: Primary | ICD-10-CM

## 2022-11-29 DIAGNOSIS — R00.2 PALPITATIONS: ICD-10-CM

## 2022-11-29 DIAGNOSIS — R06.02 SHORTNESS OF BREATH: ICD-10-CM

## 2022-11-29 DIAGNOSIS — I25.119 CORONARY ARTERY DISEASE INVOLVING NATIVE CORONARY ARTERY OF NATIVE HEART WITH ANGINA PECTORIS: ICD-10-CM

## 2022-11-29 PROCEDURE — 99214 OFFICE O/P EST MOD 30 MIN: CPT | Performed by: PHYSICIAN ASSISTANT

## 2022-11-29 NOTE — PROGRESS NOTES
Subjective   Ricardo Crespo is a 52 y.o. male     Chief Complaint   Patient presents with   • Follow-up   • Chest Pain   PROBLEM LIST:      1. Coronary artery disease  1.1. Stenting of the LAD, 11/2015, per Dr. Moss.  1.2. Repeat catheterization 2-3 weeks after stenting as above, indicating patent stent with 40% PDA stenosis. Medical management was recommended.  1.3. Repeat catheterization, 01/2016, indicating nonobstructive disease with patent stent. Medical management was recommended.  1.4.  Stenting of the PDA, 03/2017, in the setting of low-level symptoms and abnormal stress test.  Previous stent to the LAD was patent.  The patient had nonobstructive disease otherwise.  Medical management was recommended.  1.5.  Stenting of the mid RCA and PTCA only of the distal LAD, 09/17.  1.6.  Repeat LHC, 11/17, in the setting of acute chest pain.  The patient had 50% LAD stenosis, patent stents, and non-obstructive CAD otherwise.  1. 7 repeat left heart catheter March 2018 with stenting of the LAD ×2.  Nonobstructive disease otherwise and patent stent with medical management recommended  1.8 left heart cath 10/16/18-30-40% LAD, 40% apical branch, 10% circumflex left, right coronary artery 10%, EF 60%  1.9 LHC, 2-8-19, EF 55-60%, demonstrated widely patent epicardial coronary arteries with diffuse non-obstructive disease with empiric therapy for ischemia and risk factor manag.  1.10 Repeat left heart catheterization, 9/2021, in the setting of low level symptoms compatible with angina/ischemia.  The patient had small vessel disease with nonobstructive disease noted otherwise.  No targets for intervention were noted.  Medical management was recommended.  Previously placed stents were patent.  1.11 Repeat heart catheterization, 4/2022 with eventual PTCA of the second diagonal of the LAD and stenting of the recurrent apical branch of the LAD.  His previous catheterization had demonstrated the segments of disease, but  because of persistent chest pain he eventually had to be returned for intervention.  2. Preserved systolic function  3. Hypertension  4. Dyslipidemia  5. Diabetes mellitus  6. Sleep apnea  7. Reported history of DVT   8. Atrial Fibrillation      HPI  The patient presents in the clinic today for routine follow-up.  He really has not done well.  He reports increasing chest pain and tightness.  This is exactly what he is experienced with his cardiac presentations in the past requiring intervention.  He apparently had an evaluation 2 to 3 months ago where nuclear stress test by his report was benign.  He is now not doing well.  His symptoms are really intensified and are basically very much unstable by description.  He has even started having rest chest pain.  Dyspnea has intensified.  He has palpitations but no sustained dysrhythmic activity.  He has no failure symptoms.  He has no further complaints.  I have offered consideration for admission.  He absolutely refuses admission as he has not been happy with recent admissions to the hospital locally.  He does not want to go to a different institution.      Current Outpatient Medications   Medication Sig Dispense Refill   • apixaban (ELIQUIS) 5 MG tablet tablet Take 1 tablet by mouth 2 (Two) Times a Day. 60 tablet 11   • clopidogrel (PLAVIX) 75 MG tablet TAKE 1 TABLET ONCE A DAY TO PREVENT CLOTTING 30 tablet 11   • furosemide (LASIX) 40 MG tablet Take 40 mg by mouth 2 (Two) Times a Day.     • Insulin Infusion Pump device humalog     • ketorolac (TORADOL) 10 MG tablet Take 1 tablet by mouth Every 6 (Six) Hours As Needed for Moderate Pain . 16 tablet 0   • lithium carbonate 300 MG capsule Take  by mouth 2 (two) times a day. 900 mg po qam and 600 mg qpm  2   • nitroglycerin (NITROSTAT) 0.4 MG SL tablet PLACE 1 TABLET UNDER THE TONGUE EVERY 5 MINUTES UP TO 3 TABLETS IN 15 MINUTES FOR CHEST PAIN. IF NO RELIEF GO TO THE EMERGENCY ROOM OR CALL 25 tablet 3   • promethazine  (PHENERGAN) 25 MG tablet Take 1 tablet by mouth Every 12 (Twelve) Hours As Needed for Nausea. 20 tablet 0   • sertraline (ZOLOFT) 100 MG tablet Take 200 mg by mouth Daily.  2   • sildenafil (VIAGRA) 100 MG tablet Take 1/4 TO 1 TABLET DAILY AS NEEDED 30 minutes prior TO intercourse     • tamsulosin (FLOMAX) 0.4 MG capsule 24 hr capsule Take 1 capsule by mouth Daily. 30 capsule 1     No current facility-administered medications for this visit.       Beta adrenergic blockers, Calcium channel blockers, and Imdur [isosorbide dinitrate]    Past Medical History:   Diagnosis Date   • Abnormal ECG    • Alcohol liver damage (HCC)    • Anxiety    • Arrhythmia    • Atrial fibrillation (HCC)    • Benign hypertension    • Bipolar depression (HCC)    • CHF (congestive heart failure) (HCC)    • Cirrhosis of liver (HCC)    • Clotting disorder (HCC)    • Coronary artery disease    • DVT (deep venous thrombosis) (HCC)     Questionable history of deep venous thrombosis.   • Dyslipidemia    • Hyperlipidemia    • Myocardial infarction (HCC)     x 2   • Obesity    • JIM on CPAP     Obstructive sleep apnea, compliant with CPAP.    • Type 2 diabetes mellitus (HCC)    • Valvular disease        Social History     Socioeconomic History   • Marital status:    Tobacco Use   • Smoking status: Former     Packs/day: 1.00     Years: 15.00     Pack years: 15.00     Types: Cigarettes     Start date: 1985     Quit date:      Years since quittin.9   • Smokeless tobacco: Never   • Tobacco comments:     Quit 12 years ago; smoked 17 years at 1 PPD   Substance and Sexual Activity   • Alcohol use: No     Comment: Quit 12 years ago; drank a fifth a day for 20 years   • Drug use: No   • Sexual activity: Yes     Partners: Female     Birth control/protection: None       Family History   Problem Relation Age of Onset   • Heart disease Father         CAD, 25 year old   • Nephrolithiasis Father    • Heart attack Father    • Atrial fibrillation  "Mother    • Arrhythmia Mother    • No Known Problems Sister        Review of Systems   Constitutional: Negative.  Negative for activity change, appetite change, chills, fatigue and fever.   HENT: Negative.  Negative for congestion.    Eyes: Negative.  Negative for visual disturbance.   Respiratory: Positive for chest tightness and shortness of breath. Negative for apnea.    Cardiovascular: Positive for chest pain, palpitations and leg swelling (Keo legs).   Gastrointestinal: Negative.  Negative for blood in stool.   Endocrine: Negative.  Negative for cold intolerance and heat intolerance.   Genitourinary: Negative.  Negative for hematuria.   Musculoskeletal: Positive for arthralgias. Negative for back pain, gait problem, joint swelling, neck pain and neck stiffness.   Allergic/Immunologic: Negative.  Negative for food allergies.   Neurological: Negative.  Negative for dizziness, syncope, weakness, light-headedness, numbness and headaches.   Hematological: Bruises/bleeds easily.   Psychiatric/Behavioral: Positive for sleep disturbance.       Objective     Vitals:    11/29/22 1531   BP: 133/91   BP Location: Left arm   Patient Position: Sitting   Cuff Size: Adult   Pulse: 101   Weight: 132 kg (292 lb)   Height: 182.9 cm (72\")        /91 (BP Location: Left arm, Patient Position: Sitting, Cuff Size: Adult)   Pulse 101   Ht 182.9 cm (72\")   Wt 132 kg (292 lb)   BMI 39.60 kg/m²      Lab Results (most recent)     None          Physical Exam  Vitals and nursing note reviewed.   Constitutional:       General: He is not in acute distress.     Appearance: He is well-developed.   HENT:      Head: Normocephalic and atraumatic.   Eyes:      Conjunctiva/sclera: Conjunctivae normal.      Pupils: Pupils are equal, round, and reactive to light.   Neck:      Vascular: No JVD.      Trachea: No tracheal deviation.   Cardiovascular:      Rate and Rhythm: Normal rate and regular rhythm.      Heart sounds: Normal heart sounds. " "  Pulmonary:      Effort: Pulmonary effort is normal.      Breath sounds: Normal breath sounds.   Abdominal:      General: Bowel sounds are normal. There is no distension.      Palpations: Abdomen is soft. There is no mass.      Tenderness: There is no abdominal tenderness. There is no guarding or rebound.   Musculoskeletal:         General: No tenderness or deformity. Normal range of motion.      Cervical back: Normal range of motion and neck supple.   Skin:     General: Skin is warm and dry.      Coloration: Skin is not pale.      Findings: No erythema or rash.   Neurological:      Mental Status: He is alert and oriented to person, place, and time.   Psychiatric:         Behavior: Behavior normal.         Thought Content: Thought content normal.         Judgment: Judgment normal.         Procedure   Procedures         Assessment & Plan      Diagnosis Plan   1. Precordial pain  [place] Cath    CBC & Differential    Comprehensive Metabolic Panel      2. Coronary artery disease involving native coronary artery of native heart with angina pectoris (HCC)  [place] Cath    CBC & Differential    Comprehensive Metabolic Panel      3. Palpitations  [place] Cath    CBC & Differential    Comprehensive Metabolic Panel      4. Shortness of breath  [place] Cath    CBC & Differential    Comprehensive Metabolic Panel        1.  The patient has low-level chest pain compatible with unstable angina.  I have recommended admission.  We have recommended ER evaluation.  He wants no consideration for admission or ER evaluation, acknowledging risk.  I am particularly concerned as he told me today he is very close to a \"heart attack\".  I have strongly recommended admission.  The patient acknowledges risk but again would prefer outpatient only.    2.  In that setting, I will try to expedite outpatient cath, again encouraging him to proceed onto the emergency room.    3.  He would like to have that with " Arthur.  We will request an appointment with him as soon as possible.    4.  I really have adjusted virtually all medications on this gentleman in terms of antianginal and medication regimens otherwise.  He is either been intolerant or these were really ineffective.  I do not feel we can adjust medications further.  He will continue antiplatelet and anticoagulant therapy.  He is intolerant to statin therapy and could not afford PCSK9 inhibitor therapy.           Patient did not bring med list or medicine bottles to appointment, med list has been reviewed and updated based on patient's knowledge of their meds.       Electronically signed by:

## 2022-12-13 ENCOUNTER — TELEPHONE (OUTPATIENT)
Dept: CARDIOLOGY | Facility: CLINIC | Age: 52
End: 2022-12-13

## 2022-12-13 DIAGNOSIS — R07.2 PRECORDIAL PAIN: ICD-10-CM

## 2022-12-13 DIAGNOSIS — I25.119 CORONARY ARTERY DISEASE INVOLVING NATIVE CORONARY ARTERY OF NATIVE HEART WITH ANGINA PECTORIS: Primary | ICD-10-CM

## 2022-12-13 NOTE — TELEPHONE ENCOUNTER
PER RENETTA ESTEVEZ,PAC VERBAL AFTER SPEAKING WITH DR. HERNANDEZ PATIENT IS AWARE WILL BE SENT TO DR. MACHUCA FOR FURTHER EVALUATION, POSS SURGERY. PATIENT IS AWARE OF THIS.     Denies any sx at this time, any new or worsening go to ER.

## 2022-12-13 NOTE — TELEPHONE ENCOUNTER
Patient called to report he had cath by Dr Gibson and sent to [facility] in [place] for CABG last week on 12/8. He states he was on the table and the doctor stopped prior to cutting him open then sent him home. He wants to know what is going on and why they did not do surgery.   We have no records on cath or visit at [facility]. Can someone obtain records for Grzegorz to review. He has cath follow up scheduled on 1/5/23.

## 2022-12-19 ENCOUNTER — TELEPHONE (OUTPATIENT)
Dept: CARDIOLOGY | Facility: CLINIC | Age: 52
End: 2022-12-19

## 2022-12-19 NOTE — TELEPHONE ENCOUNTER
Spoke with Cristel RIVERS for update, states that they have received waiting on them to continue. Tried contacting patient and his wife, no answer. Will call again at a later time.

## 2022-12-19 NOTE — TELEPHONE ENCOUNTER
----- Message from Ricardo Crespo sent at 12/18/2022  9:39 AM EST -----  Regarding: appointment  Contact: [phone]  Hello, was wondering if you could have someone check on my appointment with .  My symptoms are still on the severe side.   thanks very much.  tian

## 2022-12-19 NOTE — TELEPHONE ENCOUNTER
Spoke with patients wife, she states he is having chest pain that comes and goes. She is not with him at this time, I tried to contact his phone and no answer. I advised to her with active chest pain, he needs to go on to the ER at this time, she verbally understands.    I spoke with Alaina vasquez at  office, said that it is on  desk and waiting for him to continue.      Spoke with patient and he state he has been having chest pain, and off/on and jaw pain. Doesn't want to go to ER til its a must. I advised patient our recommendations at this time, is to go to ER. He verbally understands, denied and is monitoring at this time he states

## 2022-12-19 NOTE — TELEPHONE ENCOUNTER
PT'S WIFE CALLED BACK IN. HUB UNABLE TO TRANSFER. SHE STATES THAT IT IS BEST TO CALL THEIR HOME PHONE -[phone]

## 2022-12-20 ENCOUNTER — TELEPHONE (OUTPATIENT)
Dept: CARDIOLOGY | Facility: CLINIC | Age: 52
End: 2022-12-20

## 2022-12-20 DIAGNOSIS — R00.2 PALPITATIONS: ICD-10-CM

## 2022-12-20 DIAGNOSIS — R06.02 SHORTNESS OF BREATH: ICD-10-CM

## 2022-12-20 DIAGNOSIS — R07.2 PRECORDIAL PAIN: ICD-10-CM

## 2022-12-20 DIAGNOSIS — I25.119 CORONARY ARTERY DISEASE INVOLVING NATIVE CORONARY ARTERY OF NATIVE HEART WITH ANGINA PECTORIS: ICD-10-CM

## 2022-12-20 NOTE — TELEPHONE ENCOUNTER
For the HUB to read to pt:         I called patient to inform him that Dr. Estrada will not see him due to liability purposes. Per Grzegorz Daugherty patient can be sent to CT surgery in [place]. I was unable to reach the patient to tell him this.

## 2023-01-04 ENCOUNTER — TELEPHONE (OUTPATIENT)
Dept: CARDIOLOGY | Facility: CLINIC | Age: 53
End: 2023-01-04
Payer: MEDICAID

## 2023-01-04 NOTE — TELEPHONE ENCOUNTER
Called and spoke with patient and advised if he starts having any new or worsening sx, chest pain he needed to proceed to ER asap. He verbally understood.    Per Grzegorz Daugherty,PAC Verbal  Please get records from [facility] to review before proceeding with a plan.    Can we please get records?

## 2023-01-04 NOTE — TELEPHONE ENCOUNTER
----- Message from Letty Sol MA sent at 1/3/2023  7:55 AM EST -----  Regarding: FW: Cardiothoracic Surgery Referral  Contact: [phone]  I am not sure if it is okay that I cancel his appointments given his medical history. Please review this   ----- Message -----  From: Ricardo Crespo  Sent: 12/29/2022   8:32 PM EST  To: Letty Sol MA  Subject: Cardiothoracic Surgery Referral                  diana Saenz.  i have an appt. with grzegorz on 1-5-2022 i guess it was to go over my appt. at [facility]  I saw Dr. Mchugh he told me i was very high risk due to cirrhosis and diabetes, told me to go back to dr. darby for another cath?  i just had one 30 days ago anyway nothing has changed with me same chest pain same exertion issues.  At this time i dont plan attend appt. on 1-5-2023 just tired been going on alot of years .   I appreciate very much Grzegorz Fisher, all the staff you all have went above and beyond for me and Staci.   Best of wishes to all of you.  Geo

## 2023-01-06 NOTE — TELEPHONE ENCOUNTER
PATIENT CAME INTO OFFICE YESTERDAY, AND SPOKE WITH DEMETRIUS BARRETT PERSONALLY.    PER DEMETRIUS BARRETT VERBAL PLEASE SCHEDULE WITH DR. CHO IN 1-2 WEEKS TO DISCUSS HIS TESTING.

## 2023-01-24 ENCOUNTER — OFFICE VISIT (OUTPATIENT)
Dept: CARDIOLOGY | Facility: CLINIC | Age: 53
End: 2023-01-24
Payer: MEDICAID

## 2023-01-24 VITALS
HEIGHT: 72 IN | WEIGHT: 279.4 LBS | HEART RATE: 81 BPM | OXYGEN SATURATION: 96 % | DIASTOLIC BLOOD PRESSURE: 70 MMHG | BODY MASS INDEX: 37.84 KG/M2 | SYSTOLIC BLOOD PRESSURE: 112 MMHG

## 2023-01-24 DIAGNOSIS — I10 ESSENTIAL HYPERTENSION: ICD-10-CM

## 2023-01-24 DIAGNOSIS — R42 DIZZINESS: ICD-10-CM

## 2023-01-24 DIAGNOSIS — I48.0 PAROXYSMAL ATRIAL FIBRILLATION: ICD-10-CM

## 2023-01-24 DIAGNOSIS — R06.02 SHORTNESS OF BREATH: ICD-10-CM

## 2023-01-24 DIAGNOSIS — Z99.89 OSA ON CPAP: ICD-10-CM

## 2023-01-24 DIAGNOSIS — G47.33 OSA ON CPAP: ICD-10-CM

## 2023-01-24 DIAGNOSIS — E78.5 HYPERLIPIDEMIA LDL GOAL <70: ICD-10-CM

## 2023-01-24 DIAGNOSIS — R07.2 PRECORDIAL PAIN: Primary | ICD-10-CM

## 2023-01-24 DIAGNOSIS — R00.2 PALPITATION: ICD-10-CM

## 2023-01-24 DIAGNOSIS — I50.32 CHRONIC DIASTOLIC CONGESTIVE HEART FAILURE: ICD-10-CM

## 2023-01-24 DIAGNOSIS — I25.10 CORONARY ARTERY DISEASE INVOLVING NATIVE CORONARY ARTERY OF NATIVE HEART WITHOUT ANGINA PECTORIS: ICD-10-CM

## 2023-01-24 PROCEDURE — 99213 OFFICE O/P EST LOW 20 MIN: CPT | Performed by: INTERNAL MEDICINE

## 2023-01-24 RX ORDER — TIRZEPATIDE 5 MG/.5ML
INJECTION, SOLUTION SUBCUTANEOUS WEEKLY
COMMUNITY
Start: 2023-01-03 | End: 2023-02-15 | Stop reason: DRUGHIGH

## 2023-01-24 RX ORDER — CLOPIDOGREL BISULFATE 75 MG/1
75 TABLET ORAL DAILY
Qty: 30 TABLET | Refills: 11 | Status: SHIPPED | OUTPATIENT
Start: 2023-01-24

## 2023-01-24 RX ORDER — NITROGLYCERIN 40 MG/1
1 PATCH TRANSDERMAL SEE ADMIN INSTRUCTIONS
Qty: 30 PATCH | Refills: 11 | Status: SHIPPED | OUTPATIENT
Start: 2023-01-24

## 2023-01-24 RX ORDER — INSULIN LISPRO 100 [IU]/ML
INJECTION, SOLUTION INTRAVENOUS; SUBCUTANEOUS
COMMUNITY
Start: 2023-01-12

## 2023-01-24 NOTE — PROGRESS NOTES
Subjective   Ricardo Crespo is a 52 y.o. male     Chief Complaint   Patient presents with   • Follow-up     Here to discuss possible intervention   • Coronary Artery Disease   • Hyperlipidemia   • Hypertension   • Atrial Fibrillation   • Chest Pain   • Shortness of Breath       PROBLEM LIST:     1. Coronary artery disease  1.1. Stenting of the LAD, 11/2015, per Dr. Moss.  1.2. Repeat catheterization 2-3 weeks after stenting as above, indicating patent stent with 40% PDA stenosis. Medical management was recommended.  1.3. Repeat catheterization, 01/2016, indicating nonobstructive disease with patent stent. Medical management was recommended.  1.4.  Stenting of the PDA, 03/2017, in the setting of low-level symptoms and abnormal stress test.  Previous stent to the LAD was patent.  The patient had nonobstructive disease otherwise.  Medical management was recommended.  1.5.  Stenting of the mid RCA and PTCA only of the distal LAD, 09/17.  1.6.  Repeat LHC, 11/17, in the setting of acute chest pain.  The patient had 50% LAD stenosis, patent stents, and non-obstructive CAD otherwise.  1. 7 repeat left heart catheter March 2018 with stenting of the LAD ×2.  Nonobstructive disease otherwise and patent stent with medical management recommended  1.8 left heart cath 10/16/18-30-40% LAD, 40% apical branch, 10% circumflex left, right coronary artery 10%, EF 60%  1.9 LHC, 2-8-19, EF 55-60%, demonstrated widely patent epicardial coronary arteries with diffuse non-obstructive disease with empiric therapy for ischemia and risk factor manag.  1.10 Repeat left heart catheterization, 9/2021, in the setting of low level symptoms compatible with angina/ischemia.  The patient had small vessel disease with nonobstructive disease noted otherwise.  No targets for intervention were noted.  Medical management was recommended.  Previously placed stents were patent.  1.11 Repeat heart catheterization, 4/2022 with eventual PTCA of the second  diagonal of the LAD and stenting of the recurrent apical branch of the LAD.  His previous catheterization had demonstrated the segments of disease, but because of persistent chest pain he eventually had to be returned for intervention.  2. Preserved systolic function  3. Hypertension  4. Dyslipidemia  5. Diabetes mellitus  6. Sleep apnea  7. 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 Atrial Fibrillation      Specialty Problems        Cardiology Problems    Coronary artery disease involving native coronary artery of native heart without angina pectoris        Essential hypertension        Hyperlipidemia LDL goal <70        Palpitation        Chronic diastolic congestive heart failure (HCC)        Paroxysmal atrial fibrillation (HCC)        Stable angina pectoris (HCC)        Unstable angina (HCC)             HPI:    Mr. Crespo returns for follow-up on the above.      He underwent cardiac catheterization at [facility] in the setting of acute coronary syndrome.  He was referred to [facility] in [place] for consideration for bypass.  Bypass was not performed.  He was scheduled to see CT surgery in [place] but that was not performed because of the patient's prior CT evaluation [facility].  Mr. Crespo was subsequently evaluated through the  where he was also felt not to be a candidate for bypass surgery.    The patient continues to have significant angina at low levels of physical activity.  He describes that he was much improved with nitroglycerin patch but that he ran out of that therapy and his symptoms of worsened.  Of note, he has had intermittent chest discomfort since his evaluation and treatment in early December.  He has no failure or dysrhythmia symptoms.                    PRIOR MEDICATIONS    Current Outpatient Medications on File Prior to Visit   Medication Sig Dispense Refill   • furosemide (LASIX) 40 MG tablet Take 40 mg by mouth 2 (Two) Times a Day.     • HumaLOG  100 UNIT/ML injection Per insulin pump     • lithium carbonate 300 MG capsule Take  by mouth 2 (two) times a day. 900 mg po qam and 600 mg qpm  2   • Mounjaro 5 MG/0.5ML solution pen-injector 1 (One) Time Per Week.     • sertraline (ZOLOFT) 100 MG tablet Take 200 mg by mouth Daily.  2   • nitroglycerin (NITROSTAT) 0.4 MG SL tablet PLACE 1 TABLET UNDER THE TONGUE EVERY 5 MINUTES UP TO 3 TABLETS IN 15 MINUTES FOR CHEST PAIN. IF NO RELIEF GO TO THE EMERGENCY ROOM OR CALL 25 tablet 3   • [DISCONTINUED] apixaban (ELIQUIS) 5 MG tablet tablet Take 1 tablet by mouth 2 (Two) Times a Day. 60 tablet 11   • [DISCONTINUED] clopidogrel (PLAVIX) 75 MG tablet TAKE 1 TABLET ONCE A DAY TO PREVENT CLOTTING 30 tablet 11   • [DISCONTINUED] Insulin Infusion Pump device humalog     • [DISCONTINUED] ketorolac (TORADOL) 10 MG tablet Take 1 tablet by mouth Every 6 (Six) Hours As Needed for Moderate Pain . 16 tablet 0   • [DISCONTINUED] promethazine (PHENERGAN) 25 MG tablet Take 1 tablet by mouth Every 12 (Twelve) Hours As Needed for Nausea. 20 tablet 0   • [DISCONTINUED] sildenafil (VIAGRA) 100 MG tablet Take 1/4 TO 1 TABLET DAILY AS NEEDED 30 minutes prior TO intercourse     • [DISCONTINUED] tamsulosin (FLOMAX) 0.4 MG capsule 24 hr capsule Take 1 capsule by mouth Daily. 30 capsule 1     No current facility-administered medications on file prior to visit.       ALLERGIES:    Beta adrenergic blockers, Calcium channel blockers, and Imdur [isosorbide dinitrate]    PAST MEDICAL HISTORY:    Past Medical History:   Diagnosis Date   • Abnormal ECG    • Alcohol liver damage (HCC)    • Anxiety    • Arrhythmia    • Atrial fibrillation (HCC)    • Benign hypertension    • Bipolar depression (HCC)    • CHF (congestive heart failure) (HCC)    • Cirrhosis of liver (HCC)    • Clotting disorder (HCC)    • Coronary artery disease    • DVT (deep venous thrombosis) (HCC)     Questionable history of deep venous thrombosis.   • Dyslipidemia    • Hyperlipidemia     • Myocardial infarction (HCC)     x 2   • Obesity    • JIM on CPAP     Obstructive sleep apnea, compliant with CPAP.    • Type 2 diabetes mellitus (HCC)    • Valvular disease        SURGICAL HISTORY:    Past Surgical History:   Procedure Laterality Date   • ANAL FISTULA REPAIR     • CARDIAC CATHETERIZATION     • CARDIAC CATHETERIZATION     • CARDIAC CATHETERIZATION N/A 1/29/2021    Procedure: Left Heart Cath;  Surgeon: Adam Cortez MD;  Location:  [facility];  Service: Cardiology;  Laterality: N/A;   • CARDIAC CATHETERIZATION N/A 9/24/2021    Procedure: LEFT HEART CATH;  Surgeon: Juvencio Welch MD;  Location:  [facility];  Service: Cardiovascular;  Laterality: N/A;   • CAROTID STENT     • CHOLECYSTECTOMY     • CORONARY ANGIOPLASTY WITH STENT PLACEMENT Left 11/09/2015    Persistent cardiac symptoms with cardiac catheterization, Dr. Moss, with PTCA and stenting of the mid-LAD, 11/09/2015.   • GANGLION CYST EXCISION      left knee   • PYLOROMYOTOMY         SOCIAL HISTORY:    Social History     Socioeconomic History   • Marital status:    Tobacco Use   • Smoking status: Former     Packs/day: 1.00     Years: 15.00     Pack years: 15.00     Types: Cigarettes     Start date: 8/1/1985     Quit date: 2008     Years since quitting: 15.0   • Smokeless tobacco: Never   • Tobacco comments:     Quit 12 years ago; smoked 17 years at 1 PPD   Substance and Sexual Activity   • Alcohol use: No     Comment: Quit 12 years ago; drank a fifth a day for 20 years   • Drug use: No   • Sexual activity: Yes     Partners: Female     Birth control/protection: None       FAMILY HISTORY:    Family History   Problem Relation Age of Onset   • Heart disease Father         CAD, 25 year old   • Nephrolithiasis Father    • Heart attack Father    • Atrial fibrillation Mother    • Arrhythmia Mother    • No Known Problems Sister        Review of Systems   Constitutional: Positive for  "fatigue (easily).   HENT: Negative.    Eyes: Positive for visual disturbance (glasses prn).   Respiratory: Positive for shortness of breath.    Cardiovascular: Positive for chest pain (worse with exertion), palpitations (HX a-fib) and leg swelling (rt. ).   Gastrointestinal: Negative.    Endocrine: Negative.    Genitourinary: Negative.    Musculoskeletal: Positive for arthralgias and myalgias.   Skin: Negative.    Allergic/Immunologic: Negative.    Neurological: Positive for light-headedness. Negative for syncope.   Hematological: Bruises/bleeds easily.   Psychiatric/Behavioral: Negative.        VISIT VITALS:  Vitals:    01/24/23 1110   BP: 112/70   BP Location: Left arm   Patient Position: Sitting   Pulse: 81   SpO2: 96%   Weight: 127 kg (279 lb 6.4 oz)   Height: 182.9 cm (72.01\")      /70 (BP Location: Left arm, Patient Position: Sitting)   Pulse 81   Ht 182.9 cm (72.01\")   Wt 127 kg (279 lb 6.4 oz)   SpO2 96%   BMI 37.89 kg/m²     RECENT LABS:    Objective       Physical Exam    Procedures      Assessment & Plan   #1.  Coronary artery disease.  The patient has persistent angina was not felt a candidate for bypass surgery.  We will restart nitroglycerin patch and I would like to review the patient's films from [facility] and discuss his case with Dr. Gibson.  We will restart Plavix in the interim.    2.  History of DVT, pulmonary embolism, and history of paroxysmal atrial fibrillation.  We will restart Eliquis.    3.  Dyslipidemia.  The patient is not on statin or PCSK9 inhibitor therapy.  That will need to be addressed.    4.  We will contact Mr. Crespo after I have been able to review his films to determine if and the mechanical revascularization is feasible.  He will follow with Dr. Bush as instructed.   Diagnosis Plan   1. Precordial pain        2. Chronic diastolic congestive heart failure (HCC)        3. Coronary artery disease involving native coronary artery of native heart " without angina pectoris        4. Essential hypertension        5. Hyperlipidemia LDL goal <70        6. Palpitation        7. Paroxysmal atrial fibrillation (HCC)        8. Shortness of breath        9. JIM on CPAP        10. Dizziness            No follow-ups on file.         Ricardo Crespo  reports that he quit smoking about 15 years ago. His smoking use included cigarettes. He started smoking about 37 years ago. He has a 15.00 pack-year smoking history. He has never used smokeless tobacco.. I have educated him on the risk of diseases from using tobacco products such as cancer, COPD and heart disease.               Class 2 Severe Obesity (BMI >=35 and <=39.9). Obesity-related health conditions include the following: obstructive sleep apnea, hypertension, coronary heart disease, diabetes mellitus, dyslipidemias and a-fib. Obesity is to be assessed at today's visit. BMI is pcp addressing. We discussed portion control and increasing exercise.             Electronically signed by:    Scribed for Leo Ramirez MD by Taya Gonzalez LPN on January 24, 2023  at 11:18 EST    I, Leo Ramirez MD personally performed the services described in this documentation as scribed by the above named individual in my presence, and it is both accurate and complete. January 24, 2023 11:18 EST      Dictated Utilizing Dragon Dictation: Part of this note may be an electronic transcription/translation of spoken language to printed text using the Dragon Dictation System.

## 2023-02-15 ENCOUNTER — OFFICE VISIT (OUTPATIENT)
Dept: CARDIOLOGY | Facility: CLINIC | Age: 53
End: 2023-02-15
Payer: MEDICAID

## 2023-02-15 VITALS
SYSTOLIC BLOOD PRESSURE: 108 MMHG | HEART RATE: 80 BPM | HEIGHT: 72 IN | OXYGEN SATURATION: 98 % | WEIGHT: 277 LBS | BODY MASS INDEX: 37.52 KG/M2 | DIASTOLIC BLOOD PRESSURE: 72 MMHG

## 2023-02-15 DIAGNOSIS — R42 DIZZINESS: ICD-10-CM

## 2023-02-15 DIAGNOSIS — R00.2 PALPITATION: ICD-10-CM

## 2023-02-15 DIAGNOSIS — G47.33 OSA ON CPAP: ICD-10-CM

## 2023-02-15 DIAGNOSIS — I50.32 CHRONIC DIASTOLIC CONGESTIVE HEART FAILURE: ICD-10-CM

## 2023-02-15 DIAGNOSIS — Z99.89 OSA ON CPAP: ICD-10-CM

## 2023-02-15 DIAGNOSIS — I25.10 CORONARY ARTERY DISEASE INVOLVING NATIVE CORONARY ARTERY OF NATIVE HEART WITHOUT ANGINA PECTORIS: ICD-10-CM

## 2023-02-15 DIAGNOSIS — I10 ESSENTIAL HYPERTENSION: ICD-10-CM

## 2023-02-15 DIAGNOSIS — R07.2 PRECORDIAL PAIN: Primary | ICD-10-CM

## 2023-02-15 DIAGNOSIS — E78.5 HYPERLIPIDEMIA LDL GOAL <70: ICD-10-CM

## 2023-02-15 DIAGNOSIS — R06.02 SHORTNESS OF BREATH: ICD-10-CM

## 2023-02-15 DIAGNOSIS — I48.0 PAF (PAROXYSMAL ATRIAL FIBRILLATION): ICD-10-CM

## 2023-02-15 PROBLEM — I20.8 STABLE ANGINA PECTORIS (HCC): Status: RESOLVED | Noted: 2021-01-27 | Resolved: 2023-02-15

## 2023-02-15 PROBLEM — I20.0 UNSTABLE ANGINA (HCC): Status: RESOLVED | Noted: 2021-09-24 | Resolved: 2023-02-15

## 2023-02-15 PROBLEM — I20.89 STABLE ANGINA PECTORIS: Status: RESOLVED | Noted: 2021-01-27 | Resolved: 2023-02-15

## 2023-02-15 PROCEDURE — 99213 OFFICE O/P EST LOW 20 MIN: CPT | Performed by: INTERNAL MEDICINE

## 2023-02-15 RX ORDER — TIRZEPATIDE 7.5 MG/.5ML
INJECTION, SOLUTION SUBCUTANEOUS WEEKLY
COMMUNITY
Start: 2023-01-27

## 2023-02-15 RX ORDER — POTASSIUM CHLORIDE 20 MEQ/1
TABLET, EXTENDED RELEASE ORAL 2 TIMES DAILY
COMMUNITY

## 2023-02-15 NOTE — PROGRESS NOTES
Subjective   Ricardo Crespo is a 52 y.o. male     Chief Complaint   Patient presents with   • Follow-up     Here for 2-3 week f/u   • Coronary Artery Disease   • Hyperlipidemia   • Hypertension   • Atrial Fibrillation   • Congestive Heart Failure   • Shortness of Breath   • Chest Pain       PROBLEM LIST:     1. Coronary artery disease  1.1. Stenting of the LAD, 11/2015, per Dr. Moss.  1.2. Repeat catheterization 2-3 weeks after stenting as above, indicating patent stent with 40% PDA stenosis. Medical management was recommended.  1.3. Repeat catheterization, 01/2016, indicating nonobstructive disease with patent stent. Medical management was recommended.  1.4.  Stenting of the PDA, 03/2017, in the setting of low-level symptoms and abnormal stress test.  Previous stent to the LAD was patent.  The patient had nonobstructive disease otherwise.  Medical management was recommended.  1.5.  Stenting of the mid RCA and PTCA only of the distal LAD, 09/17.  1.6.  Repeat LHC, 11/17, in the setting of acute chest pain.  The patient had 50% LAD stenosis, patent stents, and non-obstructive CAD otherwise.  1. 7 repeat left heart catheter March 2018 with stenting of the LAD ×2.  Nonobstructive disease otherwise and patent stent with medical management recommended  1.8 left heart cath 10/16/18-30-40% LAD, 40% apical branch, 10% circumflex left, right coronary artery 10%, EF 60%  1.9 LHC, 2-8-19, EF 55-60%, demonstrated widely patent epicardial coronary arteries with diffuse non-obstructive disease with empiric therapy for ischemia and risk factor manag.  1.10 Repeat left heart catheterization, 9/2021, in the setting of low level symptoms compatible with angina/ischemia.  The patient had small vessel disease with nonobstructive disease noted otherwise.  No targets for intervention were noted.  Medical management was recommended.  Previously placed stents were patent.  1.11 Repeat heart catheterization, 4/2022 with eventual PTCA  of the second diagonal of the LAD and stenting of the recurrent apical branch of the LAD.  His previous catheterization had demonstrated the segments of disease, but because of persistent chest pain he eventually had to be returned for intervention.  2. Preserved systolic function  3. Hypertension  4. Dyslipidemia  5. Diabetes mellitus  6. Sleep apnea  7. 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 Atrial Fibrillation      Specialty Problems        Cardiology Problems    Coronary artery disease involving native coronary artery of native heart without angina pectoris        Essential hypertension        Hyperlipidemia LDL goal <70        Palpitation        Chronic diastolic congestive heart failure (HCC)        Paroxysmal atrial fibrillation (HCC)        Stable angina pectoris (HCC)        Unstable angina (HCC)             HPI:  Mr. Crespo returns for follow-up on the above.    He restarted topical nitrates and has had significant improvement in angina.  However, he describes that he continues to have significant angina during the 12 hours when he is off that medication.  He has left parasternal chest pain which radiates into his back and into his left neck and jaw.  Symptoms are predominantly exertional.    He also describes more persistent chest discomfort which is improved with nonsteroidal anti-inflammatory agents.  After reviewing many of the patient's past medical records and from prior interviews I concur with Dr. Gibson's assessment that a significant portion of the patient's symptoms relate to chronic anxiety and somatization.    The patient was not felt a candidate for bypass surgery at [facility] as his symptoms were not all felt to be ischemic in nature because of the patient's known cirrhosis.  Additionally, they described they did not feel as LAD disease was significant enough to warrant mechanical intervention although the patient had hemodynamically significant stenosis by flow reserve calculations.   Mr. Crespo was not seen by CV surgery at [facility] in [place] because of his previous issues with surgery at [facility] as well as [facility].                        PRIOR MEDICATIONS    Current Outpatient Medications on File Prior to Visit   Medication Sig Dispense Refill   • apixaban (ELIQUIS) 5 MG tablet tablet Take 1 tablet by mouth 2 (Two) Times a Day. 60 tablet 11   • clopidogrel (PLAVIX) 75 MG tablet Take 1 tablet by mouth Daily. 30 tablet 11   • furosemide (LASIX) 40 MG tablet Take 40 mg by mouth 2 (Two) Times a Day.     • HumaLOG 100 UNIT/ML injection Per insulin pump     • lithium carbonate 300 MG capsule Take  by mouth 2 (two) times a day. 900 mg po qam and 600 mg qpm  2   • Mounjaro 7.5 MG/0.5ML solution pen-injector 1 (One) Time Per Week.     • nitroglycerin (NITRODUR) 0.2 MG/HR patch Place 1 patch on the skin as directed by provider See Admin Instructions. Apply patch daily, remove at night for at least 12 hours 30 patch 11   • potassium chloride (K-DUR,KLOR-CON) 20 MEQ CR tablet 2 (Two) Times a Day.     • sertraline (ZOLOFT) 100 MG tablet Take 200 mg by mouth Daily.  2   • nitroglycerin (NITROSTAT) 0.4 MG SL tablet PLACE 1 TABLET UNDER THE TONGUE EVERY 5 MINUTES UP TO 3 TABLETS IN 15 MINUTES FOR CHEST PAIN. IF NO RELIEF GO TO THE EMERGENCY ROOM OR CALL 25 tablet 3   • [DISCONTINUED] Mounjaro 5 MG/0.5ML solution pen-injector 1 (One) Time Per Week.       No current facility-administered medications on file prior to visit.       ALLERGIES:    Beta adrenergic blockers, Calcium channel blockers, and Imdur [isosorbide dinitrate]    PAST MEDICAL HISTORY:    Past Medical History:   Diagnosis Date   • Abnormal ECG    • Alcohol liver damage (HCC)    • Anxiety    • Arrhythmia    • Atrial fibrillation (HCC)    • Benign hypertension    • Bipolar depression (HCC)    • CHF (congestive heart failure) (HCC)    • Cirrhosis of liver (HCC)    • Clotting disorder (HCC)    • Coronary artery  disease    • DVT (deep venous thrombosis) (HCC)     Questionable history of deep venous thrombosis.   • Dyslipidemia    • Hyperlipidemia    • Myocardial infarction (HCC)     x 2   • Obesity    • JIM on CPAP     Obstructive sleep apnea, compliant with CPAP.    • Type 2 diabetes mellitus (HCC)    • Valvular disease        SURGICAL HISTORY:    Past Surgical History:   Procedure Laterality Date   • ANAL FISTULA REPAIR     • CARDIAC CATHETERIZATION     • CARDIAC CATHETERIZATION     • CARDIAC CATHETERIZATION N/A 1/29/2021    Procedure: Left Heart Cath;  Surgeon: Adam Cortez MD;  Location:  [facility];  Service: Cardiology;  Laterality: N/A;   • CARDIAC CATHETERIZATION N/A 9/24/2021    Procedure: LEFT HEART CATH;  Surgeon: Juvencio Welch MD;  Location:  [facility];  Service: Cardiovascular;  Laterality: N/A;   • CAROTID STENT     • CHOLECYSTECTOMY     • CORONARY ANGIOPLASTY WITH STENT PLACEMENT Left 11/09/2015    Persistent cardiac symptoms with cardiac catheterization, Dr. Moss, with PTCA and stenting of the mid-LAD, 11/09/2015.   • GANGLION CYST EXCISION      left knee   • PYLOROMYOTOMY         SOCIAL HISTORY:    Social History     Socioeconomic History   • Marital status:    Tobacco Use   • Smoking status: Former     Packs/day: 1.00     Years: 15.00     Pack years: 15.00     Types: Cigarettes     Start date: 8/1/1985     Quit date: 2008     Years since quitting: 15.1   • Smokeless tobacco: Never   • Tobacco comments:     Quit 12 years ago; smoked 17 years at 1 PPD   Substance and Sexual Activity   • Alcohol use: No     Comment: Quit 12 years ago; drank a fifth a day for 20 years   • Drug use: No   • Sexual activity: Yes     Partners: Female     Birth control/protection: None       FAMILY HISTORY:    Family History   Problem Relation Age of Onset   • Heart disease Father         CAD, 25 year old   • Nephrolithiasis Father    • Heart attack Father    •  "Atrial fibrillation Mother    • Arrhythmia Mother    • No Known Problems Sister        Review of Systems   Constitutional: Positive for fatigue.   HENT: Negative.    Eyes: Positive for visual disturbance (glasses prn).   Respiratory: Positive for shortness of breath.    Cardiovascular: Positive for chest pain and leg swelling. Negative for palpitations.   Gastrointestinal: Negative.    Endocrine: Negative.    Genitourinary: Negative.    Musculoskeletal: Negative.    Skin: Negative.    Allergic/Immunologic: Negative.    Neurological: Positive for dizziness. Negative for syncope.   Hematological: Negative.    Psychiatric/Behavioral: Negative.        VISIT VITALS:  Vitals:    02/15/23 1135   BP: 108/72   BP Location: Left arm   Patient Position: Sitting   Pulse: 80   SpO2: 98%   Weight: 126 kg (277 lb)   Height: 182.9 cm (72.01\")      /72 (BP Location: Left arm, Patient Position: Sitting)   Pulse 80   Ht 182.9 cm (72.01\")   Wt 126 kg (277 lb)   SpO2 98%   BMI 37.56 kg/m²     RECENT LABS:    Objective       Physical Exam    Procedures      Assessment & Plan   #1.  Coronary artery disease.  Mr. Crespo has had improvement in his symptoms with topical nitrates but we are limited in additional to anginal therapy because of previously demonstrated intolerance to certain medications as well as relatively low blood pressure.  He has a high degree of anxiety related to his cardiovascular disease and some of his symptoms I feel are clearly not ischemic in nature.  However, he does have hemodynamically significant LAD disease and refractory angina and would benefit from surgical revascularization if risk was not prohibitive.  In that regard we will attempt to have him evaluated by CT surgery again with regard to the possibility of LIMA to LAD bypass.  We will continue current medications in the interim.    2.  We will follow with Mr. Crespo telephonically once we have make contact with surgery, he will contact us on " appearing basis for any chance of symptoms and the patient was again informed to activate emergency medical services or report to the emergency room for any chest pain not rapidly relieved by nitroglycerin.    3.  Mr. Crespo will follow with Dr. Bush as instructed and in our office as above or on appearing basis.   Diagnosis Plan   1. Precordial pain        2. Chronic diastolic congestive heart failure (HCC)        3. Coronary artery disease involving native coronary artery of native heart without angina pectoris        4. Essential hypertension        5. Hyperlipidemia LDL goal <70        6. Palpitation        7. PAF (paroxysmal atrial fibrillation) (HCC)        8. Shortness of breath        9. JIM on CPAP        10. Dizziness            No follow-ups on file.         Ricardo Crespo  reports that he quit smoking about 15 years ago. His smoking use included cigarettes. He started smoking about 37 years ago. He has a 15.00 pack-year smoking history. He has never used smokeless tobacco.. I have educated him on the risk of diseases from using tobacco products such as cancer, COPD and heart disease.               Class 2 Severe Obesity (BMI >=35 and <=39.9). Obesity-related health conditions include the following: obstructive sleep apnea, hypertension, coronary heart disease, diabetes mellitus, dyslipidemias and DVT/PE, A-Fib. Obesity is unchanged. BMI is pcp addressing. We discussed portion control and increasing exercise.             Electronically signed by:    Scribed for Leo Ramirez MD by Taya Gonzalez LPN on February 15, 2023  at 11:40 EST    I, Leo Ramirez MD personally performed the services described in this documentation as scribed by the above named individual in my presence, and it is both accurate and complete. February 15, 2023 11:40 EST      Dictated Utilizing Dragon Dictation: Part of this note may be an electronic transcription/translation of spoken language to printed text using the  [facility] Dictation System.

## 2023-02-16 ENCOUNTER — DOCUMENTATION (OUTPATIENT)
Dept: CARDIOLOGY | Facility: CLINIC | Age: 53
End: 2023-02-16
Payer: MEDICAID

## 2023-02-16 NOTE — PROGRESS NOTES
PER DR. CHO AT VISIT YEST. :In that regard we will attempt to have him evaluated by CT surgery again with regard to the possibility of LIMA to LAD bypass. WHILE PATIENT WAS HERE I SPOKE WITH LILIAN AT CARDIOVASCULAR AND THORACIC SURGERY AT [facility] IN [place], PHONE [phone]. CABG SURGEONS THEY HAVE ARE: DR. ARGELIA JOSHI AND CORNELL DOAN. PER LILIAN JUST SEND FAX COVER SHEET WITH NP REFERRAL TO FAX NUMBER [phone] AND REFERRAL WILL GO TO [place] REFERRAL COORD. AND WITHIN 2-3 DAYS, USUAL TURNAROUND FOR REFERRAL. THEY WILL WANT TO SEE PATIENT IN THE OFFICE FIRST. ALL ABOVE INFO. GIVEN TO PALLAVI ADLER, PAC STAFF TO SEND RECENT CATH FILMS TO THEM ALONG WITH RECORDS. PH,LPN

## 2023-03-30 ENCOUNTER — HOSPITAL ENCOUNTER (INPATIENT)
Facility: HOSPITAL | Age: 53
LOS: 1 days | Discharge: LEFT BEFORE DISCHARGE | DRG: 948 | End: 2023-03-30
Attending: INTERNAL MEDICINE | Admitting: INTERNAL MEDICINE
Payer: MEDICAID

## 2023-03-31 NOTE — H&P
Patient got here on the unit, decided he did not want to stay, he left, I never saw the patient before no H&P was done.

## 2023-04-03 ENCOUNTER — TELEPHONE (OUTPATIENT)
Dept: CARDIOLOGY | Facility: CLINIC | Age: 53
End: 2023-04-03
Payer: MEDICAID

## 2023-04-03 NOTE — TELEPHONE ENCOUNTER
I spoke with Pat she states that Dr Ramirez does not call in any pain medication at all. She advised that he can try to see if his PCP could call in meds. I called patient back and advised of above.      Sabrina MENARD

## 2023-04-03 NOTE — TELEPHONE ENCOUNTER
Patient had CABG done over the weekend in [place] . He was not able to fill RX it is a written RX and they told him they could not fill it due to it being written in [place] . He said that he would like for us to Dr Nam at [phone] and he will verify what patient was given.       Sabrina MENARD

## 2023-04-04 ENCOUNTER — HOSPITAL ENCOUNTER (OUTPATIENT)
Dept: GENERAL RADIOLOGY | Facility: HOSPITAL | Age: 53
Discharge: HOME | End: 2023-04-04
Admitting: NURSE PRACTITIONER
Payer: MEDICAID

## 2023-04-04 ENCOUNTER — TRANSCRIBE ORDERS (OUTPATIENT)
Dept: LAB | Facility: HOSPITAL | Age: 53
End: 2023-04-04
Payer: MEDICAID

## 2023-04-04 DIAGNOSIS — R06.02 SOB (SHORTNESS OF BREATH): Primary | ICD-10-CM

## 2023-04-04 DIAGNOSIS — R06.02 SOB (SHORTNESS OF BREATH): ICD-10-CM

## 2023-04-04 PROCEDURE — 71046 X-RAY EXAM CHEST 2 VIEWS: CPT | Performed by: RADIOLOGY

## 2023-04-04 PROCEDURE — 71046 X-RAY EXAM CHEST 2 VIEWS: CPT

## 2023-05-03 ENCOUNTER — OFFICE VISIT (OUTPATIENT)
Dept: CARDIOLOGY | Facility: CLINIC | Age: 53
End: 2023-05-03
Payer: MEDICAID

## 2023-05-03 VITALS
HEART RATE: 94 BPM | HEIGHT: 72 IN | WEIGHT: 264 LBS | BODY MASS INDEX: 35.76 KG/M2 | SYSTOLIC BLOOD PRESSURE: 115 MMHG | OXYGEN SATURATION: 97 % | DIASTOLIC BLOOD PRESSURE: 77 MMHG

## 2023-05-03 DIAGNOSIS — I48.0 PAF (PAROXYSMAL ATRIAL FIBRILLATION): ICD-10-CM

## 2023-05-03 DIAGNOSIS — Z98.890 S/P LEFT ATRIAL APPENDAGE LIGATION: ICD-10-CM

## 2023-05-03 DIAGNOSIS — I25.10 CORONARY ARTERY DISEASE INVOLVING NATIVE CORONARY ARTERY OF NATIVE HEART WITHOUT ANGINA PECTORIS: ICD-10-CM

## 2023-05-03 DIAGNOSIS — Z99.89 OSA ON CPAP: ICD-10-CM

## 2023-05-03 DIAGNOSIS — I10 ESSENTIAL HYPERTENSION: ICD-10-CM

## 2023-05-03 DIAGNOSIS — G47.33 OSA ON CPAP: ICD-10-CM

## 2023-05-03 DIAGNOSIS — Z95.1 S/P CABG (CORONARY ARTERY BYPASS GRAFT): Primary | ICD-10-CM

## 2023-05-03 DIAGNOSIS — E78.5 HYPERLIPIDEMIA LDL GOAL <70: ICD-10-CM

## 2023-05-03 NOTE — PROGRESS NOTES
Subjective   Follow up, Post CABG  Ricardo Crespo is a 52 y.o. male who presents to day for Follow-up (Open heart Surgery).    CHIEF COMPLIANT  Chief Complaint   Patient presents with   • Follow-up     Open heart Surgery       Active Problems:  Problem List Items Addressed This Visit        Cardiac and Vasculature    Coronary artery disease involving native coronary artery of native heart without angina pectoris    Overview     · Cardiac catheterization by Dr. Moss (11/9/2015):  PCI of mid-LAD  · Repeat cardiac catheterization for persistent chest pain symptoms (11/23/2015): Patent LAD stent.  Moderate PDA disease.  Normal LVEF.  Medical therapy recommended  · Cardiac catheterization for recurrent chest pain by Timo Louis (1/2016):  No obstructive disease. Normal LVEF.  · Echocardiogram (2/23/2017): EF 50-55%.  · Pharmacologic nuclear stress (2/23/17): Inferior ischemia. TID present  · Cardiac catheterization by Michael Ramirez (9/17/17): PCI of the apical LAD. Mid RCA 70% stenosis. Normal LVEF.  · Cardiac catheterization for recurrent angina by Dr. Bee (11/20/17): Essentially normal coronary arteries with the exception of moderate disease of the first diagonal ostium. Patent LAD stent.         Relevant Orders    Duplex Venous Upper Extremity - Right CAR    Essential hypertension    Hyperlipidemia LDL goal <70    Overview     · High intensity statin therapy indicated given the presence coronary disease         PAF (paroxysmal atrial fibrillation)    S/P CABG (coronary artery bypass graft) - Primary    S/P left atrial appendage ligation       Sleep    JIM on CPAP    Overview     Obstructive sleep apnea, compliant with CPAP.             HPI  HPI  Patient underwent successful single-vessel bypass surgery with LIMA to the LAD since then he has been doing well the only complication after the surgery had a fall and with one of the wires was broken but this has healed now he is doing much better on it she also had a  PICC line on the right axilla and it has been swollen and hurting since the surgery otherwise has no chest pain no significant shortness of breath he has chronic lower extremity edema which has not changed much he is denying any palpitations he has been using the CPAP every night he is also been through the rehab postoperatively  PRIOR MEDS  Current Outpatient Medications on File Prior to Visit   Medication Sig Dispense Refill   • furosemide (LASIX) 40 MG tablet Take 1 tablet by mouth 2 (Two) Times a Day.     • HumaLOG 100 UNIT/ML injection Per insulin pump     • lithium carbonate 300 MG capsule Take  by mouth 2 (two) times a day. 900 mg po qam and 600 mg qpm  2   • Mounjaro 7.5 MG/0.5ML solution pen-injector 1 (One) Time Per Week.     • nitroglycerin (NITRODUR) 0.2 MG/HR patch Place 1 patch on the skin as directed by provider See Admin Instructions. Apply patch daily, remove at night for at least 12 hours 30 patch 11   • nitroglycerin (NITROSTAT) 0.4 MG SL tablet PLACE 1 TABLET UNDER THE TONGUE EVERY 5 MINUTES UP TO 3 TABLETS IN 15 MINUTES FOR CHEST PAIN. IF NO RELIEF GO TO THE EMERGENCY ROOM OR CALL 25 tablet 3   • potassium chloride (K-DUR,KLOR-CON) 20 MEQ CR tablet 2 (Two) Times a Day.     • sertraline (ZOLOFT) 100 MG tablet Take 2 tablets by mouth Daily.  2   • [DISCONTINUED] apixaban (ELIQUIS) 5 MG tablet tablet Take 1 tablet by mouth 2 (Two) Times a Day. 60 tablet 11   • [DISCONTINUED] clopidogrel (PLAVIX) 75 MG tablet Take 1 tablet by mouth Daily. 30 tablet 11     No current facility-administered medications on file prior to visit.       ALLERGIES  Beta adrenergic blockers, Calcium channel blockers, Imdur [isosorbide dinitrate], and Statins    HISTORY  Past Medical History:   Diagnosis Date   • Abnormal ECG    • Alcohol liver damage    • Anxiety    • Arrhythmia    • Atrial fibrillation    • Benign hypertension    • Bipolar depression    • CHF (congestive heart failure)    • Cirrhosis of liver    • Clotting  "disorder    • Coronary artery disease    • DVT (deep venous thrombosis)     Questionable history of deep venous thrombosis.   • Dyslipidemia    • Hyperlipidemia    • Myocardial infarction     x 2   • Obesity    • JIM on CPAP     Obstructive sleep apnea, compliant with CPAP.    • Type 2 diabetes mellitus    • Valvular disease        Social History     Socioeconomic History   • Marital status:    Tobacco Use   • Smoking status: Former     Packs/day: 1.00     Years: 15.00     Pack years: 15.00     Types: Cigarettes     Start date: 8/1/1985     Quit date: 2008     Years since quitting: 15.3   • Smokeless tobacco: Never   • Tobacco comments:     Quit 12 years ago; smoked 17 years at 1 PPD   Substance and Sexual Activity   • Alcohol use: No     Comment: Quit 12 years ago; drank a fifth a day for 20 years   • Drug use: No   • Sexual activity: Yes     Partners: Female     Birth control/protection: None       Family History   Problem Relation Age of Onset   • Heart disease Father         CAD, 25 year old   • Nephrolithiasis Father    • Heart attack Father    • Atrial fibrillation Mother    • Arrhythmia Mother    • No Known Problems Sister        Review of Systems    Objective     VITALS: /77 (BP Location: Left arm, Patient Position: Sitting, Cuff Size: Adult)   Pulse 94   Ht 182.9 cm (72\")   Wt 120 kg (264 lb)   SpO2 97%   BMI 35.80 kg/m²     LABS:   Lab Results (most recent)     None          IMAGING:   XR Chest PA & Lateral    Result Date: 4/4/2023  No radiographic evidence of acute cardiac or pulmonary disease.  This report was finalized on 4/4/2023 10:32 AM by Dr. Satya Adhikari MD.        EXAM:  Physical Exam  Constitutional:       Appearance: He is well-developed.   HENT:      Head: Normocephalic and atraumatic.   Eyes:      Pupils: Pupils are equal, round, and reactive to light.   Neck:      Thyroid: No thyromegaly.      Vascular: No JVD.   Cardiovascular:      Rate and Rhythm: Normal rate and regular " rhythm.      Chest Wall: PMI is not displaced.      Pulses: Normal pulses.      Heart sounds: Normal heart sounds, S1 normal and S2 normal. No murmur heard.    No friction rub. No gallop.      Comments: Soft 3* 5 cm swelling on the right axilla  Healed thoracotomy scar  Pulmonary:      Effort: Pulmonary effort is normal. No respiratory distress.      Breath sounds: Normal breath sounds. No stridor. No wheezing or rales.   Chest:      Chest wall: No tenderness.   Abdominal:      General: Bowel sounds are normal. There is no distension.      Palpations: Abdomen is soft. There is no mass.      Tenderness: There is no abdominal tenderness. There is no guarding or rebound.   Musculoskeletal:      Cervical back: Neck supple. No edema.   Skin:     General: Skin is warm and dry.      Coloration: Skin is not pale.      Findings: No erythema or rash.   Neurological:      Mental Status: He is alert and oriented to person, place, and time.      Cranial Nerves: No cranial nerve deficit.      Coordination: Coordination normal.   Psychiatric:         Behavior: Behavior normal.         Procedure   Procedures       Assessment & Plan     Diagnoses and all orders for this visit:    1. S/P CABG (coronary artery bypass graft) (Primary)    2. Coronary artery disease involving native coronary artery of native heart without angina pectoris  -     Duplex Venous Upper Extremity - Right CAR; Future    3. PAF (paroxysmal atrial fibrillation)    4. S/P left atrial appendage ligation    5. Essential hypertension    6. Hyperlipidemia LDL goal <70    7. JIM on CPAP    1.  Patient had successful LIMA graft to LAD he has been healing well however he had an area of swelling at the root of the neck which is soft positive PICC line somewhat to proceed with venous Doppler to rule out any significant hematoma or even deep vein thrombosis  2.  He had atrial appendage ligation he has history of atrial fibrillation but currently he is in sinus rhythm  3.   His blood pressure is well controlled we will continue current management  4.  He is using CPAP every night continue current management    Return in about 3 months (around 8/3/2023).                   MEDS ORDERED DURING VISIT:  No orders of the defined types were placed in this encounter.      As always, Rex Bush MD  I appreciate very much the opportunity to participate in the cardiovascular care of your patients. Please do not hesitate to call me with any questions with regards to Ricardo BENJA Crespo evaluation and management.         This document has been electronically signed by Kun Hayden MD  May 3, 2023 10:00 EDT    This note is dictated utilizing voice recognition software.

## 2023-05-03 NOTE — PROGRESS NOTES
Subjective   Ricardo Crespo is a 52 y.o. male     Chief Complaint   Patient presents with   • Follow-up     Open heart Surgery       HPI        Current Outpatient Medications   Medication Sig Dispense Refill   • furosemide (LASIX) 40 MG tablet Take 1 tablet by mouth 2 (Two) Times a Day.     • HumaLOG 100 UNIT/ML injection Per insulin pump     • lithium carbonate 300 MG capsule Take  by mouth 2 (two) times a day. 900 mg po qam and 600 mg qpm  2   • Mounjaro 7.5 MG/0.5ML solution pen-injector 1 (One) Time Per Week.     • nitroglycerin (NITRODUR) 0.2 MG/HR patch Place 1 patch on the skin as directed by provider See Admin Instructions. Apply patch daily, remove at night for at least 12 hours 30 patch 11   • nitroglycerin (NITROSTAT) 0.4 MG SL tablet PLACE 1 TABLET UNDER THE TONGUE EVERY 5 MINUTES UP TO 3 TABLETS IN 15 MINUTES FOR CHEST PAIN. IF NO RELIEF GO TO THE EMERGENCY ROOM OR CALL 25 tablet 3   • potassium chloride (K-DUR,KLOR-CON) 20 MEQ CR tablet 2 (Two) Times a Day.     • sertraline (ZOLOFT) 100 MG tablet Take 2 tablets by mouth Daily.  2     No current facility-administered medications for this visit.       Beta adrenergic blockers, Calcium channel blockers, Imdur [isosorbide dinitrate], and Statins    Past Medical History:   Diagnosis Date   • Abnormal ECG    • Alcohol liver damage    • Anxiety    • Arrhythmia    • Atrial fibrillation    • Benign hypertension    • Bipolar depression    • CHF (congestive heart failure)    • Cirrhosis of liver    • Clotting disorder    • Coronary artery disease    • DVT (deep venous thrombosis)     Questionable history of deep venous thrombosis.   • Dyslipidemia    • Hyperlipidemia    • Myocardial infarction     x 2   • Obesity    • JIM on CPAP     Obstructive sleep apnea, compliant with CPAP.    • Type 2 diabetes mellitus    • Valvular disease        Social History     Socioeconomic History   • Marital status:    Tobacco Use   • Smoking status: Former     Packs/day:  "1.00     Years: 15.00     Pack years: 15.00     Types: Cigarettes     Start date: 8/1/1985     Quit date: 2008     Years since quitting: 15.3   • Smokeless tobacco: Never   • Tobacco comments:     Quit 12 years ago; smoked 17 years at 1 PPD   Substance and Sexual Activity   • Alcohol use: No     Comment: Quit 12 years ago; drank a fifth a day for 20 years   • Drug use: No   • Sexual activity: Yes     Partners: Female     Birth control/protection: None       Family History   Problem Relation Age of Onset   • Heart disease Father         CAD, 25 year old   • Nephrolithiasis Father    • Heart attack Father    • Atrial fibrillation Mother    • Arrhythmia Mother    • No Known Problems Sister        Review of Systems   Constitutional: Negative.  Negative for activity change, appetite change, chills and fatigue.   Eyes: Negative.  Negative for visual disturbance.   Respiratory: Positive for apnea. Negative for cough, chest tightness, shortness of breath and wheezing.    Cardiovascular: Positive for leg swelling. Negative for chest pain and palpitations.   Gastrointestinal: Negative.  Negative for blood in stool.   Endocrine: Negative.  Negative for cold intolerance and heat intolerance.   Genitourinary: Negative.  Negative for hematuria.   Skin: Negative.  Negative for color change, rash and wound.   Allergic/Immunologic: Negative.  Negative for environmental allergies and food allergies.   Neurological: Negative.  Negative for dizziness, syncope, weakness, light-headedness, numbness and headaches.   Hematological: Negative.  Does not bruise/bleed easily.   Psychiatric/Behavioral: Negative.  Negative for sleep disturbance.       Objective     Vitals:    05/03/23 0858   BP: 115/77   BP Location: Left arm   Patient Position: Sitting   Cuff Size: Adult   Pulse: 94   SpO2: 97%   Weight: 120 kg (264 lb)   Height: 182.9 cm (72\")        /77 (BP Location: Left arm, Patient Position: Sitting, Cuff Size: Adult)   Pulse 94   " "Ht 182.9 cm (72\")   Wt 120 kg (264 lb)   SpO2 97%   BMI 35.80 kg/m²      Lab Results (most recent)     None          Physical Exam    Procedure   Procedures         Assessment & Plan     No diagnosis found.             Patient did not bring med list or medicine bottles to appointment, med list has been reviewed and updated based on patient's knowledge of their meds.         Electronically signed by:    "

## 2023-05-18 ENCOUNTER — HOSPITAL ENCOUNTER (OUTPATIENT)
Dept: CARDIOLOGY | Facility: HOSPITAL | Age: 53
Discharge: HOME | End: 2023-05-18
Payer: MEDICAID

## 2023-05-18 DIAGNOSIS — I25.10 CORONARY ARTERY DISEASE INVOLVING NATIVE CORONARY ARTERY OF NATIVE HEART WITHOUT ANGINA PECTORIS: ICD-10-CM

## 2023-05-18 PROCEDURE — 93971 EXTREMITY STUDY: CPT | Performed by: RADIOLOGY

## 2023-05-18 PROCEDURE — 93971 EXTREMITY STUDY: CPT

## 2023-08-21 ENCOUNTER — OFFICE VISIT (OUTPATIENT)
Dept: CARDIOLOGY | Facility: CLINIC | Age: 53
End: 2023-08-21
Payer: MEDICAID

## 2023-08-21 VITALS
WEIGHT: 255.6 LBS | DIASTOLIC BLOOD PRESSURE: 68 MMHG | BODY MASS INDEX: 34.62 KG/M2 | OXYGEN SATURATION: 96 % | HEIGHT: 72 IN | HEART RATE: 87 BPM | SYSTOLIC BLOOD PRESSURE: 105 MMHG

## 2023-08-21 DIAGNOSIS — I50.32 CHRONIC DIASTOLIC CONGESTIVE HEART FAILURE: ICD-10-CM

## 2023-08-21 DIAGNOSIS — R06.02 SHORTNESS OF BREATH: ICD-10-CM

## 2023-08-21 DIAGNOSIS — G47.33 OSA ON CPAP: ICD-10-CM

## 2023-08-21 DIAGNOSIS — Z98.890 S/P LEFT ATRIAL APPENDAGE LIGATION: ICD-10-CM

## 2023-08-21 DIAGNOSIS — Z99.89 OSA ON CPAP: ICD-10-CM

## 2023-08-21 DIAGNOSIS — E78.5 HYPERLIPIDEMIA LDL GOAL <70: ICD-10-CM

## 2023-08-21 DIAGNOSIS — I25.10 CORONARY ARTERY DISEASE INVOLVING NATIVE CORONARY ARTERY OF NATIVE HEART WITHOUT ANGINA PECTORIS: ICD-10-CM

## 2023-08-21 DIAGNOSIS — R00.2 PALPITATION: ICD-10-CM

## 2023-08-21 DIAGNOSIS — I10 ESSENTIAL HYPERTENSION: ICD-10-CM

## 2023-08-21 DIAGNOSIS — I48.0 PAF (PAROXYSMAL ATRIAL FIBRILLATION): ICD-10-CM

## 2023-08-21 DIAGNOSIS — R42 DIZZINESS: ICD-10-CM

## 2023-08-21 DIAGNOSIS — Z95.1 S/P CABG (CORONARY ARTERY BYPASS GRAFT): ICD-10-CM

## 2023-08-21 DIAGNOSIS — R07.2 PRECORDIAL PAIN: Primary | ICD-10-CM

## 2023-08-21 PROCEDURE — 3074F SYST BP LT 130 MM HG: CPT | Performed by: INTERNAL MEDICINE

## 2023-08-21 PROCEDURE — 3078F DIAST BP <80 MM HG: CPT | Performed by: INTERNAL MEDICINE

## 2023-08-21 PROCEDURE — 99214 OFFICE O/P EST MOD 30 MIN: CPT | Performed by: INTERNAL MEDICINE

## 2023-08-21 RX ORDER — NITROGLYCERIN 0.4 MG/1
0.4 TABLET SUBLINGUAL
Qty: 25 TABLET | Refills: 3 | Status: SHIPPED | OUTPATIENT
Start: 2023-08-21

## 2023-08-21 RX ORDER — ASPIRIN 81 MG/1
81 TABLET ORAL DAILY
Qty: 30 TABLET | Refills: 11 | COMMUNITY
Start: 2023-07-20 | End: 2024-07-19

## 2023-08-21 RX ORDER — TIRZEPATIDE 15 MG/.5ML
INJECTION, SOLUTION SUBCUTANEOUS
COMMUNITY
Start: 2023-08-07

## 2023-08-21 NOTE — PROGRESS NOTES
Subjective   Ricardo Crespo is a 53 y.o. male     Chief Complaint   Patient presents with    Follow-up     Here for hosp. F/u fro c/p    Chest Pain    Shortness of Breath       PROBLEM LIST:     1. Coronary artery disease  1.1. Stenting of the LAD, 11/2015, per Dr. Moss.  1.2  Stenting of the PDA, 03/2017, in the setting of low-level symptoms and abnormal stress test.  Previous stent to the LAD was patent.  The patient had nonobstructive disease otherwise.  Medical management was recommended.  1.3.  Stenting of the mid RCA and PTCA only of the distal LAD, 09/17.  1. 4 repeat left heart catheter March 2018 with stenting of the LAD x2.  Nonobstructive disease otherwise and patent stent with medical management recommended  1.5 Repeat heart catheterization, 4/2022 with eventual PTCA of the second diagonal of the LAD and stenting of the recurrent apical branch of the LAD.  His previous catheterization had demonstrated the segments of disease, but because of persistent chest pain he eventually had to be returned for intervention.  1.6 LHC per Dr. Gibson on 12-2-2022. Sign. Mid LAD in-stent re-stenoses, non-sign. Ostial 1st. Diagonal. Medical manage.   2. Preserved systolic function  3. Hypertension  4. Dyslipidemia  5. Diabetes mellitus  6. Sleep apnea, untreated. Not worn since weight loss  7. 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 Atrial Fibrillation, HX left atrial appendage       Specialty Problems          Cardiology Problems    Coronary artery disease involving native coronary artery of native heart without angina pectoris        Essential hypertension        Hyperlipidemia LDL goal <70        Palpitation        Chronic diastolic congestive heart failure        PAF (paroxysmal atrial fibrillation)             HPI:  Mr. Crespo returns for follow-up on the above and to discuss test results.    He was working on his Peer[employer] and developed chest discomfort different than his prior angina.  He describes a burning in the  left parasternal area which was not accompanied by nausea, diaphoresis, or shortness of breath.  He states that he is regaining strength and stamina and he has not had to use nitroglycerin since the time of his bypass surgery.  This is opposed to needing 6-8 nitroglycerin a day for pain relief.    The patient has no orthopnea, PND, or change in lower extremity edema.  Mr. Crespo has chronic venous stasis changes and chronic edema of the right lower extremity related to DVT, venous insufficiency secondary to that, as well as cirrhosis.  He describes no palpitations, no claudication, and he has had no symptoms of TIA or stroke.    Mr. Crespo also describes severe orthostatic lightheadedness.  He was riding his mower for several hours in the heat and got off and fell because of presyncope (he was not frankly syncopal) and hit his head.  He describes that he has not been adequately hydrating.  We discussed treatment options for orthostatic hypotension.  Mr. Crespo feels he cannot reduce his Lasix dose because of severe worsening of lower extremity edema.  Therefore, the patient will make certain attempts at maintaining hydration and we will make only slow positional changes particularly if he has been seated or recumbent for prolonged period                    PRIOR MEDICATIONS    Current Outpatient Medications on File Prior to Visit   Medication Sig Dispense Refill    furosemide (LASIX) 40 MG tablet Take 1 tablet by mouth 2 (Two) Times a Day.      HumaLOG 100 UNIT/ML injection Per insulin pump      lithium carbonate 300 MG capsule Take  by mouth 2 (two) times a day. 900 mg po qam and 600 mg qpm  2    Mounjaro 7.5 MG/0.5ML solution pen-injector 1 (One) Time Per Week.      nitroglycerin (NITRODUR) 0.2 MG/HR patch Place 1 patch on the skin as directed by provider See Admin Instructions. Apply patch daily, remove at night for at least 12 hours 30 patch 11    nitroglycerin (NITROSTAT) 0.4 MG SL tablet PLACE 1 TABLET  UNDER THE TONGUE EVERY 5 MINUTES UP TO 3 TABLETS IN 15 MINUTES FOR CHEST PAIN. IF NO RELIEF GO TO THE EMERGENCY ROOM OR CALL 25 tablet 3    potassium chloride (K-DUR,KLOR-CON) 20 MEQ CR tablet 2 (Two) Times a Day.      sertraline (ZOLOFT) 100 MG tablet Take 2 tablets by mouth Daily.  2     No current facility-administered medications on file prior to visit.       ALLERGIES:    Beta adrenergic blockers, Calcium channel blockers, Imdur [isosorbide dinitrate], and Statins    PAST MEDICAL HISTORY:    Past Medical History:   Diagnosis Date    Abnormal ECG     Alcohol liver damage     Anxiety     Arrhythmia     Atrial fibrillation     Benign hypertension     Bipolar depression     CHF (congestive heart failure)     Cirrhosis of liver     Clotting disorder     Coronary artery disease     DVT (deep venous thrombosis)     Questionable history of deep venous thrombosis.    Dyslipidemia     Hyperlipidemia     Myocardial infarction     x 2    Obesity     JIM on CPAP     Obstructive sleep apnea, compliant with CPAP.     Type 2 diabetes mellitus     Valvular disease        SURGICAL HISTORY:    Past Surgical History:   Procedure Laterality Date    ANAL FISTULA REPAIR      CARDIAC CATHETERIZATION      CARDIAC CATHETERIZATION      CARDIAC CATHETERIZATION N/A 1/29/2021    Procedure: Left Heart Cath;  Surgeon: Adam Cortez MD;  Location:  [facility];  Service: Cardiology;  Laterality: N/A;    CARDIAC CATHETERIZATION N/A 9/24/2021    Procedure: LEFT HEART CATH;  Surgeon: Juvencio Welch MD;  Location:  [facility];  Service: Cardiovascular;  Laterality: N/A;    CAROTID STENT      CHOLECYSTECTOMY      CORONARY ANGIOPLASTY WITH STENT PLACEMENT Left 11/09/2015    Persistent cardiac symptoms with cardiac catheterization, Dr. Moss, with PTCA and stenting of the mid-LAD, 11/09/2015.    GANGLION CYST EXCISION      left knee    PYLOROMYOTOMY         SOCIAL HISTORY:    Social History  "    Socioeconomic History    Marital status:    Tobacco Use    Smoking status: Former     Packs/day: 1.00     Years: 15.00     Pack years: 15.00     Types: Cigarettes     Start date: 8/1/1985     Quit date: 2008     Years since quitting: 15.6    Smokeless tobacco: Never    Tobacco comments:     Quit 12 years ago; smoked 17 years at 1 PPD   Substance and Sexual Activity    Alcohol use: No     Comment: Quit 12 years ago; drank a fifth a day for 20 years    Drug use: No    Sexual activity: Yes     Partners: Female     Birth control/protection: None       FAMILY HISTORY:    Family History   Problem Relation Age of Onset    Heart disease Father         CAD, 25 year old    Nephrolithiasis Father     Heart attack Father     Atrial fibrillation Mother     Arrhythmia Mother     No Known Problems Sister        Review of Systems   Constitutional:  Positive for fatigue.   HENT: Negative.     Eyes:  Positive for visual disturbance (glasses prn).   Respiratory:  Positive for shortness of breath.         Denies orthopnea/PND   Cardiovascular:  Positive for chest pain and leg swelling (rt.). Negative for palpitations.   Gastrointestinal: Negative.    Endocrine: Negative.    Genitourinary: Negative.    Musculoskeletal:  Positive for arthralgias and myalgias.   Skin: Negative.    Allergic/Immunologic: Negative.    Neurological:  Positive for light-headedness (with first getting up). Negative for syncope.   Hematological: Negative.    Psychiatric/Behavioral:  Positive for sleep disturbance.      VISIT VITALS:  Vitals:    08/21/23 1215   Height: 182.9 cm (72.01\")      Ht 182.9 cm (72.01\")   BMI 35.80 kg/mý     RECENT LABS:    Objective       Physical Exam  Vitals and nursing note reviewed.   Constitutional:       General: He is not in acute distress.     Appearance: He is well-developed.   HENT:      Head: Normocephalic and atraumatic.   Eyes:      Conjunctiva/sclera: Conjunctivae normal.      Pupils: Pupils are equal, round, " and reactive to light.   Neck:      Vascular: No carotid bruit, hepatojugular reflux or JVD.      Trachea: No tracheal deviation.      Comments: Nl. Carotid upstrokes  Rt. Venous hum  Cardiovascular:      Rate and Rhythm: Normal rate and regular rhythm.      Pulses:           Radial pulses are 2+ on the right side and 2+ on the left side.      Heart sounds: Normal heart sounds. Heart sounds are distant (S1 & S2). No murmur heard.    No friction rub. S4 sounds present. No S3 sounds.   Pulmonary:      Effort: Pulmonary effort is normal.      Breath sounds: Normal breath sounds. No wheezing, rhonchi or rales.      Comments: Nl. Expir. Phase  Nl. Breath sound intensity  Minimal bronchial breath sounds left base  Abdominal:      General: Bowel sounds are normal. There is no distension or abdominal bruit.      Palpations: Abdomen is soft. There is no mass.      Tenderness: There is no abdominal tenderness. There is no guarding or rebound.      Comments: No organomegaly   Musculoskeletal:         General: No tenderness or deformity. Normal range of motion.      Cervical back: Normal range of motion and neck supple.      Right lower leg: Edema present.      Left lower leg: Edema present.      Comments: LLE, trace edema  RLE, 2+ edema mid calf, mod. Vish. Stasis change  Pedal pulses not assessed   Skin:     General: Skin is warm and dry.      Coloration: Skin is not pale.      Findings: No erythema or rash.   Neurological:      Mental Status: He is alert and oriented to person, place, and time.   Psychiatric:         Behavior: Behavior normal.         Thought Content: Thought content normal.         Judgment: Judgment normal.       Procedures      Assessment & Plan   #1.  Chest pain.  Mr. Crespo describes chest discomfort different than his prior angina.  As he has had significant improvement in his functional capacity I do not feel that repeat ischemia assessment is indicated at this time.    2.  Coronary artery disease  status post single-vessel LIMA to LAD bypass for recurrent restenosis in the LAD and low-level medically refractory symptoms.    3.  History of atrial fibrillation difficult to control.  The patient underwent left atrial appendage ablation and Maze procedure at the time of his bypass.    4.  Orthostatic hypotension.  We discussed conservative measures as the patient is insistent that diuretics cannot be decreased.    5.  Dyslipidemia.  Statins have been held because of the patient's known cirrhosis.    6.  Systemic hypertension.  This has not been problematic with weight loss.    7.  Diabetes.    8.  Mr. Crespo will follow with Dr. Bush as instructed, we will plan on seeing him in follow-up in 4 to 6 months or on appearing basis as discussed.   Diagnosis Plan   1. Precordial pain        2. Chronic diastolic congestive heart failure        3. Coronary artery disease involving native coronary artery of native heart without angina pectoris        4. Essential hypertension        5. Hyperlipidemia LDL goal <70        6. PAF (paroxysmal atrial fibrillation)        7. Palpitation        8. S/P CABG (coronary artery bypass graft)        9. S/P left atrial appendage ligation        10. JIM on CPAP        11. Shortness of breath        12. Dizziness            No follow-ups on file.         Ricardo Crespo  reports that he quit smoking about 15 years ago. His smoking use included cigarettes. He started smoking about 38 years ago. He has a 15.00 pack-year smoking history. He has never used smokeless tobacco.. I have educated him on the risk of diseases from using tobacco products such as cancer, COPD, and heart disease.                               Electronically signed by:    Scribed for Leo Ramirez MD by Taya Gonzalez LPN on August 21, 2023  at 12:16 EDT    I, Leo Ramirez MD personally performed the services described in this documentation as scribed by the above named individual in my presence, and it is  both accurate and complete. August 21, 2023 12:16 EDT      Dictated Utilizing Dragon Dictation: Part of this note may be an electronic transcription/translation of spoken language to printed text using the Dragon Dictation System.

## 2023-10-26 ENCOUNTER — TELEPHONE (OUTPATIENT)
Dept: CARDIOLOGY | Facility: CLINIC | Age: 53
End: 2023-10-26
Payer: MEDICAID

## 2023-10-26 DIAGNOSIS — I50.32 CHRONIC DIASTOLIC CONGESTIVE HEART FAILURE: Primary | ICD-10-CM

## 2023-10-26 DIAGNOSIS — R06.02 SHORTNESS OF BREATH: ICD-10-CM

## 2023-10-26 RX ORDER — FUROSEMIDE 40 MG/1
40 TABLET ORAL 2 TIMES DAILY
Qty: 180 TABLET | Refills: 3 | Status: SHIPPED | OUTPATIENT
Start: 2023-10-26

## 2023-10-26 NOTE — TELEPHONE ENCOUNTER
MESSAGE DISCUSSED WITH  GOPAL. HE STATES HE WENT TO [facility] FOR TORN MUSCLE IN HIS ELBOW AND WAS TOLD HIS K+ WAS LOW, AND HE WAS IN A-FIB WITH RVR AND CARDIOLOGY WAS CONSULTED AND DR. LIZARRAGA SAW HIM. HE SAYS CARDIO. WAS CONCERNED BECAUSE OF THE A-FIB AND WAS NOT ON ANTI-COAGULATION. PATIENT SATES HE WAS DC'D HOME AND A EVENT MONITOR (WHICH HE THINKS HE WORE FOR APPROX. 11 DAYS) WAS MAILED TO HIM. ACCORDING TO OTHRO. NOTE IN CARE EVERYWHERE, PATIENT REQUESTED TO SEE DR. YAW ALEXANDRA IN [place]. PATIENT HAS A KNOWN HX OF PAF AND PER CHART HAS HAD AN ATRIAL APPENDAGE IN PAST. HE STATES THAT HE MAY FEEL MAY FEEL PALPS ONCE WEEKLY NAD GETS FATIGUED WITH IT. REPORTS HOME B/P'S RUN 90/70'S AND H/R'S 80-90'S. ROLANDO, PAC STAFF TO OBTAIN ER NOTE, LABS AND EVENT MONITOR AND THEN WILL ADDRESS WITH PROVIDER. PH,LPN          ----- Message from Ricardo Crespo sent at 10/26/2023 12:29 PM EDT -----  Regarding: Afiv rvs  Contact: [phone]  continue::  they instructed me to have my cardiologist to  take care of refills for lasix.

## 2023-11-02 ENCOUNTER — CLINICAL SUPPORT (OUTPATIENT)
Dept: CARDIOLOGY | Facility: CLINIC | Age: 53
End: 2023-11-02
Payer: MEDICAID

## 2023-11-02 VITALS
WEIGHT: 248.8 LBS | HEART RATE: 94 BPM | DIASTOLIC BLOOD PRESSURE: 70 MMHG | OXYGEN SATURATION: 100 % | BODY MASS INDEX: 33.7 KG/M2 | SYSTOLIC BLOOD PRESSURE: 106 MMHG | HEIGHT: 72 IN

## 2023-11-02 DIAGNOSIS — I48.0 PAF (PAROXYSMAL ATRIAL FIBRILLATION): Primary | ICD-10-CM

## 2023-11-02 DIAGNOSIS — R06.02 SHORTNESS OF BREATH: ICD-10-CM

## 2023-11-02 NOTE — PROGRESS NOTES
"Ricardo Crespo  1970 11/2/2023   ?   No chief complaint on file.     ?   HPI:   ?   ? Per chart review, pt sent a [employer]t message on 10/26/23 that stated he had an EKG performed at [facility] in [place] and they wanted him seen due to \"AFIB with rapid ventricle ???  He wanted me to see my cardiologist asap because i was not on blood thinners.\"    Obtained [facility] records from Pat.   ?     Current Outpatient Medications:     aspirin 81 MG EC tablet, Take 1 tablet by mouth Daily., Disp: 30 tablet, Rfl: 11    furosemide (LASIX) 40 MG tablet, Take 1 tablet by mouth Daily for 180 days., Disp: 90 tablet, Rfl: 1    furosemide (LASIX) 40 MG tablet, Take 1 tablet by mouth 2 (Two) Times a Day., Disp: 180 tablet, Rfl: 3    HumaLOG 100 UNIT/ML injection, Per insulin pump, Disp: , Rfl:     lithium carbonate 300 MG capsule, Take  by mouth 2 (two) times a day. 900 mg po qam and 600 mg qpm, Disp: , Rfl: 2    Mounjaro 15 MG/0.5ML solution pen-injector, weekly, Disp: , Rfl:     nitroglycerin (NITROSTAT) 0.4 MG SL tablet, Place 1 tablet under the tongue Every 5 (Five) Minutes As Needed for Chest Pain. Take no more than 3 doses in 15 minutes., Disp: 25 tablet, Rfl: 3    potassium chloride (K-DUR,KLOR-CON) 20 MEQ CR tablet, 2 (Two) Times a Day., Disp: , Rfl:     sertraline (ZOLOFT) 100 MG tablet, Take 2 tablets by mouth Daily., Disp: , Rfl: 2   ?   ?   Beta adrenergic blockers, Calcium channel blockers, Imdur [isosorbide dinitrate], Ranexa [ranolazine], and Statins         ECG 12 Lead    Date/Time: 11/2/2023 10:01 AM  Performed by: Grzegorz Daugherty PA    Authorized by: Grzegorz Daugherty PA  Comparison: compared with previous ECG from 10/12/2023           ?   Assessment & Plan    ?   ? AFiB    Pt states he had open heart about 6 months ago and has felt okay until recently. Stated that he's been having flutters and is very tired/fatigues. States he \"feels so bad.\" Stated that when he had his EKG in [place], they told him there was an " issue w/ a valve and that they didn't want to let him go.   States he has been having to take nitro about once/day for CP.     Performed EKG.     Per verbal order from Grzegorz Daugherty PA-C:  Pt had a left atrial appendage ligation performed at the time of CABG, so blood thinners are not needed. Currently, his EKG shows sinus rhythm, order 14-day monitor.     Informed pt of the above instructions. They verbalized understanding and are aware of plan.   - SAILAJA Ken  ?

## 2023-11-09 ENCOUNTER — TELEPHONE (OUTPATIENT)
Dept: CARDIOLOGY | Facility: CLINIC | Age: 53
End: 2023-11-09
Payer: MEDICAID

## 2023-11-09 NOTE — TELEPHONE ENCOUNTER
"    Hub staff attempted to follow warm transfer process and was unsuccessful     Caller: Ricardo Crespo \"Geo\"    Relationship to patient: Self    Best call back number: [phone]    Patient is needing: PATIENT IS ADVISING DR. CORNELL DOAN AND DR. WING TRIVEDI ARE THE 2 DOCTORS WHO PERFORMED THE OPEN HEART SURGERY AND THE DATE WAS 3.11.2023. PER PATIENT TO VIOLET        "

## 2023-12-12 ENCOUNTER — OFFICE VISIT (OUTPATIENT)
Dept: CARDIOLOGY | Facility: CLINIC | Age: 53
End: 2023-12-12
Payer: MEDICAID

## 2023-12-12 VITALS
HEART RATE: 79 BPM | WEIGHT: 244 LBS | SYSTOLIC BLOOD PRESSURE: 110 MMHG | DIASTOLIC BLOOD PRESSURE: 71 MMHG | BODY MASS INDEX: 33.05 KG/M2 | OXYGEN SATURATION: 97 % | HEIGHT: 72 IN

## 2023-12-12 DIAGNOSIS — I48.0 PAF (PAROXYSMAL ATRIAL FIBRILLATION): ICD-10-CM

## 2023-12-12 DIAGNOSIS — I10 ESSENTIAL HYPERTENSION: ICD-10-CM

## 2023-12-12 DIAGNOSIS — I25.10 CORONARY ARTERY DISEASE INVOLVING NATIVE CORONARY ARTERY OF NATIVE HEART WITHOUT ANGINA PECTORIS: Primary | ICD-10-CM

## 2023-12-12 PROCEDURE — 99214 OFFICE O/P EST MOD 30 MIN: CPT | Performed by: PHYSICIAN ASSISTANT

## 2023-12-12 PROCEDURE — 1159F MED LIST DOCD IN RCRD: CPT | Performed by: PHYSICIAN ASSISTANT

## 2023-12-12 PROCEDURE — 3078F DIAST BP <80 MM HG: CPT | Performed by: PHYSICIAN ASSISTANT

## 2023-12-12 PROCEDURE — 1160F RVW MEDS BY RX/DR IN RCRD: CPT | Performed by: PHYSICIAN ASSISTANT

## 2023-12-12 PROCEDURE — 3074F SYST BP LT 130 MM HG: CPT | Performed by: PHYSICIAN ASSISTANT

## 2023-12-12 NOTE — PROGRESS NOTES
Problem list     Subjective   Ricardo Crespo is a 53 y.o. male     Chief Complaint   Patient presents with    Follow-up     6 month follow up - monitor result's    Chest Pain    Shortness of Breath    Palpitations   PROBLEM LIST:      1. Coronary artery disease  1.1. Stenting of the LAD, 11/2015, per Dr. Moss.  1.2  Stenting of the PDA, 03/2017, in the setting of low-level symptoms and abnormal stress test.  Previous stent to the LAD was patent.  The patient had nonobstructive disease otherwise.  Medical management was recommended.  1.3.  Stenting of the mid RCA and PTCA only of the distal LAD, 09/17.  1.4 Repeat left heart cath, March, 2018, with stenting of the LAD ×2.  Nonobstructive disease otherwise and patent stent with medical management recommended  1.5 Repeat heart catheterization, 4/2022 with eventual PTCA of the second diagonal of the LAD and stenting of the recurrent apical branch of the LAD.  His previous catheterization had demonstrated the segments of disease, but because of persistent chest pain he eventually had to be returned for intervention.  1.6 LHC per Dr. Gibson on 12-2-2022. Significant mid LAD in-stent re-stenoses, non-sign. Ostial 1st. Diagonal. Medical management recommended.  1.7 CABG, 3/2023, in [place].  The patient had single-vessel bypass with LIMA to LAD.  He had concurrent Maze procedure and atrial appendage ligation.  2. Preserved systolic function  3. Hypertension  4. Dyslipidemia  5. Diabetes mellitus  6. Sleep apnea, untreated. Not worn since weight loss  7. 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 Atrial Fibrillation, history of left atrial appendage ligation during bypass as above.    HPI    The patient presents in the clinic today for follow-up.  Since bypass earlier this year, he mostly has felt much improved.  His only chest pain now is typically chest wall pain, poststernotomy.  He denies further chest pain consistent with ischemia.  Dyspnea and fatigue appear improved as  well.  His only issue has been dysrhythmic.  He has increasing palpitations and tachycardia as of recent.  This gentleman does see his EP provider, Dr. Richard to address the same.  Because of increasing symptoms we did repeat a monitor.  This did indeed indicate episodes of A-fib.  The longest episode was just shy of 8 hours.  We have been limited for rate suppressive therapy because of severe orthostatic changes on the same as of recent.  He will be seeing his provider soon to readdress A-fib issues.  From general cardiovascular standpoint, he denies PND and orthopnea, and reports no failure symptoms otherwise.  The patient mostly feels well for now and has no further complaints.  Current Outpatient Medications on File Prior to Visit   Medication Sig Dispense Refill    aspirin 81 MG EC tablet Take 1 tablet by mouth Daily. 30 tablet 11    furosemide (LASIX) 40 MG tablet Take 1 tablet by mouth 2 (Two) Times a Day. 180 tablet 3    HumaLOG 100 UNIT/ML injection Per insulin pump      lithium carbonate 300 MG capsule Take  by mouth 2 (two) times a day. 900 mg po qam and 600 mg qpm  2    Mounjaro 15 MG/0.5ML solution pen-injector weekly      nitroglycerin (NITROSTAT) 0.4 MG SL tablet Place 1 tablet under the tongue Every 5 (Five) Minutes As Needed for Chest Pain. Take no more than 3 doses in 15 minutes. 25 tablet 3    potassium chloride (K-DUR,KLOR-CON) 20 MEQ CR tablet 2 (Two) Times a Day.      sertraline (ZOLOFT) 100 MG tablet Take 2 tablets by mouth Daily.  2    [DISCONTINUED] furosemide (LASIX) 40 MG tablet Take 1 tablet by mouth Daily for 180 days. 90 tablet 1     No current facility-administered medications on file prior to visit.       Beta adrenergic blockers, Calcium channel blockers, Imdur [isosorbide dinitrate], Ranexa [ranolazine], and Statins    Past Medical History:   Diagnosis Date    Abnormal ECG     Alcohol liver damage     Aneurysm     Anxiety     Arrhythmia     Atrial fibrillation     Benign hypertension      Bipolar depression     CHF (congestive heart failure)     Cirrhosis of liver     Clotting disorder     Congenital heart disease     Coronary artery disease     DVT (deep venous thrombosis)     Questionable history of deep venous thrombosis.    Dyslipidemia     Hyperlipidemia     Myocardial infarction     x 2    Obesity     JIM on CPAP     Obstructive sleep apnea, compliant with CPAP.     Pulmonary embolism     S/P left atrial appendage ligation 05/03/2023    Type 2 diabetes mellitus     Valvular disease        Social History     Socioeconomic History    Marital status:    Tobacco Use    Smoking status: Former     Packs/day: 1.00     Years: 15.00     Additional pack years: 0.00     Total pack years: 15.00     Types: Cigarettes     Start date: 8/1/1985     Quit date: 1/1/2008     Years since quitting: 15.9    Smokeless tobacco: Never    Tobacco comments:     Quit 12 years ago; smoked 17 years at 1 PPD   Substance and Sexual Activity    Alcohol use: Not Currently     Comment: Quit 12 years ago; drank a fifth a day for 20 years    Drug use: No    Sexual activity: Yes     Partners: Female     Birth control/protection: None       Family History   Problem Relation Age of Onset    Heart disease Father         Arterial disease    Nephrolithiasis Father     Heart attack Father     Heart failure Father     Atrial fibrillation Mother     Arrhythmia Mother     No Known Problems Sister        Review of Systems   Constitutional: Negative.  Negative for chills, diaphoresis, fatigue and fever.   HENT: Negative.     Eyes: Negative.  Negative for visual disturbance.   Respiratory:  Positive for shortness of breath. Negative for apnea, cough, chest tightness and wheezing.    Cardiovascular:  Positive for chest pain, palpitations and leg swelling.   Gastrointestinal:  Positive for abdominal pain and blood in stool.   Endocrine: Negative.    Genitourinary: Negative.  Negative for hematuria.   Musculoskeletal:  Positive for back  "pain. Negative for arthralgias, myalgias, neck pain and neck stiffness.   Skin: Negative.  Negative for rash and wound.   Allergic/Immunologic: Negative.  Negative for environmental allergies and food allergies.   Neurological:  Positive for numbness (neuropathy in feet and legs). Negative for dizziness, syncope, weakness, light-headedness and headaches.   Hematological:  Bruises/bleeds easily (bruises easily).   Psychiatric/Behavioral: Negative.  Negative for sleep disturbance.        Objective   Vitals:    23 0911   BP: 110/71   Pulse: 79   SpO2: 97%   Weight: 111 kg (244 lb)   Height: 182.9 cm (72\")      /71   Pulse 79   Ht 182.9 cm (72\")   Wt 111 kg (244 lb)   SpO2 97%   BMI 33.09 kg/m²    Lab Results (most recent)       None          Physical Exam  Vitals and nursing note reviewed.   Constitutional:       General: He is not in acute distress.     Appearance: He is well-developed.   HENT:      Head: Normocephalic and atraumatic.   Eyes:      Conjunctiva/sclera: Conjunctivae normal.      Pupils: Pupils are equal, round, and reactive to light.   Neck:      Vascular: No JVD.      Trachea: No tracheal deviation.   Cardiovascular:      Rate and Rhythm: Normal rate and regular rhythm.      Heart sounds: Normal heart sounds.   Pulmonary:      Effort: Pulmonary effort is normal.      Breath sounds: Decreased breath sounds present.   Abdominal:      General: Bowel sounds are normal. There is no distension.      Palpations: Abdomen is soft. There is no mass.      Tenderness: There is no abdominal tenderness. There is no guarding or rebound.   Musculoskeletal:         General: No tenderness or deformity. Normal range of motion.      Cervical back: Normal range of motion and neck supple.      Right lower le+      Left lower le+   Skin:     General: Skin is warm and dry.      Coloration: Skin is not pale.      Findings: No erythema or rash.   Neurological:      Mental Status: He is alert and oriented " to person, place, and time.   Psychiatric:         Behavior: Behavior normal.         Thought Content: Thought content normal.         Judgment: Judgment normal.           Procedure   Procedures       Assessment & Plan      Diagnosis Plan   1. Coronary artery disease involving native coronary artery of native heart without angina pectoris        2. PAF (paroxysmal atrial fibrillation)        3. Essential hypertension        1.  At this time, the patient appears to be doing fairly well from general cardiovascular standpoint.  He has much improved post bypass.  Previously described symptoms of angina I have largely resolved post bypass.    2.  We did discuss lipid management medications at this time.  Because of liver status, statin therapy was discontinued previously.  He tried PCSK9 inhibitor therapy, but could not tolerate that because of side effects.  I would not challenge further therapy at this time.  We have addressed and reviewed lifestyle modifications.    3.  A-fib is fairly symptomatic for him.  A-fib was noted on recent monitor.  We have forwarded a copy of his event monitor to his EP provider.  We will await their recommendations.    4.  As the patient is mostly stable, nothing further.  No adjustments in medications will be made.    5.  He did voice concerns with orthostatic hypotension.  We discussed augmentation of diuretic therapy as this is likely contributing.  He tells me that if he does not take diuretic therapy daily, he has severe failure symptoms.  We discussed physical maneuvers to address his degree of orthostasis otherwise.                              Electronically signed by:

## 2023-12-21 ENCOUNTER — TELEPHONE (OUTPATIENT)
Dept: CARDIOLOGY | Facility: CLINIC | Age: 53
End: 2023-12-21
Payer: MEDICAID

## 2023-12-21 NOTE — TELEPHONE ENCOUNTER
----- Message from Keshia Patrick MA sent at 12/21/2023  3:31 PM EST -----  Have you sent to EP? Who is patients EP? I can send if not.  ----- Message -----  From: Marialuisa Fair RegSched Rep  Sent: 12/19/2023  11:43 AM EST  To: Keshia Patrick MA      ----- Message -----  From: Grzegorz Daugherty PA  Sent: 12/18/2023   7:55 PM EST  To: Cynthia Aceves MA    Forward to the patient's EP provider if not previously done.  ----- Message -----  From: Leo Ramirez MD  Sent: 12/18/2023   7:39 PM EST  To: CARINA Cifuentes        Mobile Cardiac Outpatient Telemetry  Order: [id number]  Status: Final result       Visible to patient: Yes (seen)       Dx: PAF (paroxysmal atrial fibrillation)    0 Result Notes  Details    Reading Physician Reading Date Result Priority   Leo Ramirez MD  [phone] 12/18/2023 Routine     Result Text  1.  Sinus rhythm as the baseline and predominant rhythm throughout the study.  Borderline first-degree AV block noted at times.  2.  Atrial fibrillation noted at times, representing 5% burden.  3.  Maximum rate while in atrial fibrillation was at 116 bpm.  4.  Otherwise, occasional PACs were noted.  Rare PVCs were present.  5.  Rare atrial couplets and triplets were present.  6.  No other arrhythmia, ectopy, or block of significance noted.  7.  A total of 29 patient triggers were activated during the study, correlating to patient's symptoms.  This occurred mostly during sinus rhythm, frequently with PACs.  Atrial fibrillation was rarely sensed.

## 2024-01-02 ENCOUNTER — TELEPHONE (OUTPATIENT)
Dept: CARDIOLOGY | Facility: CLINIC | Age: 54
End: 2024-01-02
Payer: MEDICAID

## 2024-01-02 DIAGNOSIS — I10 ESSENTIAL HYPERTENSION: ICD-10-CM

## 2024-01-02 DIAGNOSIS — I25.10 CORONARY ARTERY DISEASE INVOLVING NATIVE CORONARY ARTERY OF NATIVE HEART WITHOUT ANGINA PECTORIS: ICD-10-CM

## 2024-01-02 DIAGNOSIS — R07.2 PRECORDIAL PAIN: Primary | ICD-10-CM

## 2024-01-02 DIAGNOSIS — R00.2 PALPITATIONS: ICD-10-CM

## 2024-01-02 DIAGNOSIS — Z98.890 S/P LEFT ATRIAL APPENDAGE LIGATION: ICD-10-CM

## 2024-01-02 NOTE — TELEPHONE ENCOUNTER
----- Message from Ricardo Crespo sent at 1/2/2024  2:36 PM EST -----  Regarding: Geo Crespo (chest/back/arm pain)  Contact: [phone]  Hey, the past 3 days i have been having worsening pain in my left arm, back, and chest.  i increased my nitro  they help but not for over an hour.   also have burning sensation right in the area of my heart.

## 2024-07-30 LAB
MAXIMAL PREDICTED HEART RATE: 172 BPM
STRESS TARGET HR: 146 BPM

## 2024-08-19 ENCOUNTER — OFFICE VISIT (OUTPATIENT)
Dept: CARDIOLOGY | Facility: CLINIC | Age: 54
End: 2024-08-19
Payer: MEDICAID

## 2024-08-19 VITALS
HEART RATE: 86 BPM | SYSTOLIC BLOOD PRESSURE: 97 MMHG | OXYGEN SATURATION: 98 % | BODY MASS INDEX: 32.37 KG/M2 | WEIGHT: 239 LBS | HEIGHT: 72 IN | DIASTOLIC BLOOD PRESSURE: 65 MMHG

## 2024-08-19 DIAGNOSIS — I95.89 CHRONIC HYPOTENSION: ICD-10-CM

## 2024-08-19 DIAGNOSIS — R06.02 SHORTNESS OF BREATH: ICD-10-CM

## 2024-08-19 DIAGNOSIS — I25.119 CORONARY ARTERY DISEASE INVOLVING NATIVE CORONARY ARTERY OF NATIVE HEART WITH ANGINA PECTORIS: Primary | ICD-10-CM

## 2024-08-19 DIAGNOSIS — R42 DIZZINESS: ICD-10-CM

## 2024-08-19 PROCEDURE — 1160F RVW MEDS BY RX/DR IN RCRD: CPT | Performed by: PHYSICIAN ASSISTANT

## 2024-08-19 PROCEDURE — 99214 OFFICE O/P EST MOD 30 MIN: CPT | Performed by: PHYSICIAN ASSISTANT

## 2024-08-19 PROCEDURE — 3078F DIAST BP <80 MM HG: CPT | Performed by: PHYSICIAN ASSISTANT

## 2024-08-19 PROCEDURE — 1159F MED LIST DOCD IN RCRD: CPT | Performed by: PHYSICIAN ASSISTANT

## 2024-08-19 PROCEDURE — 3074F SYST BP LT 130 MM HG: CPT | Performed by: PHYSICIAN ASSISTANT

## 2024-08-19 RX ORDER — GABAPENTIN 100 MG/1
100 CAPSULE ORAL 2 TIMES DAILY
COMMUNITY
Start: 2024-08-12

## 2024-08-19 RX ORDER — LACTULOSE 10 G/15ML
30 SOLUTION ORAL; RECTAL 2 TIMES DAILY
COMMUNITY
Start: 2024-08-05

## 2024-08-19 NOTE — PROGRESS NOTES
Problem list     Subjective   Ricardo Crespo is a 54 y.o. male     Chief Complaint   Patient presents with    Follow-up     Heart cath per Dr. Gibson 5/3/24, patient reports had bleed in brain 6 weeks ago was admitted to [facility] in [place]     Chest Pain    Shortness of Breath   PROBLEM LIST:      1. Coronary artery disease  1.1. Stenting of the LAD, 11/2015, per Dr. Moss.  1.2  Stenting of the PDA, 03/2017, in the setting of low-level symptoms and abnormal stress test.  Previous stent to the LAD was patent.  The patient had nonobstructive disease otherwise.  Medical management was recommended.  1.3.  Stenting of the mid RCA and PTCA only of the distal LAD, 09/17.  1.4 Repeat left heart cath, March, 2018, with stenting of the LAD ×2.  Nonobstructive disease otherwise and patent stent with medical management recommended  1.5 Repeat heart catheterization, 4/2022 with eventual PTCA of the second diagonal of the LAD and stenting of the recurrent apical branch of the LAD.  His previous catheterization had demonstrated the segments of disease, but because of persistent chest pain he eventually had to be returned for intervention.  1.6 LHC per Dr. Gibson on 12-2-2022. Significant mid LAD in-stent re-stenoses, non-sign. Ostial 1st. Diagonal. Medical management recommended.  1.7 CABG, 3/2023, in [place].  The patient had single-vessel bypass with LIMA to LAD.  He had concurrent Maze procedure and atrial appendage ligation.  1.8 repeat left heart catheterization, 5/2024.  He had patent LIMA to LAD and diffuse small vessel/moderate disease otherwise.  Medical management was recommended.  2. Preserved systolic function  3. Hypertension  4. Dyslipidemia  5. Diabetes mellitus  6. Sleep apnea, untreated. Not worn since weight loss  7. 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 Atrial Fibrillation, history of left atrial appendage ligation during bypass as above.       HPI    The patient presents in the clinic today for follow-up.   He did have catheterization in May as above.  That was performed by Dr. Gibson.  This indicated patent LIMA to LAD with diffuse moderate but nonobstructive disease.  He had quite extensive small vessel disease.  Medical management was recommended.  The issue with this gentleman is that he has been intolerant to beta-blocker and calcium channel blocker therapy because of hypotension.  He did not tolerate long-acting nitrate therapy.  Because of numerous side effects, Ranexa had to be discontinued.  Antiplatelet therapy was discontinued despite his extensive cardiovascular history by neurology.  He was told not to restart that.  He is pending follow-up with them and we will await the recommendations in that regard.  He has chronic angina, mostly at baseline for him and at least moderate.  He has stable dyspnea.  He has no failure nor dysrhythmic symptoms.  He has no further complaints.  Current Outpatient Medications on File Prior to Visit   Medication Sig Dispense Refill    Cholecalciferol 50 MCG (2000 UT) tablet Take 1 tablet by mouth Daily.      furosemide (LASIX) 40 MG tablet Take 1 tablet by mouth 2 (Two) Times a Day. 180 tablet 3    gabapentin (NEURONTIN) 100 MG capsule Take 1 capsule by mouth 2 (Two) Times a Day.      HumaLOG 100 UNIT/ML injection Per insulin pump      lactulose (CHRONULAC) 10 GM/15ML solution solution (encephalopathy) Take 45 mL by mouth 2 (Two) Times a Day.      Mounjaro 15 MG/0.5ML solution pen-injector weekly      nitroglycerin (NITROSTAT) 0.4 MG SL tablet Place 1 tablet under the tongue Every 5 (Five) Minutes As Needed for Chest Pain. Take no more than 3 doses in 15 minutes. 25 tablet 3    potassium chloride (K-DUR,KLOR-CON) 20 MEQ CR tablet 2 (Two) Times a Day.      sertraline (ZOLOFT) 100 MG tablet Take 2 tablets by mouth Daily.  2    [DISCONTINUED] lithium carbonate 300 MG capsule Take  by mouth 2 (two) times a day. 900 mg po qam and 600 mg qpm  2     No current facility-administered  medications on file prior to visit.       Beta adrenergic blockers, Calcium channel blockers, Imdur [isosorbide dinitrate], Ranexa [ranolazine], and Statins    Past Medical History:   Diagnosis Date    Abnormal ECG     Alcohol liver damage     Aneurysm     Anxiety     Arrhythmia     Atrial fibrillation     Benign hypertension     Bipolar depression     CHF (congestive heart failure)     Cirrhosis of liver     Clotting disorder     Congenital heart disease     Coronary artery disease     DVT (deep venous thrombosis)     Questionable history of deep venous thrombosis.    Dyslipidemia     Hyperlipidemia     Myocardial infarction     x 2    Obesity     JIM on CPAP     Obstructive sleep apnea, compliant with CPAP.     Pulmonary embolism     S/P left atrial appendage ligation 2023    Type 2 diabetes mellitus     Valvular disease        Social History     Socioeconomic History    Marital status:    Tobacco Use    Smoking status: Former     Current packs/day: 0.00     Average packs/day: 1 pack/day for 22.4 years (22.4 ttl pk-yrs)     Types: Cigarettes     Start date: 1985     Quit date: 2008     Years since quittin.6    Smokeless tobacco: Never    Tobacco comments:     Quit 12 years ago; smoked 17 years at 1 PPD   Substance and Sexual Activity    Alcohol use: Not Currently     Comment: Quit 12 years ago; drank a fifth a day for 20 years    Drug use: No    Sexual activity: Yes     Partners: Female     Birth control/protection: None       Family History   Problem Relation Age of Onset    Heart disease Father         Arterial disease    Nephrolithiasis Father     Heart attack Father     Heart failure Father     Atrial fibrillation Mother     Arrhythmia Mother     No Known Problems Sister        Review of Systems   Constitutional: Negative.  Negative for chills, diaphoresis, fatigue and fever.   HENT: Negative.     Eyes: Negative.  Negative for visual disturbance.   Respiratory:  Positive for shortness  "of breath. Negative for apnea, cough, chest tightness and wheezing.    Cardiovascular:  Positive for chest pain and leg swelling. Negative for palpitations.   Gastrointestinal:  Positive for abdominal pain. Negative for blood in stool.   Endocrine: Negative.    Genitourinary: Negative.  Negative for hematuria.   Musculoskeletal:  Positive for back pain. Negative for arthralgias, myalgias, neck pain and neck stiffness.   Skin: Negative.  Negative for rash and wound.   Allergic/Immunologic: Negative.  Negative for environmental allergies and food allergies.   Neurological:  Positive for dizziness. Negative for syncope, weakness, light-headedness, numbness and headaches.   Hematological:  Bruises/bleeds easily.   Psychiatric/Behavioral:  Positive for sleep disturbance.        Objective   Vitals:    08/19/24 0958   BP: 97/65   Pulse: 86   SpO2: 98%   Weight: 108 kg (239 lb)   Height: 182.9 cm (72\")      BP 97/65   Pulse 86   Ht 182.9 cm (72\")   Wt 108 kg (239 lb)   SpO2 98%   BMI 32.41 kg/m²    Lab Results (most recent)       None          Physical Exam  Vitals and nursing note reviewed.   Constitutional:       General: He is not in acute distress.     Appearance: He is well-developed.   HENT:      Head: Normocephalic and atraumatic.   Eyes:      Conjunctiva/sclera: Conjunctivae normal.      Pupils: Pupils are equal, round, and reactive to light.   Neck:      Vascular: No JVD.      Trachea: No tracheal deviation.   Cardiovascular:      Rate and Rhythm: Normal rate and regular rhythm.      Heart sounds: Normal heart sounds.   Pulmonary:      Effort: Pulmonary effort is normal.      Breath sounds: Normal breath sounds.   Abdominal:      General: Bowel sounds are normal. There is no distension.      Palpations: Abdomen is soft. There is no mass.      Tenderness: There is no abdominal tenderness. There is no guarding or rebound.   Musculoskeletal:         General: No tenderness or deformity. Normal range of motion.     "  Cervical back: Normal range of motion and neck supple.      Right lower le+ Edema present.      Left lower le+ Edema present.   Skin:     General: Skin is warm and dry.      Coloration: Skin is not pale.      Findings: No erythema or rash.   Neurological:      Mental Status: He is alert and oriented to person, place, and time.   Psychiatric:         Behavior: Behavior normal.         Thought Content: Thought content normal.         Judgment: Judgment normal.           Procedure   Procedures       Assessment & Plan      Diagnosis Plan   1. Coronary artery disease involving native coronary artery of native heart with angina pectoris  Adult Transthoracic Echo Complete W/ Cont if Necessary Per Protocol      2. Shortness of breath  Adult Transthoracic Echo Complete W/ Cont if Necessary Per Protocol      3. Chronic hypotension  Adult Transthoracic Echo Complete W/ Cont if Necessary Per Protocol      4. Dizziness  Adult Transthoracic Echo Complete W/ Cont if Necessary Per Protocol    Duplex Carotid Ultrasound CAR        1.  The patient presents in the clinic today for routine evaluation and follow-up.  He has chronic angina.  We have been unable to institute antianginal regimen, given intolerance to virtually all medications as above.  In short, he has not been able to tolerate beta-blocker therapy, calcium channel blocker therapy, long-acting nitrate therapy, and even Ranexa.  Those have all been discontinued.  We have discussed a trial of L-arginine, empirically, to see if symptoms improve.    2.  Antiplatelet therapy was discontinued reportedly by his neurology team.  We have encouraged the need for the same, but will hold as they discontinued this.  We are awaiting their recommendations as he will soon follow-up with him.    3.  The patient does not tolerate statin therapy because of his cirrhotic status.  He did attempt injectable cholesterol medications, and could not tolerate that because of diffuse  myalgias.  I would not challenge further lipid management medications as he is intolerant to virtually everything.    4.  With clinical scenario, history of CVA, etc., we will schedule for echo and carotid duplex for further evaluation.    5.  We will see him in follow-up and recommend further.  Nothing further for now.             Electronically signed by:

## 2024-09-13 DIAGNOSIS — R07.2 PRECORDIAL PAIN: ICD-10-CM

## 2024-09-13 RX ORDER — NITROGLYCERIN 0.4 MG/1
TABLET SUBLINGUAL
Qty: 25 TABLET | Refills: 0 | Status: SHIPPED | OUTPATIENT
Start: 2024-09-13

## 2024-09-23 ENCOUNTER — HOSPITAL ENCOUNTER (OUTPATIENT)
Dept: CARDIOLOGY | Facility: HOSPITAL | Age: 54
Discharge: HOME | End: 2024-09-23
Payer: MEDICAID

## 2024-09-23 VITALS — BODY MASS INDEX: 32.25 KG/M2 | HEIGHT: 72 IN | WEIGHT: 238.1 LBS

## 2024-09-23 DIAGNOSIS — I95.89 CHRONIC HYPOTENSION: ICD-10-CM

## 2024-09-23 DIAGNOSIS — R42 DIZZINESS: ICD-10-CM

## 2024-09-23 DIAGNOSIS — I25.119 CORONARY ARTERY DISEASE INVOLVING NATIVE CORONARY ARTERY OF NATIVE HEART WITH ANGINA PECTORIS: ICD-10-CM

## 2024-09-23 DIAGNOSIS — R06.02 SHORTNESS OF BREATH: ICD-10-CM

## 2024-09-23 PROCEDURE — 93306 TTE W/DOPPLER COMPLETE: CPT

## 2024-09-23 PROCEDURE — 93880 EXTRACRANIAL BILAT STUDY: CPT

## 2024-09-29 LAB
AORTIC DIMENSIONLESS INDEX: 0.9 (DI)
BH CV ECHO MEAS - ACS: 1.99 CM
BH CV ECHO MEAS - AO MAX PG: 5.1 MMHG
BH CV ECHO MEAS - AO MEAN PG: 3.2 MMHG
BH CV ECHO MEAS - AO ROOT DIAM: 4 CM
BH CV ECHO MEAS - AO V2 MAX: 113 CM/SEC
BH CV ECHO MEAS - AO V2 VTI: 26.9 CM
BH CV ECHO MEAS - EDV(CUBED): 106.8 ML
BH CV ECHO MEAS - EF(MOD-BP): 51 %
BH CV ECHO MEAS - ESV(CUBED): 43.2 ML
BH CV ECHO MEAS - FS: 26.1 %
BH CV ECHO MEAS - IVS/LVPW: 0.93 CM
BH CV ECHO MEAS - IVSD: 1.39 CM
BH CV ECHO MEAS - LA DIMENSION: 5.3 CM
BH CV ECHO MEAS - LAT PEAK E' VEL: 5.8 CM/SEC
BH CV ECHO MEAS - LV MASS(C)D: 279.9 GRAMS
BH CV ECHO MEAS - LV MAX PG: 5.1 MMHG
BH CV ECHO MEAS - LV MEAN PG: 2.4 MMHG
BH CV ECHO MEAS - LV V1 MAX: 113.3 CM/SEC
BH CV ECHO MEAS - LV V1 VTI: 24.3 CM
BH CV ECHO MEAS - LVIDD: 4.7 CM
BH CV ECHO MEAS - LVIDS: 3.5 CM
BH CV ECHO MEAS - LVPWD: 1.49 CM
BH CV ECHO MEAS - MED PEAK E' VEL: 4.6 CM/SEC
BH CV ECHO MEAS - MR MAX PG: 41.1 MMHG
BH CV ECHO MEAS - MR MAX VEL: 320.7 CM/SEC
BH CV ECHO MEAS - MV A MAX VEL: 54.5 CM/SEC
BH CV ECHO MEAS - MV DEC SLOPE: 651.4 CM/SEC2
BH CV ECHO MEAS - MV DEC TIME: 0.12 SEC
BH CV ECHO MEAS - MV E MAX VEL: 146 CM/SEC
BH CV ECHO MEAS - MV E/A: 2.7
BH CV ECHO MEAS - MV MAX PG: 12 MMHG
BH CV ECHO MEAS - MV MEAN PG: 4.2 MMHG
BH CV ECHO MEAS - MV P1/2T: 76.9 MSEC
BH CV ECHO MEAS - MV V2 VTI: 42.6 CM
BH CV ECHO MEAS - MVA(P1/2T): 2.9 CM2
BH CV ECHO MEAS - PA V2 MAX: 95.6 CM/SEC
BH CV ECHO MEAS - RAP SYSTOLE: 8 MMHG
BH CV ECHO MEAS - RV MAX PG: 1.17 MMHG
BH CV ECHO MEAS - RV V1 MAX: 54 CM/SEC
BH CV ECHO MEAS - RV V1 VTI: 10.3 CM
BH CV ECHO MEAS - RVSP: 25.4 MMHG
BH CV ECHO MEAS - TAPSE (>1.6): 2.21 CM
BH CV ECHO MEAS - TR MAX PG: 17.4 MMHG
BH CV ECHO MEAS - TR MAX VEL: 208.7 CM/SEC
BH CV ECHO MEASUREMENTS AVERAGE E/E' RATIO: 28.08
BH CV XLRA - TDI S': 6 CM/SEC
BH CV XLRA MEAS LEFT CAROTID BULB EDV: 19.1 CM/SEC
BH CV XLRA MEAS LEFT CAROTID BULB PSV: 101 CM/SEC
BH CV XLRA MEAS LEFT DIST CCA EDV: -19.1 CM/SEC
BH CV XLRA MEAS LEFT DIST CCA PSV: -94.4 CM/SEC
BH CV XLRA MEAS LEFT DIST ICA EDV: -33.1 CM/SEC
BH CV XLRA MEAS LEFT DIST ICA PSV: -103 CM/SEC
BH CV XLRA MEAS LEFT ICA/CCA RATIO: 1.3
BH CV XLRA MEAS LEFT MID ICA EDV: -28 CM/SEC
BH CV XLRA MEAS LEFT MID ICA PSV: -74.8 CM/SEC
BH CV XLRA MEAS LEFT PROX CCA EDV: 18.2 CM/SEC
BH CV XLRA MEAS LEFT PROX CCA PSV: 98.8 CM/SEC
BH CV XLRA MEAS LEFT PROX ECA PSV: -101 CM/SEC
BH CV XLRA MEAS LEFT PROX ICA EDV: 27.7 CM/SEC
BH CV XLRA MEAS LEFT PROX ICA PSV: 120 CM/SEC
BH CV XLRA MEAS LEFT VERTEBRAL A EDV: -9.8 CM/SEC
BH CV XLRA MEAS LEFT VERTEBRAL A PSV: -38.8 CM/SEC
BH CV XLRA MEAS RIGHT CAROTID BULB EDV: 21 CM/SEC
BH CV XLRA MEAS RIGHT CAROTID BULB PSV: 79.2 CM/SEC
BH CV XLRA MEAS RIGHT DIST CCA EDV: 14 CM/SEC
BH CV XLRA MEAS RIGHT DIST CCA PSV: 93.2 CM/SEC
BH CV XLRA MEAS RIGHT DIST ICA EDV: -30.4 CM/SEC
BH CV XLRA MEAS RIGHT DIST ICA PSV: -85.7 CM/SEC
BH CV XLRA MEAS RIGHT ICA/CCA RATIO: 0.9
BH CV XLRA MEAS RIGHT MID ICA EDV: -24.2 CM/SEC
BH CV XLRA MEAS RIGHT MID ICA PSV: -78.9 CM/SEC
BH CV XLRA MEAS RIGHT PROX CCA EDV: 24.5 CM/SEC
BH CV XLRA MEAS RIGHT PROX CCA PSV: 105 CM/SEC
BH CV XLRA MEAS RIGHT PROX ECA PSV: -86.9 CM/SEC
BH CV XLRA MEAS RIGHT PROX ICA EDV: 17 CM/SEC
BH CV XLRA MEAS RIGHT PROX ICA PSV: 77.9 CM/SEC
BH CV XLRA MEAS RIGHT VERTEBRAL A EDV: 16.2 CM/SEC
BH CV XLRA MEAS RIGHT VERTEBRAL A PSV: 39.8 CM/SEC
SINUS: 3.3 CM

## 2024-10-02 ENCOUNTER — TELEPHONE (OUTPATIENT)
Dept: CARDIOLOGY | Facility: CLINIC | Age: 54
End: 2024-10-02
Payer: MEDICAID

## 2024-10-02 NOTE — TELEPHONE ENCOUNTER
----- Message from Grzegorz Daugherty sent at 9/30/2024  8:59 AM EDT -----  Normal systolic function, mild LVH with grade 2 diastolic dysfunction, aortic root dilated at 4 cm.  ----- Message -----  From: Leo Ramirez MD  Sent: 9/29/2024   9:47 AM EDT  To: CARINA Cifuentes    Adult Transthoracic Echo Complete W/ Cont if Necessary Per Protocol  Order: [id number]  Status: Final result       Visible to patient: Yes (seen)       Dx: Chronic hypotension; Shortness of kirit...    0 Result Notes  Details    Reading Physician Reading Date Result Priority   Leo Ramirez MD  [phone] 9/29/2024 Routine     Result Text  Physician interpretation.  Mildly technically limited study.     1.  LV size, function, and wall motion are normal.  Mild concentric left ventricular hypertrophy.  Visually estimated ejection fraction is 50 to 55%.  Grade 2 diastolic dysfunction.  Mild to moderate left atrial and mild right atrial enlargement.  The right ventricle appears to be mildly increased in size but RV systolic function is grossly preserved.  No septal defect or intracavitary mass or thrombus.     2.  Valves are morphologically normal with no stenotic lesions or valve associated masses or thrombi.  There is trivial to mild MR and TR.     3.  There is no pericardial effusion.  The aortic root was measured at 4 cm in diameter by M-mode but appears grossly normal on 2D imaging.     4.  RV and PA systolic pressures are estimated in the mid 20s.      Clarified with Grzegorz GONZALES recommendations routine follow up.    Patient notified of results and recommendation's per Grzegorz gonzales to keep routine follow up appointment.

## 2024-10-02 NOTE — TELEPHONE ENCOUNTER
Grzegorz Daugherty PA Worley, Lois J, MA  Routine follow-up.          Previous Messages    Duplex Carotid Ultrasound CAR  Order: [id number]  Status: Final result       Visible to patient: Yes (seen)       Dx: Dizziness    0 Result Notes  Details    Reading Physician Reading Date Result Priority   Leo Ramirez MD  [phone] 9/29/2024 Routine     Result Text       Antegrade right vertebral flow.    Antegrade left vertebral flow.     1.  Both common carotid arteries are widely patent and are without visible atherosclerotic change.     2.  Minimal irregularities are noted in the bifurcations bilaterally with less than or equal to 15% stenosis by Doppler on the right and 16 to 49% stenosis by Doppler in the left.     3.  Less than or equal to 15% stenosis by Doppler in the right internal carotid artery with 16 to 49% stenosis by Doppler in the left internal carotid artery.  There are minor irregularities in the proximal left internal carotid artery.     4.  Antegrade flow in both vertebral arteries.     Summary: Nonobstructive carotid disease bilaterally as detailed above.  Antegrade flow in both vertebral arteries.   patient notified of result's and recommendation's to keep routine follow up appointment

## 2024-11-01 DIAGNOSIS — R07.2 PRECORDIAL PAIN: ICD-10-CM

## 2024-11-04 RX ORDER — NITROGLYCERIN 0.4 MG/1
TABLET SUBLINGUAL
Qty: 25 TABLET | Refills: 0 | Status: SHIPPED | OUTPATIENT
Start: 2024-11-04

## 2024-11-25 DIAGNOSIS — I50.32 CHRONIC DIASTOLIC CONGESTIVE HEART FAILURE: ICD-10-CM

## 2024-11-25 DIAGNOSIS — R06.02 SHORTNESS OF BREATH: ICD-10-CM

## 2024-11-25 RX ORDER — FUROSEMIDE 40 MG/1
TABLET ORAL
Qty: 180 TABLET | Refills: 1 | Status: SHIPPED | OUTPATIENT
Start: 2024-11-25

## 2024-11-25 NOTE — TELEPHONE ENCOUNTER
Name from pharmacy: FUROSEMIDE 40 MG TABLET         Will file in chart as: furosemide (LASIX) 40 MG tablet    Sig: TAKE 1 TABLET BY MOUTH TWO TIMES A DAY for fluid    Disp: 180 tablet    Refills: 0    Start: 11/25/2024    Class: Normal    Non-formulary For: Chronic diastolic congestive heart failure, Shortness of breath    To pharmacy: NA    Last ordered: 1 year ago (10/26/2023) by Leo Ramirez MD    Last refill: 10/25/2023    Rx #: [id number]    Diuretics Protocol [id number]/25/2024 09:46 AM   Protocol Details Normal serum potassium in past 12 months    Normal serum sodium in past 12 months    GFR> 30 ml/min in past year    Recent or future visit with authorizing provider    Blood pressure on record

## 2024-12-14 DIAGNOSIS — R07.2 PRECORDIAL PAIN: ICD-10-CM

## 2024-12-16 RX ORDER — NITROGLYCERIN 0.4 MG/1
TABLET SUBLINGUAL
Qty: 25 TABLET | Refills: 11 | Status: SHIPPED | OUTPATIENT
Start: 2024-12-16

## 2025-05-22 ENCOUNTER — OFFICE VISIT (OUTPATIENT)
Dept: CARDIOLOGY | Facility: CLINIC | Age: 55
End: 2025-05-22
Payer: MEDICAID

## 2025-05-22 VITALS
BODY MASS INDEX: 29.93 KG/M2 | WEIGHT: 221 LBS | SYSTOLIC BLOOD PRESSURE: 121 MMHG | HEIGHT: 72 IN | HEART RATE: 89 BPM | DIASTOLIC BLOOD PRESSURE: 76 MMHG

## 2025-05-22 DIAGNOSIS — I25.119 CORONARY ARTERY DISEASE INVOLVING NATIVE CORONARY ARTERY OF NATIVE HEART WITH ANGINA PECTORIS: ICD-10-CM

## 2025-05-22 DIAGNOSIS — R07.2 PRECORDIAL PAIN: Primary | ICD-10-CM

## 2025-05-22 DIAGNOSIS — R06.02 SHORTNESS OF BREATH: ICD-10-CM

## 2025-05-22 DIAGNOSIS — I50.32 CHRONIC DIASTOLIC CONGESTIVE HEART FAILURE: ICD-10-CM

## 2025-05-22 PROCEDURE — 3078F DIAST BP <80 MM HG: CPT | Performed by: PHYSICIAN ASSISTANT

## 2025-05-22 PROCEDURE — 99214 OFFICE O/P EST MOD 30 MIN: CPT | Performed by: PHYSICIAN ASSISTANT

## 2025-05-22 PROCEDURE — 3074F SYST BP LT 130 MM HG: CPT | Performed by: PHYSICIAN ASSISTANT

## 2025-05-22 NOTE — PROGRESS NOTES
Subjective   Ricardo Crespo is a 54 y.o. male     Chief Complaint   Patient presents with    Testing follow up     CAD   PROBLEM LIST:      1. Coronary artery disease  1.1. Stenting of the LAD, 11/2015, per Dr. Moss.  1.2  Stenting of the PDA, 03/2017, in the setting of low-level symptoms and abnormal stress test.  Previous stent to the LAD was patent.  The patient had nonobstructive disease otherwise.  Medical management was recommended.  1.3.  Stenting of the mid RCA and PTCA only of the distal LAD, 09/17.  1.4 Repeat left heart cath, March, 2018, with stenting of the LAD ×2.  Nonobstructive disease otherwise and patent stent with medical management recommended  1.5 Repeat heart catheterization, 4/2022 with eventual PTCA of the second diagonal of the LAD and stenting of the recurrent apical branch of the LAD.  His previous catheterization had demonstrated the segments of disease, but because of persistent chest pain he eventually had to be returned for intervention.  1.6 LHC per Dr. Gibson on 12-2-2022. Significant mid LAD in-stent re-stenoses, non-sign. Ostial 1st. Diagonal. Medical management recommended.  1.7 CABG, 3/2023, in [place].  The patient had single-vessel bypass with LIMA to LAD.  He had concurrent Maze procedure and atrial appendage ligation.  2. Preserved systolic function  3. Hypertension  4. Dyslipidemia  5. Diabetes mellitus  6. Sleep apnea, untreated. Not worn since weight loss  7. 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 Atrial Fibrillation, history of left atrial appendage ligation during bypass as above.       HPI    The patient presents in the clinic today for routine evaluation.  He carries extensive cardiovascular history as above.  He is concerned today because of chest pain.  He tells me as soon as we start talking that he is very concerned that he has a recurrent blockage.  He notes increasing chest pain.  This occurs with exertion and stress.  To this time, he has not had associated  neck, arm, or jaw discomfort.  He has associated dyspnea but no diaphoresis or nausea.  This is similar pain to what he has had angina in the past and he would like to pursue stress testing.  He has been very compliant with medical therapy for the most part.  He has no further complaints.  It is noted that he was taken off aspirin and statin therapy by his hepatology service.  We have asked that he consider going back on aspirin, if okay with that service.  He will check with him first.  We will avoid statin therapy altogether.  We have discussed injectable cholesterol therapy with him in the past and he would prefer to hold on anything of that nature.  He did lose significant weight and was able to discontinue some other medications.  He has no further complaints for now.    Current Outpatient Medications   Medication Sig Dispense Refill    furosemide (LASIX) 40 MG tablet TAKE 1 TABLET BY MOUTH TWO TIMES A DAY for fluid 180 tablet 1    lactulose (CHRONULAC) 10 GM/15ML solution solution (encephalopathy) Take 45 mL by mouth 2 (Two) Times a Day.      Mounjaro 15 MG/0.5ML solution pen-injector weekly      nitroglycerin (NITROSTAT) 0.4 MG SL tablet PLACE 1 TABLET UNDER THE TONGUE EVERY 5 MINUTES UP TO 3 TABLETS IN 15 MINUTES FOR CHEST PAIN. IF NO RELIEF GO TO THE EMERGENCY ROOM OR CALL 911 25 tablet 11    potassium chloride (K-DUR,KLOR-CON) 20 MEQ CR tablet 2 (Two) Times a Day.      sertraline (ZOLOFT) 100 MG tablet Take 2 tablets by mouth Daily.  2     No current facility-administered medications for this visit.       Beta adrenergic blockers, Calcium channel blockers, Imdur [isosorbide dinitrate], Ranexa [ranolazine], Statins, and Latex    Past Medical History:   Diagnosis Date    Abnormal ECG     Alcohol liver damage     Aneurysm     Anxiety     Arrhythmia     Atrial fibrillation     Benign hypertension     Bipolar depression     CHF (congestive heart failure)     Cirrhosis of liver     Clotting disorder      Congenital heart disease     Coronary artery disease     DVT (deep venous thrombosis)     Questionable history of deep venous thrombosis.    Dyslipidemia     Hyperlipidemia     Myocardial infarction     x 2    Obesity     JIM on CPAP     Obstructive sleep apnea, compliant with CPAP.     Pulmonary embolism     S/P left atrial appendage ligation 2023    Type 2 diabetes mellitus     Valvular disease        Social History     Socioeconomic History    Marital status:    Tobacco Use    Smoking status: Former     Current packs/day: 0.00     Average packs/day: 1 pack/day for 22.4 years (22.4 ttl pk-yrs)     Types: Cigarettes     Start date: 1985     Quit date: 2008     Years since quittin.4    Smokeless tobacco: Never    Tobacco comments:     Quit 12 years ago; smoked 17 years at 1 PPD   Vaping Use    Vaping status: Never Used   Substance and Sexual Activity    Alcohol use: Not Currently     Comment: Quit 12 years ago; drank a fifth a day for 20 years    Drug use: No    Sexual activity: Yes     Partners: Female     Birth control/protection: None       Family History   Problem Relation Age of Onset    Heart disease Father         Arterial disease    Nephrolithiasis Father     Heart attack Father     Heart failure Father     Atrial fibrillation Mother     Arrhythmia Mother     No Known Problems Sister        Review of Systems   Constitutional:  Positive for fatigue. Negative for activity change, appetite change, chills and fever.   HENT: Negative.  Negative for congestion, sinus pressure and sinus pain.    Eyes: Negative.  Negative for visual disturbance.   Respiratory: Negative.  Negative for apnea, cough, chest tightness, shortness of breath and wheezing.    Cardiovascular:  Positive for chest pain (ongoing for x5-6 years).   Gastrointestinal: Negative.  Negative for blood in stool.   Endocrine: Negative.  Negative for cold intolerance and heat intolerance.   Genitourinary: Negative.  Negative for  "hematuria.   Musculoskeletal: Negative.  Negative for gait problem.   Skin: Negative.  Negative for color change, rash and wound.   Allergic/Immunologic: Negative.  Negative for environmental allergies and food allergies.   Neurological:  Positive for dizziness (When BP drops low). Negative for syncope, weakness, light-headedness and headaches.   Hematological:  Bruises/bleeds easily.   Psychiatric/Behavioral: Negative.  Negative for sleep disturbance.        Objective     Vitals:    05/22/25 1123   BP: 121/76   BP Location: Right arm   Patient Position: Sitting   Cuff Size: Adult   Pulse: 89   Weight: 100 kg (221 lb)   Height: 182.9 cm (72\")        /76 (BP Location: Right arm, Patient Position: Sitting, Cuff Size: Adult)   Pulse 89   Ht 182.9 cm (72\")   Wt 100 kg (221 lb)   BMI 29.97 kg/m²      Lab Results (most recent)       None            Physical Exam  Vitals and nursing note reviewed.   Constitutional:       General: He is not in acute distress.     Appearance: He is well-developed.   HENT:      Head: Normocephalic and atraumatic.   Eyes:      Conjunctiva/sclera: Conjunctivae normal.      Pupils: Pupils are equal, round, and reactive to light.   Neck:      Vascular: No JVD.      Trachea: No tracheal deviation.   Cardiovascular:      Rate and Rhythm: Normal rate and regular rhythm.      Heart sounds: Normal heart sounds.   Pulmonary:      Effort: Pulmonary effort is normal.      Breath sounds: Normal breath sounds.   Abdominal:      General: Bowel sounds are normal. There is no distension.      Palpations: Abdomen is soft. There is no mass.      Tenderness: There is no abdominal tenderness. There is no guarding or rebound.   Musculoskeletal:         General: No tenderness or deformity. Normal range of motion.      Cervical back: Normal range of motion and neck supple.   Skin:     General: Skin is warm and dry.      Coloration: Skin is not pale.      Findings: No erythema or rash.   Neurological:      " Mental Status: He is alert and oriented to person, place, and time.   Psychiatric:         Behavior: Behavior normal.         Thought Content: Thought content normal.         Judgment: Judgment normal.         Procedure   Procedures         Assessment & Plan      Diagnosis Plan   1. Precordial pain  Stress Test With Myocardial Perfusion One Day      2. Coronary artery disease involving native coronary artery of native heart with angina pectoris        3. Chronic diastolic congestive heart failure        4. Shortness of breath          1.  The patient presents in the clinic today for follow-up at his request.  He is concerned with increasing chest pain which mimics prior angina.  He would like evaluation.  He will be scheduled for nuclear stress test.    2.  He was scheduled for echo and carotid duplex after last appointment.  This was performed late last fall.  Echo indicated normal systolic function, mild LVH, grade 2 diastolic dysfunction, mild to moderate left atrial and mild right atrial enlargement, no significant valvular nor structural abnormalities.  Aortic root was measured at 4 cm.  Carotid duplex suggested nonobstructive disease bilaterally.    3.  Again for now, we are very limited in how we can adjust medications.  See above for further description.    4.  As soon as we know results of the above we can see him back.  For further issues he will proceed onto the emergency room.           Ricardo BENJA Crespo  reports that he quit smoking about 17 years ago. His smoking use included cigarettes. He started smoking about 39 years ago. He has a 22.4 pack-year smoking history. He has never used smokeless tobacco.    Patient did not bring med list or medicine bottles to appointment, med list has been reviewed and updated based on patient's knowledge of their meds.        Electronically signed by:

## 2025-07-08 ENCOUNTER — HOSPITAL ENCOUNTER (OUTPATIENT)
Dept: CARDIOLOGY | Facility: HOSPITAL | Age: 55
Discharge: HOME | End: 2025-07-08
Payer: MEDICAID

## 2025-07-08 DIAGNOSIS — R07.2 PRECORDIAL PAIN: ICD-10-CM

## 2025-07-08 LAB
BH CV REST NUCLEAR ISOTOPE DOSE: 10 MCI
BH CV STRESS COMMENTS STAGE 1: NORMAL
BH CV STRESS DOSE REGADENOSON STAGE 1: 0.4
BH CV STRESS DURATION MIN STAGE 1: 0
BH CV STRESS DURATION SEC STAGE 1: 10
BH CV STRESS NUCLEAR ISOTOPE DOSE: 30 MCI
BH CV STRESS PROTOCOL 1: NORMAL
BH CV STRESS RECOVERY BP: NORMAL MMHG
BH CV STRESS RECOVERY HR: 83 BPM
BH CV STRESS STAGE 1: 1
MAXIMAL PREDICTED HEART RATE: 164 BPM
PERCENT MAX PREDICTED HR: 53.05 %
SPECT HRT GATED+EF W RNC IV: 49 %
STRESS BASELINE BP: NORMAL MMHG
STRESS BASELINE HR: 79 BPM
STRESS PERCENT HR: 62 %
STRESS POST ESTIMATED WORKLOAD: 1 METS
STRESS POST EXERCISE DUR MIN: 2 MIN
STRESS POST PEAK BP: NORMAL MMHG
STRESS POST PEAK HR: 87 BPM
STRESS TARGET HR: 139 BPM

## 2025-07-08 PROCEDURE — 93017 CV STRESS TEST TRACING ONLY: CPT

## 2025-07-08 PROCEDURE — 25010000002 REGADENOSON 0.4 MG/5ML SOLUTION: Performed by: INTERNAL MEDICINE

## 2025-07-08 PROCEDURE — 78452 HT MUSCLE IMAGE SPECT MULT: CPT

## 2025-07-08 PROCEDURE — A9500 TC99M SESTAMIBI: HCPCS | Performed by: INTERNAL MEDICINE

## 2025-07-08 PROCEDURE — 25010000002 AMINOPHYLLINE PER 250 MG: Performed by: INTERNAL MEDICINE

## 2025-07-08 PROCEDURE — 93018 CV STRESS TEST I&R ONLY: CPT | Performed by: SPECIALIST

## 2025-07-08 PROCEDURE — 78452 HT MUSCLE IMAGE SPECT MULT: CPT | Performed by: SPECIALIST

## 2025-07-08 PROCEDURE — 34310000005 TECHNETIUM SESTAMIBI: Performed by: INTERNAL MEDICINE

## 2025-07-08 RX ORDER — AMINOPHYLLINE 25 MG/ML
125 INJECTION, SOLUTION INTRAVENOUS
Status: COMPLETED | OUTPATIENT
Start: 2025-07-08 | End: 2025-07-08

## 2025-07-08 RX ORDER — REGADENOSON 0.08 MG/ML
0.4 INJECTION, SOLUTION INTRAVENOUS
Status: COMPLETED | OUTPATIENT
Start: 2025-07-08 | End: 2025-07-08

## 2025-07-08 RX ADMIN — AMINOPHYLLINE 250 MG: 25 INJECTION, SOLUTION INTRAVENOUS at 11:10

## 2025-07-08 RX ADMIN — TECHNETIUM TC 99M SESTAMIBI 1 DOSE: 1 INJECTION INTRAVENOUS at 09:07

## 2025-07-08 RX ADMIN — TECHNETIUM TC 99M SESTAMIBI 1 DOSE: 1 INJECTION INTRAVENOUS at 11:09

## 2025-07-08 RX ADMIN — REGADENOSON 0.4 MG: 0.08 INJECTION, SOLUTION INTRAVENOUS at 11:09

## 2025-07-09 ENCOUNTER — TELEPHONE (OUTPATIENT)
Dept: CARDIOLOGY | Facility: CLINIC | Age: 55
End: 2025-07-09
Payer: MEDICAID

## 2025-07-09 NOTE — TELEPHONE ENCOUNTER
Patient called asking on stress test results - he said he was returning a call to us    Per chart review I can not see who called him.     Attempted to call back but it is going to VM

## 2025-07-09 NOTE — TELEPHONE ENCOUNTER
Patient called back and left  with work number to be called if before 5PM due to no cell service     [phone]

## 2025-07-09 NOTE — TELEPHONE ENCOUNTER
Per Grzegorz LIM no significant ischemia seen, keep appointment on 7/29/25.      Called patient no answer, unable to leave voice message.

## 2025-07-29 ENCOUNTER — OFFICE VISIT (OUTPATIENT)
Dept: CARDIOLOGY | Facility: CLINIC | Age: 55
End: 2025-07-29
Payer: MEDICAID

## 2025-07-29 VITALS
WEIGHT: 219.4 LBS | HEIGHT: 72 IN | SYSTOLIC BLOOD PRESSURE: 105 MMHG | OXYGEN SATURATION: 97 % | BODY MASS INDEX: 29.72 KG/M2 | DIASTOLIC BLOOD PRESSURE: 67 MMHG | HEART RATE: 80 BPM

## 2025-07-29 DIAGNOSIS — R06.02 SHORTNESS OF BREATH: ICD-10-CM

## 2025-07-29 DIAGNOSIS — I50.32 CHRONIC DIASTOLIC CONGESTIVE HEART FAILURE: ICD-10-CM

## 2025-07-29 DIAGNOSIS — R07.2 PRECORDIAL PAIN: ICD-10-CM

## 2025-07-29 DIAGNOSIS — I25.119 CORONARY ARTERY DISEASE INVOLVING NATIVE CORONARY ARTERY OF NATIVE HEART WITH ANGINA PECTORIS: Primary | ICD-10-CM

## 2025-07-29 PROCEDURE — 1160F RVW MEDS BY RX/DR IN RCRD: CPT | Performed by: PHYSICIAN ASSISTANT

## 2025-07-29 PROCEDURE — 3078F DIAST BP <80 MM HG: CPT | Performed by: PHYSICIAN ASSISTANT

## 2025-07-29 PROCEDURE — 1159F MED LIST DOCD IN RCRD: CPT | Performed by: PHYSICIAN ASSISTANT

## 2025-07-29 PROCEDURE — 99214 OFFICE O/P EST MOD 30 MIN: CPT | Performed by: PHYSICIAN ASSISTANT

## 2025-07-29 PROCEDURE — 3074F SYST BP LT 130 MM HG: CPT | Performed by: PHYSICIAN ASSISTANT

## 2025-07-29 RX ORDER — NITROGLYCERIN 0.4 MG/1
0.4 TABLET SUBLINGUAL
Qty: 100 TABLET | Refills: 5 | Status: SHIPPED | OUTPATIENT
Start: 2025-07-29

## 2025-07-29 NOTE — PROGRESS NOTES
Problem list     Subjective   Ricardo Crespo is a 54 y.o. male     Chief Complaint   Patient presents with    Coronary Artery Disease     Stress follow up    Chest Pain     Released from [facility] yesterday after 5 day stay for syncope (hypoglycemia), echo done. Patient refused cath.      Atrial Fibrillation     PROBLEM LIST:      1. Coronary artery disease  1.1. Stenting of the LAD, 11/2015, per Dr. Moss.  1.2  Stenting of the PDA, 03/2017, in the setting of low-level symptoms and abnormal stress test.  Previous stent to the LAD was patent.  The patient had nonobstructive disease otherwise.  Medical management was recommended.  1.3.  Stenting of the mid RCA and PTCA only of the distal LAD, 09/17.  1.4 Repeat left heart cath, March, 2018, with stenting of the LAD ×2.  Nonobstructive disease otherwise and patent stent with medical management recommended  1.5 Repeat heart catheterization, 4/2022 with eventual PTCA of the second diagonal of the LAD and stenting of the recurrent apical branch of the LAD.  His previous catheterization had demonstrated the segments of disease, but because of persistent chest pain he eventually had to be returned for intervention.  1.6 LHC per Dr. Gibson on 12-2-2022. Significant mid LAD in-stent re-stenoses, non-sign. Ostial 1st. Diagonal. Medical management recommended.  1.7 CABG, 3/2023, in [place].  The patient had single-vessel bypass with LIMA to LAD.  He had concurrent Maze procedure and atrial appendage ligation.  2. Preserved systolic function  3. Hypertension  4. Dyslipidemia  5. Diabetes mellitus  6. Sleep apnea, untreated. Not worn since weight loss  7. 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 Atrial Fibrillation, history of left atrial appendage ligation during bypass as above.     HPI  The patient presents in the clinic today for follow-up.  He was seen recently with concern of symptoms.  Stress test was performed.  He had recurrent chest discomfort throughout the stress  test.  There was no significant ischemia noted by that stress test performed just a few days ago.  Diaphragmatic attenuation artifact was noted, post stress EF 49%.  Because he developed what he described as crescendo chest pain not responsive to sublingual nitro as it typically had been, he was referred on to the emergency room locally.  Apparently he was placed in a room and he tells me that he never had evaluation.  He eventually left on his own accord.  He has since been evaluated through the emergency room and had admission through [facility].  Apparently he had presyncope and even syncope.  His altered level of consciousness eventually was felt secondary to hypoglycemia, all by his report.  He has chronic chest pain which is worsened recently.  He does not feel this is severe enough for cath yet.  He feels that this likely will be needed soon.  He wants to hold on that.  He will go back to the ER if he has any problems.  He has no failure issues.  He has no dysrhythmic symptoms.  Dyspnea is at baseline.  He has no further complaints.    Current Outpatient Medications on File Prior to Visit   Medication Sig Dispense Refill    furosemide (LASIX) 40 MG tablet TAKE 1 TABLET BY MOUTH TWO TIMES A DAY for fluid 180 tablet 1    lactulose (CHRONULAC) 10 GM/15ML solution solution (encephalopathy) Take 45 mL by mouth 2 (Two) Times a Day.      Mounjaro 15 MG/0.5ML solution pen-injector weekly      potassium chloride (K-DUR,KLOR-CON) 20 MEQ CR tablet 2 (Two) Times a Day.      sertraline (ZOLOFT) 100 MG tablet Take 2 tablets by mouth Daily.  2    [DISCONTINUED] nitroglycerin (NITROSTAT) 0.4 MG SL tablet PLACE 1 TABLET UNDER THE TONGUE EVERY 5 MINUTES UP TO 3 TABLETS IN 15 MINUTES FOR CHEST PAIN. IF NO RELIEF GO TO THE EMERGENCY ROOM OR CALL 911 25 tablet 11     No current facility-administered medications on file prior to visit.       Beta adrenergic blockers, Calcium channel blockers, Imdur [isosorbide dinitrate], Latex,  Ranexa [ranolazine], and Statins    Past Medical History:   Diagnosis Date    Abnormal ECG     Alcohol liver damage     Aneurysm     Anxiety     Arrhythmia     Atrial fibrillation     Benign hypertension     Bipolar depression     CHF (congestive heart failure)     Cirrhosis of liver     Clotting disorder     Congenital heart disease     Coronary artery disease     DVT (deep venous thrombosis)     Questionable history of deep venous thrombosis.    Dyslipidemia     Hyperlipidemia     Myocardial infarction     x 2    Obesity     JIM on CPAP     Obstructive sleep apnea, compliant with CPAP.     Pulmonary embolism     S/P left atrial appendage ligation 2023    Type 2 diabetes mellitus     Valvular disease        Social History     Socioeconomic History    Marital status:    Tobacco Use    Smoking status: Former     Current packs/day: 0.00     Average packs/day: 1 pack/day for 22.4 years (22.4 ttl pk-yrs)     Types: Cigarettes     Start date: 1985     Quit date: 2008     Years since quittin.5    Smokeless tobacco: Never    Tobacco comments:     Quit 12 years ago; smoked 17 years at 1 PPD   Vaping Use    Vaping status: Never Used   Substance and Sexual Activity    Alcohol use: Not Currently     Comment: Quit 12 years ago; drank a fifth a day for 20 years    Drug use: No    Sexual activity: Yes     Partners: Female     Birth control/protection: None       Family History   Problem Relation Age of Onset    Heart disease Father         Arterial disease    Nephrolithiasis Father     Heart attack Father     Heart failure Father     Atrial fibrillation Mother     Arrhythmia Mother     No Known Problems Sister        Review of Systems   Constitutional:  Positive for fatigue. Negative for chills, diaphoresis and fever.   HENT: Negative.     Eyes: Negative.    Respiratory:  Positive for shortness of breath (with daily activity and times at rest). Negative for apnea, cough, chest tightness and wheezing.   "  Cardiovascular:  Positive for chest pain (daily), palpitations (afib) and leg swelling (R more than L).   Gastrointestinal: Negative.  Negative for abdominal pain, constipation, diarrhea, nausea and vomiting.   Endocrine: Negative.    Genitourinary: Negative.  Negative for hematuria.   Musculoskeletal:  Positive for back pain. Negative for arthralgias, myalgias and neck pain.   Skin: Negative.    Allergic/Immunologic: Negative.    Neurological:  Positive for dizziness (hypoglycemia), syncope (hypoglycemia, released from [facility] yesterday) and weakness (L side). Negative for light-headedness, numbness and headaches.   Hematological:  Bruises/bleeds easily.   Psychiatric/Behavioral: Negative.  Negative for sleep disturbance (about 2 hours a night).        Objective   Vitals:    07/29/25 1424   BP: 105/67   BP Location: Left arm   Patient Position: Sitting   Pulse: 80   SpO2: 97%   Weight: 99.5 kg (219 lb 6.4 oz)   Height: 182.9 cm (72\")      /67 (BP Location: Left arm, Patient Position: Sitting)   Pulse 80   Ht 182.9 cm (72\")   Wt 99.5 kg (219 lb 6.4 oz)   SpO2 97%   BMI 29.76 kg/m²    Lab Results (most recent)       None          Physical Exam  Vitals and nursing note reviewed.   Constitutional:       General: He is not in acute distress.     Appearance: He is well-developed.   HENT:      Head: Normocephalic and atraumatic.   Eyes:      Conjunctiva/sclera: Conjunctivae normal.      Pupils: Pupils are equal, round, and reactive to light.   Neck:      Vascular: No JVD.      Trachea: No tracheal deviation.   Cardiovascular:      Rate and Rhythm: Normal rate and regular rhythm.      Heart sounds: Normal heart sounds.   Pulmonary:      Effort: Pulmonary effort is normal.      Breath sounds: Normal breath sounds.   Abdominal:      General: Bowel sounds are normal. There is no distension.      Palpations: Abdomen is soft. There is no mass.      Tenderness: There is no abdominal tenderness. There is no " guarding or rebound.   Musculoskeletal:         General: No tenderness or deformity. Normal range of motion.      Cervical back: Normal range of motion and neck supple.   Skin:     General: Skin is warm and dry.      Coloration: Skin is not pale.      Findings: No erythema or rash.   Neurological:      Mental Status: He is alert and oriented to person, place, and time.   Psychiatric:         Behavior: Behavior normal.         Thought Content: Thought content normal.         Judgment: Judgment normal.           Procedure   Procedures       Assessment & Plan      Diagnosis Plan   1. Coronary artery disease involving native coronary artery of native heart with angina pectoris        2. Precordial pain  nitroglycerin (NITROSTAT) 0.4 MG SL tablet      3. Chronic diastolic congestive heart failure        4. Shortness of breath        1.  Again stress test suggest diaphragmatic attenuation but no ischemia.  That was performed just a few days ago.  He has ongoing chest pain which is chronic.  He has been intolerant to virtually all medications used for antianginal prophylaxis.  He could not tolerate long-acting nitrate because of hypotension and headache.  He tells me that he did great on Ranexa but he started having intensified liver issues and increasing liver enzymes.  He tells me his hepatology team advised him to discontinue that medication specifically.  Blood pressures will tolerate traditional antianginal therapy such as beta-blocker and calcium channel blocker therapy.    2.  Again, he feels well enough that he wants to continue treating with sublingual nitro as needed.  He cannot tolerate statin therapy because of liver status/cirrhosis.  He does not want to challenge injectables.  I really feel we are limited on further modification of medications.  For issues he will going to the emergency room.  Will see him back in a month for symptom check.    3.  He will report back here or again to the emergency room for  any issues.             Ricardo Crespo  reports that he quit smoking about 17 years ago. His smoking use included cigarettes. He started smoking about 40 years ago. He has a 22.4 pack-year smoking history. He has never used smokeless tobacco.       Electronically signed by:

## 2025-08-26 ENCOUNTER — CLINICAL SUPPORT (OUTPATIENT)
Dept: CARDIOLOGY | Facility: CLINIC | Age: 55
End: 2025-08-26
Payer: MEDICAID

## 2025-08-26 VITALS
HEIGHT: 72 IN | SYSTOLIC BLOOD PRESSURE: 104 MMHG | BODY MASS INDEX: 29.39 KG/M2 | WEIGHT: 217 LBS | HEART RATE: 80 BPM | DIASTOLIC BLOOD PRESSURE: 71 MMHG | OXYGEN SATURATION: 96 %

## 2025-08-26 DIAGNOSIS — R42 DIZZINESS: ICD-10-CM

## 2025-08-26 DIAGNOSIS — R00.2 PALPITATION: Primary | ICD-10-CM

## (undated) DEVICE — GLIDESHEATH SLENDER ACCESS KIT: Brand: GLIDESHEATH SLENDER

## (undated) DEVICE — CATH DIAG EXPO .045 FL3  5F 100CM

## (undated) DEVICE — RUNWAY RADL W/TOP PAD

## (undated) DEVICE — CANNULA,OXY,ADULT,SUPER SOFT,W/14'TUB,UC: Brand: MEDLINE INDUSTRIES, INC.

## (undated) DEVICE — GW INQWIRE FC PTFE STD J/1.5 .035 260

## (undated) DEVICE — MODEL AT P65, P/N 701554-001KIT CONTENTS: HAND CONTROLLER, 3-WAY HIGH-PRESSURE STOPCOCK WITH ROTATING END AND PREMIUM HIGH-PRESSURE TUBING: Brand: ANGIOTOUCH® KIT

## (undated) DEVICE — CATH F5 INF JR 4 100CM: Brand: INFINITI

## (undated) DEVICE — PK CATH CARD 70

## (undated) DEVICE — MODEL BT2000 P/N 700287-012KIT CONTENTS: MANIFOLD WITH SALINE AND CONTRAST PORTS, SALINE TUBING WITH SPIKE AND HAND SYRINGE, TRANSDUCER: Brand: BT2000 AUTOMATED MANIFOLD KIT

## (undated) DEVICE — A2000 MULTI-USE SYRINGE KIT, P/N 701277-003KIT CONTENTS: 100ML CONTRAST RESERVOIR AND TUBING WITH CONTRAST SPIKE AND CLAMP: Brand: A2000 MULTI-USE SYRINGE KIT

## (undated) DEVICE — PAD, DEFIB, ADULT, RADIOTRANS, ZOLL: Brand: MEDLINE

## (undated) DEVICE — DRAPE, RADIAL, STERILE: Brand: MEDLINE

## (undated) DEVICE — CATH DIAG EXPO M/ PK 5F FL4/FR4 PIG

## (undated) DEVICE — GW INQW FIX/CORE PTFE J/3MM .035 260CM

## (undated) DEVICE — ST INF PRI SMRTSTE 20DRP 2VLV 24ML 117

## (undated) DEVICE — ADULT DISPOSABLE SINGLE-PATIENT USE PULSE OXIMETER SENSOR: Brand: NONIN

## (undated) DEVICE — SKIN PREP TRAY W/CHG: Brand: MEDLINE INDUSTRIES, INC.

## (undated) DEVICE — TR BAND RADIAL ARTERY COMPRESSION DEVICE: Brand: TR BAND

## (undated) DEVICE — RADIFOCUS OPTITORQUE ANGIOGRAPHIC CATHETER: Brand: OPTITORQUE

## (undated) DEVICE — DEV COMP RAD PRELUDESYNC 29CM

## (undated) DEVICE — INTRO SHEATH PRELUDE IDEAL SPRNG COIL 021 6F 23X80CM

## (undated) DEVICE — PK CATH CARD 10

## (undated) DEVICE — ST EXT IV SMARTSITE 2VLV SP M LL 5ML IV1

## (undated) DEVICE — DRSNG SURESITE WNDW 4X4.5